# Patient Record
Sex: FEMALE | Race: WHITE | Employment: PART TIME | URBAN - METROPOLITAN AREA
[De-identification: names, ages, dates, MRNs, and addresses within clinical notes are randomized per-mention and may not be internally consistent; named-entity substitution may affect disease eponyms.]

---

## 2017-06-23 ENCOUNTER — TELEPHONE (OUTPATIENT)
Dept: CARDIOLOGY CLINIC | Age: 52
End: 2017-06-23

## 2017-06-24 RX ORDER — METOPROLOL TARTRATE 100 MG/1
100 TABLET ORAL 2 TIMES DAILY
Qty: 60 TABLET | Refills: 0 | Status: ON HOLD | OUTPATIENT
Start: 2017-06-24 | End: 2017-07-03 | Stop reason: HOSPADM

## 2017-07-10 ENCOUNTER — OFFICE VISIT (OUTPATIENT)
Dept: CARDIOLOGY CLINIC | Age: 52
End: 2017-07-10

## 2017-07-10 VITALS
BODY MASS INDEX: 48.82 KG/M2 | HEIGHT: 65 IN | SYSTOLIC BLOOD PRESSURE: 162 MMHG | HEART RATE: 64 BPM | WEIGHT: 293 LBS | DIASTOLIC BLOOD PRESSURE: 90 MMHG

## 2017-07-10 DIAGNOSIS — E66.09 NON MORBID OBESITY DUE TO EXCESS CALORIES: ICD-10-CM

## 2017-07-10 DIAGNOSIS — E88.81 METABOLIC SYNDROME: ICD-10-CM

## 2017-07-10 DIAGNOSIS — E78.2 MIXED HYPERLIPIDEMIA: ICD-10-CM

## 2017-07-10 DIAGNOSIS — I48.0 PAROXYSMAL ATRIAL FIBRILLATION (HCC): ICD-10-CM

## 2017-07-10 DIAGNOSIS — I87.2 VENOUS STASIS DERMATITIS OF BOTH LOWER EXTREMITIES: ICD-10-CM

## 2017-07-10 DIAGNOSIS — I10 ESSENTIAL HYPERTENSION: Primary | ICD-10-CM

## 2017-07-10 DIAGNOSIS — I48.91 ATRIAL FIBRILLATION WITH RVR (HCC): ICD-10-CM

## 2017-07-10 PROCEDURE — 99213 OFFICE O/P EST LOW 20 MIN: CPT | Performed by: INTERNAL MEDICINE

## 2017-07-10 PROCEDURE — 93000 ELECTROCARDIOGRAM COMPLETE: CPT | Performed by: INTERNAL MEDICINE

## 2017-07-10 RX ORDER — LISINOPRIL 10 MG/1
10 TABLET ORAL DAILY
Qty: 30 TABLET | Refills: 5 | Status: SHIPPED | OUTPATIENT
Start: 2017-07-10 | End: 2017-10-10 | Stop reason: SINTOL

## 2017-07-10 RX ORDER — PROPAFENONE HYDROCHLORIDE 150 MG/1
225 TABLET, FILM COATED ORAL EVERY 8 HOURS SCHEDULED
Qty: 90 TABLET | Refills: 5 | Status: SHIPPED | OUTPATIENT
Start: 2017-07-10 | End: 2017-07-25 | Stop reason: SDUPTHER

## 2017-07-10 RX ORDER — FENOFIBRATE 145 MG/1
145 TABLET, COATED ORAL DAILY
Qty: 30 TABLET | Refills: 3 | Status: SHIPPED | OUTPATIENT
Start: 2017-07-10 | End: 2017-10-22 | Stop reason: SDUPTHER

## 2017-07-14 ENCOUNTER — OFFICE VISIT (OUTPATIENT)
Dept: CARDIOLOGY CLINIC | Age: 52
End: 2017-07-14

## 2017-07-14 DIAGNOSIS — I48.91 ATRIAL FIBRILLATION WITH RVR (HCC): Primary | ICD-10-CM

## 2017-07-14 PROCEDURE — 99999 PR OFFICE/OUTPT VISIT,PROCEDURE ONLY: CPT | Performed by: INTERNAL MEDICINE

## 2017-07-17 LAB
BUN / CREAT RATIO: 19 (CALC) (ref 7–25)
BUN BLDV-MCNC: 19 MG/DL (ref 3–29)
CALCIUM SERPL-MCNC: 9.5 MG/DL (ref 8.5–10.5)
CHLORIDE BLD-SCNC: 101 MEQ/L (ref 96–110)
CO2: 29 MEQ/L (ref 19–32)
COPY(IES) SENT TO:: NORMAL
CREAT SERPL-MCNC: 1 MG/DL
GFR SERPL CREATININE-BSD FRML MDRD: 64 ML/MIN/1.73M2
GLUCOSE BLD-MCNC: 101 MG/DL
POTASSIUM SERPL-SCNC: 4.5 MEQ/L (ref 3.4–5.3)
SODIUM BLD-SCNC: 141 MEQ/L (ref 135–148)

## 2017-07-24 ENCOUNTER — TELEPHONE (OUTPATIENT)
Dept: CARDIOLOGY CLINIC | Age: 52
End: 2017-07-24

## 2017-07-25 RX ORDER — PROPAFENONE HYDROCHLORIDE 150 MG/1
225 TABLET, FILM COATED ORAL EVERY 8 HOURS SCHEDULED
Qty: 135 TABLET | Refills: 5 | Status: SHIPPED | OUTPATIENT
Start: 2017-07-25 | End: 2018-02-23 | Stop reason: SDUPTHER

## 2017-08-04 LAB
CHOLESTEROL, TOTAL: 173 MG/DL
COPY(IES) SENT TO:: NORMAL
GDT REPLACEMENT: NORMAL
HDLC SERPL-MCNC: 41 MG/DL
LDL CHOLESTEROL: 104 MG/DL (CALC)
TRIGL SERPL-MCNC: 138 MG/DL
VLDLC SERPL CALC-MCNC: 28 MG/DL (CALC) (ref 4–38)

## 2017-08-10 ENCOUNTER — NURSE ONLY (OUTPATIENT)
Dept: CARDIOLOGY CLINIC | Age: 52
End: 2017-08-10

## 2017-08-10 ENCOUNTER — OFFICE VISIT (OUTPATIENT)
Dept: CARDIOLOGY CLINIC | Age: 52
End: 2017-08-10

## 2017-08-10 VITALS
DIASTOLIC BLOOD PRESSURE: 90 MMHG | HEART RATE: 61 BPM | SYSTOLIC BLOOD PRESSURE: 136 MMHG | HEIGHT: 65 IN | WEIGHT: 293 LBS | BODY MASS INDEX: 48.82 KG/M2

## 2017-08-10 DIAGNOSIS — I10 ESSENTIAL HYPERTENSION: Primary | ICD-10-CM

## 2017-08-10 DIAGNOSIS — I10 ESSENTIAL HYPERTENSION: ICD-10-CM

## 2017-08-10 DIAGNOSIS — E88.81 METABOLIC SYNDROME: ICD-10-CM

## 2017-08-10 DIAGNOSIS — E66.09 NON MORBID OBESITY DUE TO EXCESS CALORIES: ICD-10-CM

## 2017-08-10 DIAGNOSIS — I87.2 VENOUS STASIS DERMATITIS OF BOTH LOWER EXTREMITIES: ICD-10-CM

## 2017-08-10 DIAGNOSIS — I48.0 PAROXYSMAL ATRIAL FIBRILLATION (HCC): ICD-10-CM

## 2017-08-10 DIAGNOSIS — E78.2 MIXED HYPERLIPIDEMIA: ICD-10-CM

## 2017-08-10 DIAGNOSIS — R00.2 PALPITATIONS: Primary | ICD-10-CM

## 2017-08-10 PROCEDURE — 99213 OFFICE O/P EST LOW 20 MIN: CPT | Performed by: INTERNAL MEDICINE

## 2017-08-10 PROCEDURE — 93000 ELECTROCARDIOGRAM COMPLETE: CPT | Performed by: INTERNAL MEDICINE

## 2017-08-10 RX ORDER — PROPAFENONE HYDROCHLORIDE 150 MG/1
225 TABLET, FILM COATED ORAL EVERY 8 HOURS SCHEDULED
Qty: 135 TABLET | Refills: 5 | Status: CANCELLED | OUTPATIENT
Start: 2017-08-10

## 2017-08-16 ENCOUNTER — INITIAL CONSULT (OUTPATIENT)
Dept: CARDIOLOGY CLINIC | Age: 52
End: 2017-08-16

## 2017-08-16 VITALS
BODY MASS INDEX: 48.82 KG/M2 | WEIGHT: 293 LBS | HEART RATE: 55 BPM | SYSTOLIC BLOOD PRESSURE: 164 MMHG | HEIGHT: 65 IN | DIASTOLIC BLOOD PRESSURE: 90 MMHG | OXYGEN SATURATION: 99 %

## 2017-08-16 DIAGNOSIS — G47.30 SLEEP APNEA, UNSPECIFIED TYPE: ICD-10-CM

## 2017-08-16 DIAGNOSIS — I48.0 PAROXYSMAL ATRIAL FIBRILLATION (HCC): ICD-10-CM

## 2017-08-16 DIAGNOSIS — I10 ESSENTIAL HYPERTENSION: ICD-10-CM

## 2017-08-16 DIAGNOSIS — E66.09 NON MORBID OBESITY DUE TO EXCESS CALORIES: ICD-10-CM

## 2017-08-16 DIAGNOSIS — I87.2 VENOUS STASIS DERMATITIS OF BOTH LOWER EXTREMITIES: ICD-10-CM

## 2017-08-16 DIAGNOSIS — I48.91 ATRIAL FIBRILLATION WITH RVR (HCC): ICD-10-CM

## 2017-08-16 DIAGNOSIS — E88.81 METABOLIC SYNDROME: ICD-10-CM

## 2017-08-16 DIAGNOSIS — I48.0 PAF (PAROXYSMAL ATRIAL FIBRILLATION) (HCC): Primary | ICD-10-CM

## 2017-08-16 DIAGNOSIS — E78.2 MIXED HYPERLIPIDEMIA: ICD-10-CM

## 2017-08-16 PROCEDURE — 93000 ELECTROCARDIOGRAM COMPLETE: CPT | Performed by: INTERNAL MEDICINE

## 2017-08-16 PROCEDURE — 99205 OFFICE O/P NEW HI 60 MIN: CPT | Performed by: INTERNAL MEDICINE

## 2017-08-22 PROCEDURE — 93228 REMOTE 30 DAY ECG REV/REPORT: CPT | Performed by: INTERNAL MEDICINE

## 2017-08-27 ASSESSMENT — ENCOUNTER SYMPTOMS
WHEEZING: 0
SHORTNESS OF BREATH: 1
ABDOMINAL PAIN: 0
COLOR CHANGE: 0
PHOTOPHOBIA: 0
CHEST TIGHTNESS: 0
NAUSEA: 0
BACK PAIN: 0
EYE PAIN: 0
CONSTIPATION: 0
VOMITING: 0
COUGH: 0
DIARRHEA: 0
BLOOD IN STOOL: 0

## 2017-09-18 ENCOUNTER — TELEPHONE (OUTPATIENT)
Dept: CARDIOLOGY CLINIC | Age: 52
End: 2017-09-18

## 2017-09-20 ENCOUNTER — HOSPITAL ENCOUNTER (OUTPATIENT)
Dept: SLEEP CENTER | Age: 52
Discharge: HOME | End: 2017-09-20
Attending: INTERNAL MEDICINE | Admitting: INTERNAL MEDICINE

## 2017-09-20 VITALS
HEIGHT: 64 IN | SYSTOLIC BLOOD PRESSURE: 155 MMHG | DIASTOLIC BLOOD PRESSURE: 71 MMHG | HEART RATE: 57 BPM | BODY MASS INDEX: 50.02 KG/M2 | WEIGHT: 293 LBS

## 2017-09-20 DIAGNOSIS — G47.33 OBSTRUCTIVE SLEEP APNEA (ADULT) (PEDIATRIC): Primary | ICD-10-CM

## 2017-09-20 ASSESSMENT — SLEEP AND FATIGUE QUESTIONNAIRES
HOW LIKELY ARE YOU TO NOD OFF OR FALL ASLEEP WHEN YOU ARE A PASSENGER IN A CAR FOR AN HOUR WITHOUT A BREAK: 1
HOW LIKELY ARE YOU TO NOD OFF OR FALL ASLEEP WHILE SITTING INACTIVE IN A PUBLIC PLACE: 0
HOW LIKELY ARE YOU TO NOD OFF OR FALL ASLEEP IN A CAR, WHILE STOPPED FOR A FEW MINUTES IN TRAFFIC: 0
HOW LIKELY ARE YOU TO NOD OFF OR FALL ASLEEP WHILE SITTING AND READING: 0
HOW LIKELY ARE YOU TO NOD OFF OR FALL ASLEEP WHILE SITTING AND TALKING TO SOMEONE: 0
NECK CIRCUMFERENCE (INCHES): 19.75
HOW LIKELY ARE YOU TO NOD OFF OR FALL ASLEEP WHILE WATCHING TV: 2
HOW LIKELY ARE YOU TO NOD OFF OR FALL ASLEEP WHILE LYING DOWN TO REST IN THE AFTERNOON WHEN CIRCUMSTANCES PERMIT: 1
HOW LIKELY ARE YOU TO NOD OFF OR FALL ASLEEP WHILE SITTING QUIETLY AFTER LUNCH WITHOUT ALCOHOL: 0
ESS TOTAL SCORE: 4

## 2017-10-05 ENCOUNTER — HOSPITAL ENCOUNTER (OUTPATIENT)
Dept: SLEEP CENTER | Age: 52
Discharge: HOME | End: 2017-10-05
Attending: PSYCHIATRY & NEUROLOGY | Admitting: PSYCHIATRY & NEUROLOGY

## 2017-10-05 VITALS — BODY MASS INDEX: 50.02 KG/M2 | WEIGHT: 293 LBS | HEIGHT: 64 IN

## 2017-10-05 DIAGNOSIS — G47.33 OBSTRUCTIVE SLEEP APNEA (ADULT) (PEDIATRIC): ICD-10-CM

## 2017-10-09 ENCOUNTER — TELEPHONE (OUTPATIENT)
Dept: CARDIOLOGY CLINIC | Age: 52
End: 2017-10-09

## 2017-10-09 NOTE — TELEPHONE ENCOUNTER
Pt notified that Ronaldo Livonia will talk to Dr. Holloway Proud about this when he is here tomorrow afternoon and see what he wants to change her to.

## 2017-10-09 NOTE — TELEPHONE ENCOUNTER
Pt has been on Lisinopril since the beginning of July. About 3-4 weeks ago pt states she started having a dry, hacking cough with no other symptoms. Pt states she thinks it is the medication causing this and wants to know what to do.

## 2017-10-10 NOTE — TELEPHONE ENCOUNTER
Per Dr. Daigle Outlaw patient to DC Lisinopril and start Cozaar 25 mg daily.   Med e-scribed to [facility]

## 2017-10-11 RX ORDER — LOSARTAN POTASSIUM 25 MG/1
25 TABLET ORAL DAILY
Qty: 30 TABLET | Refills: 6 | Status: SHIPPED | OUTPATIENT
Start: 2017-10-11 | End: 2017-10-25 | Stop reason: SINTOL

## 2017-10-11 NOTE — PROGRESS NOTES
Patient Name: Wes Flores   :  1965  MRN:  [id number]    Admitting Physician:  Roberto Morris MD    Sleep study results finalized please see media tab.     Electronically signed by Philippe Monroy on 10/11/2017 at 2:22 PM

## 2017-10-23 RX ORDER — FENOFIBRATE 145 MG/1
145 TABLET, COATED ORAL DAILY
Qty: 90 TABLET | Refills: 3 | Status: SHIPPED | OUTPATIENT
Start: 2017-10-23 | End: 2018-10-26 | Stop reason: SDUPTHER

## 2017-10-25 ENCOUNTER — TELEPHONE (OUTPATIENT)
Dept: CARDIOLOGY CLINIC | Age: 52
End: 2017-10-25

## 2017-10-27 ENCOUNTER — OFFICE VISIT (OUTPATIENT)
Dept: CARDIOLOGY CLINIC | Age: 52
End: 2017-10-27

## 2017-10-27 VITALS
SYSTOLIC BLOOD PRESSURE: 138 MMHG | BODY MASS INDEX: 48.12 KG/M2 | DIASTOLIC BLOOD PRESSURE: 88 MMHG | HEART RATE: 60 BPM | WEIGHT: 288.8 LBS | HEIGHT: 65 IN

## 2017-10-27 DIAGNOSIS — I48.0 PAF (PAROXYSMAL ATRIAL FIBRILLATION) (HCC): Primary | ICD-10-CM

## 2017-10-27 PROCEDURE — 99213 OFFICE O/P EST LOW 20 MIN: CPT | Performed by: INTERNAL MEDICINE

## 2017-10-27 PROCEDURE — 93000 ELECTROCARDIOGRAM COMPLETE: CPT | Performed by: INTERNAL MEDICINE

## 2017-10-27 NOTE — PROGRESS NOTES
Negative for activity change, chills and fever. HENT: Negative for congestion, ear pain and tinnitus. Eyes: Negative for photophobia, pain and visual disturbance. Respiratory:  Negative for cough, chest tightness and wheezing. Cardiovascular: Positive for palpitations. Negative for chest pain and leg swelling. Gastrointestinal: Negative for abdominal pain, blood in stool, constipation, diarrhea, nausea and vomiting. Endocrine: Negative for cold intolerance and heat intolerance. Genitourinary: Negative for dysuria, flank pain and hematuria. Musculoskeletal: Negative for arthralgias, back pain, myalgias and neck stiffness. Skin: Negative for color change and rash. Allergic/Immunologic: Negative for food allergies. Neurological: Negative for dizziness, light-headedness, numbness and headaches. Hematological: Does not bruise/bleed easily. Psychiatric/Behavioral: Negative for agitation, behavioral problems and confusion. Physical Examination:    /88   Pulse 60   Ht 5' 5\" (1.651 m)   Wt 288 lb 12.8 oz (131 kg)   BMI 48.06 kg/m²    Wt Readings from Last 3 Encounters:   11/09/17 293 lb (132.9 kg)   10/27/17 288 lb 12.8 oz (131 kg)   10/05/17 293 lb (132.9 kg)     Body mass index is 48.06 kg/m². Physical Exam   Constitutional: She is oriented to person, place, and time and well-developed, well-nourished, and in no distress. HENT:   Head: Normocephalic and atraumatic. Eyes: Conjunctivae and EOM are normal. Pupils are equal, round, and reactive to light. Right eye exhibits no discharge. Neck: Normal range of motion. No JVD present. No thyromegaly present. Cardiovascular: Normal rate, regular rhythm and normal heart sounds. Exam reveals no friction rub. No murmur heard. Pulmonary/Chest: Effort normal and breath sounds normal. No stridor. No respiratory distress. She has no wheezes. Abdominal: Soft. Bowel sounds are normal. She exhibits no distension.  There is

## 2017-10-27 NOTE — PATIENT INSTRUCTIONS
Please remember to bring all medication bottles or a medication list with you to your appointment. If you have any questions, please call our office at [phone].

## 2017-10-30 ENCOUNTER — TELEPHONE (OUTPATIENT)
Dept: CARDIOLOGY CLINIC | Age: 52
End: 2017-10-30

## 2017-11-09 ENCOUNTER — HOSPITAL ENCOUNTER (OUTPATIENT)
Dept: SLEEP CENTER | Age: 52
Discharge: HOME | End: 2017-11-09
Attending: PSYCHIATRY & NEUROLOGY | Admitting: PSYCHIATRY & NEUROLOGY

## 2017-11-09 VITALS — BODY MASS INDEX: 50.02 KG/M2 | WEIGHT: 293 LBS | HEIGHT: 64 IN

## 2017-11-09 ASSESSMENT — SLEEP AND FATIGUE QUESTIONNAIRES
HOW LIKELY ARE YOU TO NOD OFF OR FALL ASLEEP IN A CAR, WHILE STOPPED FOR A FEW MINUTES IN TRAFFIC: 0
HOW LIKELY ARE YOU TO NOD OFF OR FALL ASLEEP WHILE SITTING INACTIVE IN A PUBLIC PLACE: 0
HOW LIKELY ARE YOU TO NOD OFF OR FALL ASLEEP WHILE SITTING QUIETLY AFTER LUNCH WITHOUT ALCOHOL: 0
HOW LIKELY ARE YOU TO NOD OFF OR FALL ASLEEP WHILE SITTING AND READING: 0
ESS TOTAL SCORE: 4
HOW LIKELY ARE YOU TO NOD OFF OR FALL ASLEEP WHILE WATCHING TV: 2
HOW LIKELY ARE YOU TO NOD OFF OR FALL ASLEEP WHILE LYING DOWN TO REST IN THE AFTERNOON WHEN CIRCUMSTANCES PERMIT: 1
HOW LIKELY ARE YOU TO NOD OFF OR FALL ASLEEP WHEN YOU ARE A PASSENGER IN A CAR FOR AN HOUR WITHOUT A BREAK: 1
HOW LIKELY ARE YOU TO NOD OFF OR FALL ASLEEP WHILE SITTING AND TALKING TO SOMEONE: 0
NECK CIRCUMFERENCE (INCHES): 19.75

## 2017-12-11 ENCOUNTER — TELEPHONE (OUTPATIENT)
Dept: FAMILY MEDICINE CLINIC | Age: 52
End: 2017-12-11

## 2017-12-11 NOTE — TELEPHONE ENCOUNTER
Patient called-she had bronchitis-she is requesting a antibiotic-to be sent to pharmacy-she states she will f/u later-but she's not feeling very good to come in to appt-she states she gets this every year and obi are familiar with it-please advise

## 2017-12-12 RX ORDER — METOPROLOL TARTRATE 50 MG/1
50 TABLET, FILM COATED ORAL 2 TIMES DAILY
Qty: 60 TABLET | Refills: 6 | Status: SHIPPED | OUTPATIENT
Start: 2017-12-12 | End: 2018-07-16 | Stop reason: SDUPTHER

## 2017-12-12 NOTE — TELEPHONE ENCOUNTER
Pt is supposed to take 50mg twice a day. When it was called in they called in 25mg twice a day. Pt is running out sooner than what her insurance will pay for. Please call in the correct prescription. She has enough to last a week.  If any questions please call pt.       30 day

## 2017-12-27 ENCOUNTER — OFFICE VISIT (OUTPATIENT)
Dept: CARDIOLOGY CLINIC | Age: 52
End: 2017-12-27

## 2017-12-27 ENCOUNTER — HOSPITAL ENCOUNTER (OUTPATIENT)
Dept: SLEEP CENTER | Age: 52
Discharge: HOME | End: 2017-12-27
Attending: PSYCHIATRY & NEUROLOGY | Admitting: PSYCHIATRY & NEUROLOGY

## 2017-12-27 VITALS
HEIGHT: 65 IN | DIASTOLIC BLOOD PRESSURE: 82 MMHG | HEART RATE: 58 BPM | BODY MASS INDEX: 47.65 KG/M2 | WEIGHT: 286 LBS | SYSTOLIC BLOOD PRESSURE: 132 MMHG

## 2017-12-27 VITALS — HEIGHT: 64 IN | WEIGHT: 293 LBS | BODY MASS INDEX: 50.02 KG/M2

## 2017-12-27 DIAGNOSIS — I48.91 ATRIAL FIBRILLATION WITH RVR (HCC): ICD-10-CM

## 2017-12-27 DIAGNOSIS — I48.0 PAROXYSMAL ATRIAL FIBRILLATION (HCC): Primary | ICD-10-CM

## 2017-12-27 DIAGNOSIS — E88.81 METABOLIC SYNDROME: ICD-10-CM

## 2017-12-27 DIAGNOSIS — I10 ESSENTIAL HYPERTENSION: ICD-10-CM

## 2017-12-27 DIAGNOSIS — E78.2 MIXED HYPERLIPIDEMIA: ICD-10-CM

## 2017-12-27 DIAGNOSIS — Z12.11 COLON CANCER SCREENING: ICD-10-CM

## 2017-12-27 DIAGNOSIS — E66.01 OBESITY, MORBID, BMI 40.0-49.9 (HCC): ICD-10-CM

## 2017-12-27 DIAGNOSIS — E66.09 NON MORBID OBESITY DUE TO EXCESS CALORIES: ICD-10-CM

## 2017-12-27 PROCEDURE — 99213 OFFICE O/P EST LOW 20 MIN: CPT | Performed by: INTERNAL MEDICINE

## 2017-12-27 NOTE — PATIENT INSTRUCTIONS
CAD:No  HTN:well controlled on current medical regimen, see list above. - changes in  treatment:   no   CARDIOMYOPATHY: None known   CONGESTIVE HEART FAILURE: NO KNOWN HISTORY.     VHD: No significant VHD noted  DYSLIPIDEMIA: Patient's profile is at / near Goal.yes,                                 HDL is low                                Tolerating current medical regimen wellyes,                                                               See most recent Lab values in Labs section above. OTHER RELEVANT DIAGNOSIS:as noted in patient's active problem list:MARLINE  TESTS ORDERED: None this visit                                              All previously ordered tests reviewed. ARRHYTHMIAS: Patient has H/O P. Atrial fibrillation                                She is rate controlled & on anticoagulation. Patient is in NSR with the help of anti arrhythmics. MEDICATIONS: CPM except she can stop baby ASA   Office f/u in six months. Primary/secondary prevention is the goal by aggressive risk modification, healthy and therapeutic life style changes for cardiovascular risk reduction. Various goals are discussed and multiple questions answered.

## 2018-02-21 ENCOUNTER — HOSPITAL ENCOUNTER (OUTPATIENT)
Dept: SLEEP CENTER | Age: 53
Discharge: HOME | End: 2018-02-21
Attending: PSYCHIATRY & NEUROLOGY | Admitting: PSYCHIATRY & NEUROLOGY

## 2018-02-21 NOTE — PROGRESS NOTES
Junie Gentile MD, Kraig Krishna MD, Amanda Vences MD, [facility]    [address], [address] [facility]: [phone]  F: [phone]     Subjective:     Patient ID: Hector Clements is a 45 y.o. female, referred to the sleep center for   Chief Complaint   Patient presents with    Sleep Apnea    1 Month Follow-Up   . Referring physician: Dr Sidney Presley     History:  MARLINE, PSG showed AHI18.8. On BIPAP  12/6                   Diff in tolerating BPAP; Ha and palpitation on waking up. Social History     Social History    Marital status:      Spouse name: N/A    Number of children: 2    Years of education: N/A     Occupational History    full time      Social History Main Topics    Smoking status: Former Smoker     Quit date: 6/26/2002    Smokeless tobacco: Never Used      Comment: 11/11/15    Alcohol use No    Drug use: No    Sexual activity: Yes     Partners: Male     Other Topics Concern    Not on file     Social History Narrative    No narrative on file       Prior to Admission medications    Medication Sig Start Date End Date Taking? Authorizing Provider   metoprolol tartrate (LOPRESSOR) 50 MG tablet Take 1 tablet by mouth 2 times daily 12/12/17  Yes Miky Leon MD   fenofibrate (TRICOR) 145 MG tablet Take 1 tablet by mouth daily 10/23/17  Yes Miky Leon MD   rivaroxaban (XARELTO) 20 MG TABS tablet Take 1 tablet by mouth daily 7/25/17  Yes James Rogers MD   propafenone (RYTHMOL) 150 MG tablet Take 1.5 tablets by mouth every 8 hours 7/25/17  Yes Faith Alves MD   Compression Stockings MISC by Does not apply route Moderate Compression level 4/25/16  Yes Reji Harrison MD   FISH OIL by Does not apply route 2 times daily. Yes Historical Provider, MD   aspirin 81 MG tablet Take 81 mg by mouth daily.       Historical Provider, MD       Allergies as of

## 2018-02-24 RX ORDER — PROPAFENONE HYDROCHLORIDE 150 MG/1
225 TABLET, FILM COATED ORAL EVERY 8 HOURS SCHEDULED
Qty: 405 TABLET | Refills: 3 | Status: SHIPPED | OUTPATIENT
Start: 2018-02-24 | End: 2018-10-26 | Stop reason: SDUPTHER

## 2018-02-26 RX ORDER — PROPAFENONE HYDROCHLORIDE 150 MG/1
150 TABLET, FILM COATED ORAL EVERY 8 HOURS
Qty: 90 TABLET | Refills: 3 | Status: SHIPPED | OUTPATIENT
Start: 2018-02-26 | End: 2018-02-28 | Stop reason: DRUGHIGH

## 2018-02-28 ENCOUNTER — TELEPHONE (OUTPATIENT)
Dept: CARDIOLOGY CLINIC | Age: 53
End: 2018-02-28

## 2018-02-28 DIAGNOSIS — R06.2 ASTHMATIC BREATHING: ICD-10-CM

## 2018-02-28 RX ORDER — PROPAFENONE HYDROCHLORIDE 150 MG/1
150 TABLET, FILM COATED ORAL EVERY 8 HOURS
Qty: 135 TABLET | Refills: 5 | Status: SHIPPED
Start: 2018-02-28 | End: 2018-04-06 | Stop reason: ALTCHOICE

## 2018-03-09 ENCOUNTER — HOSPITAL ENCOUNTER (OUTPATIENT)
Dept: SLEEP CENTER | Age: 53
Discharge: HOME | End: 2018-03-09
Attending: PSYCHIATRY & NEUROLOGY | Admitting: PSYCHIATRY & NEUROLOGY

## 2018-03-09 DIAGNOSIS — G47.33 OSA TREATED WITH BIPAP: ICD-10-CM

## 2018-03-23 ENCOUNTER — TELEPHONE (OUTPATIENT)
Dept: CARDIOLOGY CLINIC | Age: 53
End: 2018-03-23

## 2018-03-23 DIAGNOSIS — E66.09 NON MORBID OBESITY DUE TO EXCESS CALORIES: ICD-10-CM

## 2018-03-23 DIAGNOSIS — I48.91 ATRIAL FIBRILLATION WITH RVR (HCC): Primary | ICD-10-CM

## 2018-03-23 DIAGNOSIS — G47.33 OSA TREATED WITH BIPAP: ICD-10-CM

## 2018-04-03 ENCOUNTER — TELEPHONE (OUTPATIENT)
Dept: CARDIOLOGY CLINIC | Age: 53
End: 2018-04-03

## 2018-04-06 ENCOUNTER — OFFICE VISIT (OUTPATIENT)
Dept: FAMILY MEDICINE CLINIC | Age: 53
End: 2018-04-06

## 2018-04-06 VITALS
WEIGHT: 292.8 LBS | BODY MASS INDEX: 50.26 KG/M2 | DIASTOLIC BLOOD PRESSURE: 78 MMHG | RESPIRATION RATE: 20 BRPM | SYSTOLIC BLOOD PRESSURE: 138 MMHG | OXYGEN SATURATION: 97 % | HEART RATE: 54 BPM

## 2018-04-06 DIAGNOSIS — M54.12 CERVICAL RADICULOPATHY: ICD-10-CM

## 2018-04-06 DIAGNOSIS — M54.2 NECK PAIN ON LEFT SIDE: Primary | ICD-10-CM

## 2018-04-06 PROCEDURE — 99213 OFFICE O/P EST LOW 20 MIN: CPT | Performed by: FAMILY MEDICINE

## 2018-04-06 PROCEDURE — 96372 THER/PROPH/DIAG INJ SC/IM: CPT | Performed by: FAMILY MEDICINE

## 2018-04-06 RX ORDER — PREDNISONE 20 MG/1
20 TABLET ORAL DAILY
Qty: 10 TABLET | Refills: 0 | Status: SHIPPED | OUTPATIENT
Start: 2018-04-06 | End: 2018-04-16

## 2018-04-06 RX ORDER — BETAMETHASONE SODIUM PHOSPHATE AND BETAMETHASONE ACETATE 3; 3 MG/ML; MG/ML
12 INJECTION, SUSPENSION INTRA-ARTICULAR; INTRALESIONAL; INTRAMUSCULAR; SOFT TISSUE ONCE
Status: COMPLETED | OUTPATIENT
Start: 2018-04-06 | End: 2018-04-06

## 2018-04-06 RX ADMIN — BETAMETHASONE SODIUM PHOSPHATE AND BETAMETHASONE ACETATE 12 MG: 3; 3 INJECTION, SUSPENSION INTRA-ARTICULAR; INTRALESIONAL; INTRAMUSCULAR; SOFT TISSUE at 11:27

## 2018-04-06 ASSESSMENT — ENCOUNTER SYMPTOMS
SORE THROAT: 0
SINUS PRESSURE: 0
BACK PAIN: 0
EYES NEGATIVE: 1
CHEST TIGHTNESS: 0
VOMITING: 0
SHORTNESS OF BREATH: 0
NAUSEA: 0
CONSTIPATION: 0
BLOOD IN STOOL: 0
ALLERGIC/IMMUNOLOGIC NEGATIVE: 1
ABDOMINAL PAIN: 0
COUGH: 0
RHINORRHEA: 0
WHEEZING: 0
DIARRHEA: 0

## 2018-04-06 ASSESSMENT — PATIENT HEALTH QUESTIONNAIRE - PHQ9
1. LITTLE INTEREST OR PLEASURE IN DOING THINGS: 0
2. FEELING DOWN, DEPRESSED OR HOPELESS: 0
SUM OF ALL RESPONSES TO PHQ9 QUESTIONS 1 & 2: 0
SUM OF ALL RESPONSES TO PHQ QUESTIONS 1-9: 0

## 2018-04-09 ENCOUNTER — TELEPHONE (OUTPATIENT)
Dept: FAMILY MEDICINE CLINIC | Age: 53
End: 2018-04-09

## 2018-04-10 ENCOUNTER — HOSPITAL ENCOUNTER (OUTPATIENT)
Dept: GENERAL RADIOLOGY | Age: 53
Discharge: HOME | End: 2018-04-10
Attending: FAMILY MEDICINE | Admitting: FAMILY MEDICINE

## 2018-04-10 ENCOUNTER — OFFICE VISIT (OUTPATIENT)
Dept: FAMILY MEDICINE CLINIC | Age: 53
End: 2018-04-10

## 2018-04-10 VITALS
OXYGEN SATURATION: 98 % | HEART RATE: 60 BPM | DIASTOLIC BLOOD PRESSURE: 88 MMHG | RESPIRATION RATE: 22 BRPM | SYSTOLIC BLOOD PRESSURE: 158 MMHG | WEIGHT: 293 LBS | BODY MASS INDEX: 50.71 KG/M2

## 2018-04-10 DIAGNOSIS — I48.0 PAROXYSMAL ATRIAL FIBRILLATION (HCC): ICD-10-CM

## 2018-04-10 DIAGNOSIS — M54.2 CERVICALGIA: ICD-10-CM

## 2018-04-10 DIAGNOSIS — M54.2 NECK PAIN ON LEFT SIDE: ICD-10-CM

## 2018-04-10 DIAGNOSIS — R06.02 SHORTNESS OF BREATH: Primary | ICD-10-CM

## 2018-04-10 DIAGNOSIS — R06.02 SHORTNESS OF BREATH: ICD-10-CM

## 2018-04-10 DIAGNOSIS — R06.02 BREATH SHORTNESS: ICD-10-CM

## 2018-04-10 DIAGNOSIS — M54.12 CERVICAL RADICULOPATHY: ICD-10-CM

## 2018-04-10 DIAGNOSIS — J18.9 PNEUMONIA OF LEFT LOWER LOBE DUE TO INFECTIOUS ORGANISM: ICD-10-CM

## 2018-04-10 LAB
BASOPHILS ABSOLUTE: 0.1 K/UL (ref 0–0.2)
BASOPHILS RELATIVE PERCENT: 0.6 %
D DIMER: <200 NG/ML DDU (ref 0–229)
EOSINOPHILS ABSOLUTE: 0.1 K/UL (ref 0–0.6)
EOSINOPHILS RELATIVE PERCENT: 1 %
HCT VFR BLD CALC: 40.3 % (ref 36–48)
HEMOGLOBIN: 13.6 G/DL (ref 12–16)
LYMPHOCYTES ABSOLUTE: 2.2 K/UL (ref 1–5.1)
LYMPHOCYTES RELATIVE PERCENT: 26.4 %
MCH RBC QN AUTO: 30 PG (ref 26–34)
MCHC RBC AUTO-ENTMCNC: 33.8 G/DL (ref 31–36)
MCV RBC AUTO: 88.9 FL (ref 80–100)
MONOCYTES ABSOLUTE: 0.7 K/UL (ref 0–1.3)
MONOCYTES RELATIVE PERCENT: 8.9 %
NEUTROPHILS ABSOLUTE: 5.1 K/UL (ref 1.7–7.7)
NEUTROPHILS RELATIVE PERCENT: 63.1 %
PDW BLD-RTO: 14 % (ref 12.4–15.4)
PLATELET # BLD: 245 K/UL (ref 135–450)
PMV BLD AUTO: 8.7 FL (ref 5–10.5)
RBC # BLD: 4.53 M/UL (ref 4–5.2)
WBC # BLD: 8.1 K/UL (ref 4–11)

## 2018-04-10 PROCEDURE — 99214 OFFICE O/P EST MOD 30 MIN: CPT | Performed by: FAMILY MEDICINE

## 2018-04-10 PROCEDURE — 93000 ELECTROCARDIOGRAM COMPLETE: CPT | Performed by: FAMILY MEDICINE

## 2018-04-10 RX ORDER — AZITHROMYCIN 250 MG/1
TABLET, FILM COATED ORAL
Qty: 1 PACKET | Refills: 0 | Status: SHIPPED | OUTPATIENT
Start: 2018-04-10 | End: 2019-02-22 | Stop reason: SDUPTHER

## 2018-04-10 ASSESSMENT — ENCOUNTER SYMPTOMS
DIARRHEA: 0
CONSTIPATION: 0
ALLERGIC/IMMUNOLOGIC NEGATIVE: 1
NAUSEA: 0
SHORTNESS OF BREATH: 1
CHEST TIGHTNESS: 0
EYES NEGATIVE: 1
BACK PAIN: 0
RHINORRHEA: 0
ABDOMINAL PAIN: 0
BLOOD IN STOOL: 0
VOMITING: 0
SINUS PRESSURE: 0
COUGH: 0
WHEEZING: 0
SORE THROAT: 0

## 2018-04-11 LAB
A/G RATIO: 2.3 (ref 1.1–2.2)
ALBUMIN SERPL-MCNC: 4.3 G/DL (ref 3.4–5)
ALP BLD-CCNC: 34 U/L (ref 40–129)
ALT SERPL-CCNC: 62 U/L (ref 10–40)
ANION GAP SERPL CALCULATED.3IONS-SCNC: 12 MMOL/L (ref 3–16)
AST SERPL-CCNC: 38 U/L (ref 15–37)
BILIRUB SERPL-MCNC: 0.4 MG/DL (ref 0–1)
BUN BLDV-MCNC: 24 MG/DL (ref 7–20)
C-REACTIVE PROTEIN: 3.7 MG/L (ref 0–5.1)
CALCIUM SERPL-MCNC: 8.9 MG/DL (ref 8.3–10.6)
CHLORIDE BLD-SCNC: 95 MMOL/L (ref 99–110)
CO2: 29 MMOL/L (ref 21–32)
CREAT SERPL-MCNC: 0.7 MG/DL (ref 0.6–1.1)
GFR AFRICAN AMERICAN: >60
GFR NON-AFRICAN AMERICAN: >60
GLOBULIN: 1.9 G/DL
GLUCOSE BLD-MCNC: 94 MG/DL (ref 70–99)
POTASSIUM SERPL-SCNC: 4.1 MMOL/L (ref 3.5–5.1)
SODIUM BLD-SCNC: 136 MMOL/L (ref 136–145)
TOTAL PROTEIN: 6.2 G/DL (ref 6.4–8.2)

## 2018-04-13 DIAGNOSIS — M54.2 NECK PAIN ON LEFT SIDE: Primary | ICD-10-CM

## 2018-04-30 ENCOUNTER — TELEPHONE (OUTPATIENT)
Dept: CARDIOLOGY CLINIC | Age: 53
End: 2018-04-30

## 2018-05-04 ENCOUNTER — TELEPHONE (OUTPATIENT)
Dept: FAMILY MEDICINE CLINIC | Age: 53
End: 2018-05-04

## 2018-05-09 ENCOUNTER — OFFICE VISIT (OUTPATIENT)
Dept: FAMILY MEDICINE CLINIC | Age: 53
End: 2018-05-09

## 2018-05-09 VITALS
BODY MASS INDEX: 49.98 KG/M2 | WEIGHT: 291.2 LBS | HEART RATE: 90 BPM | SYSTOLIC BLOOD PRESSURE: 134 MMHG | OXYGEN SATURATION: 94 % | RESPIRATION RATE: 16 BRPM | DIASTOLIC BLOOD PRESSURE: 86 MMHG

## 2018-05-09 DIAGNOSIS — M77.12 LEFT LATERAL EPICONDYLITIS: ICD-10-CM

## 2018-05-09 DIAGNOSIS — M54.2 NECK PAIN ON LEFT SIDE: Primary | ICD-10-CM

## 2018-05-09 DIAGNOSIS — M54.12 CERVICAL RADICULOPATHY: ICD-10-CM

## 2018-05-09 DIAGNOSIS — E66.01 OBESITY, MORBID, BMI 40.0-49.9 (HCC): ICD-10-CM

## 2018-05-09 PROBLEM — J18.9 PNEUMONIA OF LEFT LOWER LOBE DUE TO INFECTIOUS ORGANISM: Status: RESOLVED | Noted: 2018-04-10 | Resolved: 2018-05-09

## 2018-05-09 PROCEDURE — 99214 OFFICE O/P EST MOD 30 MIN: CPT | Performed by: FAMILY MEDICINE

## 2018-05-09 RX ORDER — PREGABALIN 50 MG/1
50 CAPSULE ORAL 2 TIMES DAILY
Qty: 60 CAPSULE | Refills: 2 | Status: SHIPPED | OUTPATIENT
Start: 2018-05-09 | End: 2018-07-23 | Stop reason: ALTCHOICE

## 2018-05-18 ENCOUNTER — HOSPITAL ENCOUNTER (OUTPATIENT)
Dept: MRI IMAGING | Age: 53
Discharge: HOME | End: 2018-05-18
Attending: FAMILY MEDICINE | Admitting: FAMILY MEDICINE

## 2018-05-18 DIAGNOSIS — M54.2 NECK PAIN ON LEFT SIDE: ICD-10-CM

## 2018-05-18 DIAGNOSIS — M54.12 CERVICAL RADICULOPATHY: ICD-10-CM

## 2018-05-18 DIAGNOSIS — M54.2 CERVICALGIA: ICD-10-CM

## 2018-05-18 RX ORDER — SODIUM CHLORIDE 0.9 % (FLUSH) 0.9 %
10 SYRINGE (ML) INJECTION PRN
Status: COMPLETED | OUTPATIENT
Start: 2018-05-18 | End: 2018-05-18

## 2018-05-18 RX ADMIN — Medication 10 ML: at 14:40

## 2018-05-18 ASSESSMENT — ENCOUNTER SYMPTOMS
HEARTBURN: 0
SHORTNESS OF BREATH: 0
GASTROINTESTINAL NEGATIVE: 1
EYES NEGATIVE: 1
RESPIRATORY NEGATIVE: 1
DIARRHEA: 0
COUGH: 0
CONSTIPATION: 0
BACK PAIN: 1

## 2018-05-21 DIAGNOSIS — M48.00 SPINAL STENOSIS, UNSPECIFIED SPINAL REGION: Primary | ICD-10-CM

## 2018-05-22 DIAGNOSIS — M48.00 SPINAL STENOSIS, UNSPECIFIED SPINAL REGION: Primary | ICD-10-CM

## 2018-06-13 ENCOUNTER — TELEPHONE (OUTPATIENT)
Dept: FAMILY MEDICINE CLINIC | Age: 53
End: 2018-06-13

## 2018-06-20 ENCOUNTER — HOSPITAL ENCOUNTER (OUTPATIENT)
Dept: WOMENS IMAGING | Age: 53
Discharge: HOME | End: 2018-06-20
Attending: FAMILY MEDICINE | Admitting: FAMILY MEDICINE

## 2018-06-20 DIAGNOSIS — Z12.31 VISIT FOR SCREENING MAMMOGRAM: ICD-10-CM

## 2018-07-09 DIAGNOSIS — K63.5 POLYP OF COLON, UNSPECIFIED PART OF COLON, UNSPECIFIED TYPE: Primary | ICD-10-CM

## 2018-07-16 RX ORDER — METOPROLOL TARTRATE 50 MG/1
50 TABLET, FILM COATED ORAL 2 TIMES DAILY
Qty: 180 TABLET | Refills: 1 | Status: SHIPPED | OUTPATIENT
Start: 2018-07-16 | End: 2019-01-11 | Stop reason: SDUPTHER

## 2018-07-23 ENCOUNTER — TELEPHONE (OUTPATIENT)
Dept: GASTROENTEROLOGY | Age: 53
End: 2018-07-23

## 2018-07-23 ENCOUNTER — OFFICE VISIT (OUTPATIENT)
Dept: GASTROENTEROLOGY | Age: 53
End: 2018-07-23

## 2018-07-23 VITALS
SYSTOLIC BLOOD PRESSURE: 138 MMHG | DIASTOLIC BLOOD PRESSURE: 90 MMHG | WEIGHT: 293 LBS | OXYGEN SATURATION: 94 % | BODY MASS INDEX: 48.82 KG/M2 | HEIGHT: 65 IN | HEART RATE: 60 BPM

## 2018-07-23 DIAGNOSIS — Z86.010 HISTORY OF COLON POLYPS: Primary | ICD-10-CM

## 2018-07-23 DIAGNOSIS — D68.9 COAGULOPATHY (HCC): ICD-10-CM

## 2018-07-23 PROCEDURE — 99215 OFFICE O/P EST HI 40 MIN: CPT | Performed by: NURSE PRACTITIONER

## 2018-07-23 RX ORDER — POLYETHYLENE GLYCOL 3350 17 G/17G
238 POWDER, FOR SOLUTION ORAL ONCE
Qty: 1 BOTTLE | Refills: 0 | Status: SHIPPED | OUTPATIENT
Start: 2018-07-23 | End: 2018-07-23

## 2018-07-23 ASSESSMENT — ENCOUNTER SYMPTOMS
BACK PAIN: 0
ABDOMINAL PAIN: 0
BLOOD IN STOOL: 0
DIARRHEA: 0
BLURRED VISION: 0
EYE PAIN: 0
WHEEZING: 0
VOMITING: 0
COUGH: 0
HEARTBURN: 1
NAUSEA: 0
SHORTNESS OF BREATH: 1
CONSTIPATION: 0
SPUTUM PRODUCTION: 0

## 2018-07-23 NOTE — PATIENT INSTRUCTIONS
medicines. These can increase your chances of quitting for good. · If you drink alcohol, limit how much you drink. Limit alcohol to 2 drinks a day for men and 1 drink a day for women. When should you call for help? Call your doctor now or seek immediate medical care if:    · You have severe belly pain.     · Your stools are maroon or very bloody.    Watch closely for changes in your health, and be sure to contact your doctor if:    · You have a fever.     · You have nausea or vomiting.     · You have a change in bowel habits (new constipation or diarrhea).     · Your symptoms get worse or are not improving as expected. Where can you learn more? Go to https://payByMobile.[employer]OVE Solutions. org and sign in to your Solid Sound account. Enter 95 [id number] in the [employer] box to learn more about \"Colon Polyps: Care Instructions. \"     If you do not have an account, please click on the \"Sign Up Now\" link. Current as of: May 12, 2017  Content Version: 11.6  © 7652-3817 Code[employer]. Care instructions adapted under license by [facility] ([facility]). If you have questions about a medical condition or this instruction, always ask your healthcare professional. [place] [address] any warranty or liability for your use of this information. Patient Education        Learning About Diverticulosis and Diverticulitis  What are diverticulosis and diverticulitis? In diverticulosis and diverticulitis, pouches called diverticula form in the wall of the large intestine, or colon. · In diverticulosis, the pouches do not cause any pain or other symptoms. · In diverticulitis, the pouches get inflamed or infected and cause symptoms. Doctors aren't sure what causes these pouches in the colon. But they think that a low-fiber diet may play a role. Without fiber to add bulk to the stool, the colon has to work harder than normal to push the stool forward.  The pressure from this may cause pouches to form in your diet. You may need only liquids at first. Your doctor may suggest pain medicines for pain or belly cramps. In some cases, surgery may be needed. Follow-up care is a key part of your treatment and safety. Be sure to make and go to all appointments, and call your doctor if you are having problems. It's also a good idea to know your test results and keep a list of the medicines you take. Where can you learn more? Go to https://Collegebound AirlinespeRevoLaze.idemama. org and sign in to your [employer] account. Enter T175 in the [facility] box to learn more about \"Learning About Diverticulosis and Diverticulitis. \"     If you do not have an account, please click on the \"Sign Up Now\" link. Current as of: May 12, 2017  Content Version: 11.6  © 2438-4301 [employer], [employer]. Care instructions adapted under license by [facility] ([facility]). If you have questions about a medical condition or this instruction, always ask your healthcare professional. [place] [address] any warranty or liability for your use of this information. Patient Education        High-Fiber Diet: Care Instructions  Your Care Instructions    A high-fiber diet may help you relieve constipation and feel less bloated. Your doctor and dietitian will help you make a high-fiber eating plan based on your personal needs. The plan will include the things you like to eat. It will also make sure that you get 30 grams of fiber a day. Before you make changes to the way you eat, be sure to talk with your doctor or dietitian. Follow-up care is a key part of your treatment and safety. Be sure to make and go to all appointments, and call your doctor if you are having problems. It's also a good idea to know your test results and keep a list of the medicines you take. How can you care for yourself at home? · You can increase how much fiber you get if you eat more of certain foods.  These foods include:  ¨ Whole-grain breads and other problems. This is called a biopsy. The doctor can also take out polyps. Before the test, you will need to stop eating solid foods. You also will drink a liquid or take a tablet that cleans out your colon. This helps your doctor be able to see inside your colon during the test.  Follow-up care is a key part of your treatment and safety. Be sure to make and go to all appointments, and call your doctor if you are having problems. It's also a good idea to know your test results and keep a list of the medicines you take. What happens before the procedure?   Preparing for the procedure    · Understand exactly what procedure is planned, along with the risks, benefits, and other options. · Tell your doctors ALL the medicines, vitamins, supplements, and herbal remedies you take. Some of these can increase the risk of bleeding or interact with anesthesia.     · If you take blood thinners, such as warfarin (Coumadin), clopidogrel (Plavix), or aspirin, be sure to talk to your doctor. He or she will tell you if you should stop taking these medicines before your procedure. Make sure that you understand exactly what your doctor wants you to do.     · Your doctor will tell you which medicines to take or stop before your procedure. You may need to stop taking certain medicines a week or more before the procedure. So talk to your doctor as soon as you can.     · If you have an advance directive, let your doctor know. It may include a living will and a durable power of  for health care. Bring a copy to the hospital. If you don't have one, you may want to prepare one. It lets your doctor and loved ones know your health care wishes. Doctors advise that everyone prepare these papers before any type of surgery or procedure.    Before the procedure    · Follow your doctor's directions about when to stop eating solid foods and drink only clear liquids.  You can drink water, clear juices, clear broths, flavored ice pops,

## 2018-07-23 NOTE — PROGRESS NOTES
Sarah Pa 45 y.o. female was seen by RUBEN Burgess on 07/23/18     Wt Readings from Last 3 Encounters:   07/23/18 300 lb (136.1 kg)   05/09/18 291 lb 3.2 oz (132.1 kg)   04/10/18 295 lb 6.4 oz (134 kg)       HPI    45year old pleasant  female here for history of colon polyps and to schedule colonoscopy. She has a past medical history of atrial fibrillation with RVR; bell palsy; h/o 24 hour EKG monitoring; h/o cardiovascular stress test; h/o echocardiogram; heart palpitations; history of cardiac monitoring; hyperlipidemia; hypertension; and obesity. Her last colonoscopy was done on 6- by Dr. 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 Her EGD done roughly fifteen years ago revealed per patient record normal results. She is on Xarelto for atrial fibrillation and monitored by Dr. Nilda Campbell. Her typical bowel pattern is daily with soft brown formed stools. No diarrhea or constipation. No blood in her stools or black tarry stools. Her appetite is good without early satiety. Her weight is stable. No nausea or vomiting. No excess belching or flatulence. Mild heartburn if she eats late at night. She takes Zantac twice per month. No nocturnal awakenings with acid reflux. No dysphagia or pain with swallowing. No family history of stomach or colon cancer. ROS    Review of Systems   Constitutional: Negative for chills, fever, malaise/fatigue and weight loss. HENT: Negative for ear pain, hearing loss and tinnitus. Eyes: Negative for blurred vision and pain. Respiratory: Positive for shortness of breath (with exertion). Negative for cough, sputum production and wheezing. Cardiovascular: Negative for chest pain, palpitations and leg swelling. Gastrointestinal: Positive for heartburn.  Negative for abdominal Monocytes # 04/10/2018 0.7  0.0 - 1.3 K/uL Final    Eosinophils # 04/10/2018 0.1  0.0 - 0.6 K/uL Final    Basophils # 04/10/2018 0.1  0.0 - 0.2 K/uL Final    CRP 04/10/2018 3.7  0.0 - 5.1 mg/L Final    Comment: WR-CRP Reference Range:  0D-29D      <0.6  30D-199Y    <5.1    CRP is used in the detection and evaluation of infection, tissue injury,  inflammatory disorders and associated diseases. Increases in CRP values  are non-specific and should not be interpreted without a complete  clinical evaluation.  Sodium 04/10/2018 136  136 - 145 mmol/L Final    Potassium 04/10/2018 4.1  3.5 - 5.1 mmol/L Final    Chloride 04/10/2018 95* 99 - 110 mmol/L Final    CO2 04/10/2018 29  21 - 32 mmol/L Final    Anion Gap 04/10/2018 12  3 - 16 Final    Glucose 04/10/2018 94  70 - 99 mg/dL Final    BUN 04/10/2018 24* 7 - 20 mg/dL Final    CREATININE 04/10/2018 0.7  0.6 - 1.1 mg/dL Final    GFR Non- 04/10/2018 >60  >60 Final    Comment: >60 mL/min/1.73m2 EGFR, calc. for ages 25 and older using the  MDRD formula (not corrected for weight), is valid for stable  renal function.  GFR  04/10/2018 >60  >60 Final    Comment: Chronic Kidney Disease: less than 60 ml/min/1.73 sq.m. Kidney Failure: less than 15 ml/min/1.73 sq.m. Results valid for patients 18 years and older.  Calcium 04/10/2018 8.9  8.3 - 10.6 mg/dL Final    Total Protein 04/10/2018 6.2* 6.4 - 8.2 g/dL Final    Alb 04/10/2018 4.3  3.4 - 5.0 g/dL Final    Albumin/Globulin Ratio 04/10/2018 2.3* 1.1 - 2.2 Final    Total Bilirubin 04/10/2018 0.4  0.0 - 1.0 mg/dL Final    Alkaline Phosphatase 04/10/2018 34* 40 - 129 U/L Final    ALT 04/10/2018 62* 10 - 40 U/L Final    AST 04/10/2018 38* 15 - 37 U/L Final    Globulin 04/10/2018 1.9  g/dL Final       Assessment        ICD-10-CM ICD-9-CM    1. History of colon polyps Z86.010 V12.72 COLONOSCOPY (Diagnostic)   2.  Coagulopathy (HCC) D68.9 286.9 COLONOSCOPY (Diagnostic)       Plan    1. Will plan for a colonoscopy with MAC anesthesia at [facility] due to BMI. The patient was informed of the risks and benefits of the procedure. 2.  Will obtain cardiac clearance from [facility] and mayra to stop Xarelto before her procedure. Discussed risks of bleeding if on during procedure and benefits of being off to have her procedure completed and polyps removed if indicated. 3.  The patient was provided with information on diverticulosis and high fiber diet. 4.  Further recommendations for follow-up will be determined after the colonoscopy has been completed.

## 2018-07-27 ENCOUNTER — TELEPHONE (OUTPATIENT)
Dept: CARDIOLOGY CLINIC | Age: 53
End: 2018-07-27

## 2018-07-30 ENCOUNTER — NURSE ONLY (OUTPATIENT)
Dept: FAMILY MEDICINE CLINIC | Age: 53
End: 2018-07-30

## 2018-07-30 ENCOUNTER — TELEPHONE (OUTPATIENT)
Dept: GASTROENTEROLOGY | Age: 53
End: 2018-07-30

## 2018-07-30 NOTE — TELEPHONE ENCOUNTER
Patient scheduled for c-scope at [facility] on 10/18 with Heath Galvin. I called patient to offer sooner procedure on 9/19/18 at 1000am, arrive at 900am or at 100pm, arrive at 1100am. Patient is going to check with her  and daughter and see if she can get a ride that date. Patient will call right back.

## 2018-08-01 ENCOUNTER — OFFICE VISIT (OUTPATIENT)
Dept: FAMILY MEDICINE CLINIC | Age: 53
End: 2018-08-01

## 2018-08-01 VITALS
DIASTOLIC BLOOD PRESSURE: 76 MMHG | RESPIRATION RATE: 16 BRPM | HEART RATE: 54 BPM | BODY MASS INDEX: 49.96 KG/M2 | OXYGEN SATURATION: 97 % | WEIGHT: 293 LBS | SYSTOLIC BLOOD PRESSURE: 124 MMHG

## 2018-08-01 DIAGNOSIS — L03.115 CELLULITIS AND ABSCESS OF RIGHT LOWER EXTREMITY: Primary | ICD-10-CM

## 2018-08-01 DIAGNOSIS — L02.415 CELLULITIS AND ABSCESS OF RIGHT LOWER EXTREMITY: Primary | ICD-10-CM

## 2018-08-01 DIAGNOSIS — S80.211A ABRASION, RIGHT KNEE, INITIAL ENCOUNTER: ICD-10-CM

## 2018-08-01 PROCEDURE — 99213 OFFICE O/P EST LOW 20 MIN: CPT | Performed by: NURSE PRACTITIONER

## 2018-08-01 RX ORDER — SULFAMETHOXAZOLE AND TRIMETHOPRIM 800; 160 MG/1; MG/1
1 TABLET ORAL 2 TIMES DAILY
Qty: 14 TABLET | Refills: 0 | Status: SHIPPED | OUTPATIENT
Start: 2018-08-01 | End: 2018-08-08

## 2018-08-01 RX ORDER — GINSENG 100 MG
CAPSULE ORAL
Qty: 14 G | Refills: 0 | Status: SHIPPED | OUTPATIENT
Start: 2018-08-01 | End: 2018-08-11

## 2018-08-01 ASSESSMENT — ENCOUNTER SYMPTOMS
DIARRHEA: 0
VOMITING: 0
NAUSEA: 0
CHEST TIGHTNESS: 0
COUGH: 0
SHORTNESS OF BREATH: 0
COLOR CHANGE: 1
WHEEZING: 0

## 2018-08-01 NOTE — PATIENT INSTRUCTIONS
We are committed to providing you the best care possible. If you receive a survey after visiting one of our offices, please take time to share your experience concerning your physician office visit. These surveys are confidential and no health information about you is shared. We are eager to improve for you and we are counting on your feedback to help make that happen. Patient Education        Cellulitis: Care Instructions  Your Care Instructions    Cellulitis is a skin infection caused by bacteria, most often strep or staph. It often occurs after a break in the skin from a scrape, cut, bite, or puncture, or after a rash. Cellulitis may be treated without doing tests to find out what caused it. But your doctor may do tests, if needed, to look for a specific bacteria, like methicillin-resistant Staphylococcus aureus (MRSA). The doctor has checked you carefully, but problems can develop later. If you notice any problems or new symptoms, get medical treatment right away. Follow-up care is a key part of your treatment and safety. Be sure to make and go to all appointments, and call your doctor if you are having problems. It's also a good idea to know your test results and keep a list of the medicines you take. How can you care for yourself at home? · Take your antibiotics as directed. Do not stop taking them just because you feel better. You need to take the full course of antibiotics. · Prop up the infected area on pillows to reduce pain and swelling. Try to keep the area above the level of your heart as often as you can. · If your doctor told you how to care for your wound, follow your doctor's instructions. If you did not get instructions, follow this general advice:  ¨ Wash the wound with clean water 2 times a day. Don't use hydrogen peroxide or alcohol, which can slow healing. ¨ You may cover the wound with a thin layer of petroleum jelly, such as Vaseline, and a nonstick bandage.   ¨ Apply more

## 2018-08-01 NOTE — PROGRESS NOTES
cardiovascular stress test 1/14/10    Joe protocol: normal stress study. gated study shows uniform wall motion with calculated LV EF of over 67 %    H/O echocardiogram 5/20/10    ECHO:  There is no comparison study available. There is mild concentric LV hypertrophy. LVSF is normal, EF = > 55 %    H/O echocardiogram 07/18/2014    EF55%, borderline left atrial enlargement, normal LV systolic function, mild mitral regurg, no pericardial effusion.  H/O echocardiogram 07/03/2017    EF55%  AV mild  sclerotic    Heart palpitations     History of cardiac monitoring 7/21/14    Event - no clinically significant arrythmias    History of cardiac monitoring 08/10/2017    No clinically signifacant arrythmias    History of complete ECG 5/11/10    Hyperlipidemia 4/10    Hypertension     Obesity        Current Outpatient Prescriptions on File Prior to Visit   Medication Sig Dispense Refill    bisacodyl (BISACODYL) 5 MG EC tablet Take 4 tablets once for colonoscopy prep 4 tablet 0    metoprolol tartrate (LOPRESSOR) 50 MG tablet Take 1 tablet by mouth 2 times daily 180 tablet 1    propafenone (RYTHMOL) 150 MG tablet Take 1.5 tablets by mouth every 8 hours 405 tablet 3    rivaroxaban (XARELTO) 20 MG TABS tablet Take 1 tablet by mouth daily 90 tablet 3    fenofibrate (TRICOR) 145 MG tablet Take 1 tablet by mouth daily 90 tablet 3    Compression Stockings MISC by Does not apply route Moderate Compression level 2 each 0    FISH OIL by Does not apply route 2 times daily. No current facility-administered medications on file prior to visit. Past Medical, Family, and Social History have been reviewed today. Review of Systems   Constitutional: Negative for chills, diaphoresis and fever. Respiratory: Negative for cough, chest tightness, shortness of breath and wheezing. Cardiovascular: Positive for leg swelling (slight pitting edema to right calf). Negative for chest pain and palpitations. Advised to keep area clean and dry  - Elevate as tolerable  - Monitor closely. If no improvement, advised to follow up here. - sulfamethoxazole-trimethoprim (BACTRIM DS) 800-160 MG per tablet; Take 1 tablet by mouth 2 times daily for 7 days  Dispense: 14 tablet; Refill: 0    2. Abrasion, right knee, initial encounter  - Wash site with warm water and mild soap. Stop using hydrogen peroxide routinely. - bacitracin 500 UNIT/GM ointment; Apply topically 2 times daily. Dispense: 14 g; Refill: 0      Return if symptoms worsen or fail to improve. Care discussed with patient. Patient educated on signs and symptoms of exacerbation and when to seek further medical attention. Advised to call for any problems, questions, or concerns. Patient verbalizes understanding and agrees with plan. Medications reviewed and reconciled. Continue current medications. Appropriate prescriptions are ordered. Risks and benefits of meds are discussed. After visit summary provided.

## 2018-08-08 ENCOUNTER — OFFICE VISIT (OUTPATIENT)
Dept: FAMILY MEDICINE CLINIC | Age: 53
End: 2018-08-08

## 2018-08-08 VITALS
HEART RATE: 53 BPM | WEIGHT: 293 LBS | BODY MASS INDEX: 48.82 KG/M2 | DIASTOLIC BLOOD PRESSURE: 72 MMHG | OXYGEN SATURATION: 96 % | HEIGHT: 65 IN | SYSTOLIC BLOOD PRESSURE: 120 MMHG | RESPIRATION RATE: 16 BRPM
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
## 2018-08-08 DIAGNOSIS — I48.0 PAROXYSMAL ATRIAL FIBRILLATION (HCC): ICD-10-CM

## 2018-08-08 DIAGNOSIS — S80.11XD CONTUSION OF RIGHT LOWER LEG, SUBSEQUENT ENCOUNTER: ICD-10-CM

## 2018-08-08 DIAGNOSIS — T14.8XXA ABRASION: ICD-10-CM

## 2018-08-08 DIAGNOSIS — Z79.01 ANTICOAGULATED: ICD-10-CM

## 2018-08-08 PROCEDURE — 99214 OFFICE O/P EST MOD 30 MIN: CPT | Performed by: FAMILY MEDICINE

## 2018-08-08 ASSESSMENT — ENCOUNTER SYMPTOMS
SHORTNESS OF BREATH: 0
BACK PAIN: 0
CONSTIPATION: 0
RESPIRATORY NEGATIVE: 1
EYES NEGATIVE: 1
HEARTBURN: 0
COUGH: 0
DIARRHEA: 0
GASTROINTESTINAL NEGATIVE: 1

## 2018-08-08 NOTE — PROGRESS NOTES
Chief Complaint   Patient presents with    Cellulitis     Follow up on right leg cellulitis// seen on 08/01/2018 still having issues. Just finished Bactrim yesterday. SUBJECTIVE:  Fell 3 weeks ago with residual abrasion on R knee and hit head requiring a staple in her head and a ct to r/o bleeding on xarelto. Last week got red spots on knee and given bactrim. Overall better. \"It feels better. \" less swelling and minimal pain. Tripped on steps in [place]. Current Outpatient Prescriptions on File Prior to Visit   Medication Sig Dispense Refill    metoprolol tartrate (LOPRESSOR) 50 MG tablet Take 1 tablet by mouth 2 times daily 180 tablet 1    propafenone (RYTHMOL) 150 MG tablet Take 1.5 tablets by mouth every 8 hours 405 tablet 3    rivaroxaban (XARELTO) 20 MG TABS tablet Take 1 tablet by mouth daily 90 tablet 3    fenofibrate (TRICOR) 145 MG tablet Take 1 tablet by mouth daily 90 tablet 3    Compression Stockings MISC by Does not apply route Moderate Compression level 2 each 0    FISH OIL by Does not apply route 2 times daily.  sulfamethoxazole-trimethoprim (BACTRIM DS) 800-160 MG per tablet Take 1 tablet by mouth 2 times daily for 7 days 14 tablet 0    bacitracin 500 UNIT/GM ointment Apply topically 2 times daily. 14 g 0    bisacodyl (BISACODYL) 5 MG EC tablet Take 4 tablets once for colonoscopy prep 4 tablet 0     No current facility-administered medications on file prior to visit. Allergies   Allergen Reactions    Cozaar [Losartan Potassium] Other (See Comments)     Cough    Lisinopril Other (See Comments)     Cough    Pcn [Penicillins]      Review of PMH, PSH, Family Hx, Allergies and updates made as needed. Review of Systems   Constitutional: Negative. HENT: Negative. Eyes: Negative. Respiratory: Negative. Negative for cough and shortness of breath. Cardiovascular: Negative. Negative for chest pain, palpitations and leg swelling.    Gastrointestinal: Negative. Negative for constipation, diarrhea and heartburn. Genitourinary: Negative. Negative for dysuria. Musculoskeletal: Negative for back pain and neck pain. Back and neck better   Skin: Negative. Dark spots on shin, abrasion on R knee   Neurological: Negative for dizziness, tingling, tremors, sensory change, speech change, focal weakness, seizures, loss of consciousness and headaches. Endo/Heme/Allergies: Negative. Psychiatric/Behavioral: Negative. OBJECTIVE:  /72 (Site: Left Arm, Position: Sitting, Cuff Size: Large Adult)   Pulse 53   Resp 16   Ht 5' 5\" (1.651 m)   Wt 296 lb (134.3 kg)   SpO2 96%   BMI 49.26 kg/m²     Physical Exam   Constitutional: She is oriented to person, place, and time. She appears well-developed and well-nourished. HENT:   Head: Normocephalic and atraumatic. Right Ear: External ear normal.   Left Ear: External ear normal.   Nose: Nose normal.   Mouth/Throat: Oropharynx is clear and moist.   Eyes: Conjunctivae and EOM are normal. Pupils are equal, round, and reactive to light. Neck: Normal range of motion. Neck supple. No JVD present. No tracheal deviation present. No thyromegaly present. Cardiovascular: Normal rate, regular rhythm and normal heart sounds. Pulmonary/Chest: Effort normal and breath sounds normal. She has no wheezes. She has no rales. Abdominal: Soft. Bowel sounds are normal. She exhibits no mass. Musculoskeletal: Normal range of motion. She exhibits no edema. Lymphadenopathy:     She has no cervical adenopathy. Neurological: She is alert and oriented to person, place, and time. She has normal reflexes. Skin: Skin is warm and dry. No rash noted. 1 cm healin g abrasion r knee n significant inflamation  R shkin 3 6 cm dark patches, likely resolving contusions. Psychiatric: She has a normal mood and affect.  Her behavior is normal. Judgment and thought content normal.     1. Fall (on) (from) other stairs and steps, sequela    2. Abrasion    3. Contusion of right lower leg, subsequent encounter    4. Paroxysmal atrial fibrillation (HCC)    5. Anticoagulated      1. Fall (on) (from) other stairs and steps, sequela  Head laceration healed, no cognitive changes. Abrasion knee heali ng   contusions on legs from bouncing down the steps. Walk more to improve fitness and reduce risk of falls. 2. Abrasion  Healing r knee no infection    3. Contusion of right lower leg, subsequent encounter  hea,ling    4. Paroxysmal atrial fibrillation (HCC)      5. Anticoagulated  Discussed risks.

## 2018-08-15 ENCOUNTER — TELEPHONE (OUTPATIENT)
Dept: GASTROENTEROLOGY | Age: 53
End: 2018-08-15

## 2018-08-15 NOTE — TELEPHONE ENCOUNTER
Called [facility] to see if pre-cert is required for c-scope on 10/18/18. I spoke with Camilo Precise gave him CPT codes 39446 [id number] and icd code Z86.010. He advised me that no pre-cert is required. Ref# [id number].

## 2018-10-12 ENCOUNTER — TELEPHONE (OUTPATIENT)
Dept: GASTROENTEROLOGY | Age: 53
End: 2018-10-12

## 2018-10-25 ENCOUNTER — TELEPHONE (OUTPATIENT)
Dept: CARDIOLOGY CLINIC | Age: 53
End: 2018-10-25

## 2018-10-26 ENCOUNTER — OFFICE VISIT (OUTPATIENT)
Dept: CARDIOLOGY CLINIC | Age: 53
End: 2018-10-26
Payer: COMMERCIAL

## 2018-10-26 VITALS
WEIGHT: 293 LBS | SYSTOLIC BLOOD PRESSURE: 138 MMHG | DIASTOLIC BLOOD PRESSURE: 86 MMHG | HEIGHT: 65 IN | BODY MASS INDEX: 48.82 KG/M2 | HEART RATE: 56 BPM

## 2018-10-26 DIAGNOSIS — I48.91 ATRIAL FIBRILLATION WITH RVR (HCC): Primary | ICD-10-CM

## 2018-10-26 DIAGNOSIS — E66.01 OBESITY, MORBID, BMI 40.0-49.9 (HCC): ICD-10-CM

## 2018-10-26 DIAGNOSIS — I10 ESSENTIAL HYPERTENSION: ICD-10-CM

## 2018-10-26 PROCEDURE — 99214 OFFICE O/P EST MOD 30 MIN: CPT | Performed by: INTERNAL MEDICINE

## 2018-10-26 PROCEDURE — 93000 ELECTROCARDIOGRAM COMPLETE: CPT | Performed by: INTERNAL MEDICINE

## 2018-10-26 RX ORDER — FENOFIBRATE 145 MG/1
145 TABLET, COATED ORAL DAILY
Qty: 90 TABLET | Refills: 3 | Status: SHIPPED | OUTPATIENT
Start: 2018-10-26 | End: 2018-10-26 | Stop reason: SDUPTHER

## 2018-10-26 RX ORDER — FENOFIBRATE 145 MG/1
145 TABLET, COATED ORAL DAILY
Qty: 90 TABLET | Refills: 3 | Status: SHIPPED | OUTPATIENT
Start: 2018-10-26 | End: 2019-11-05 | Stop reason: SDUPTHER

## 2018-10-26 NOTE — PROGRESS NOTES
[id number] Score for Atrial Fibrillation Stroke Risk   Risk   Factors  Component Value   C CHF No 0   H HTN Yes 1   A2 Age >= 76 No,  (52 y.o.) 0   D DM No 0   S2 Prior Stroke/TIA No 0   V Vascular Disease No 0   A Age 74-69 No,  (52 y.o.) 0   Sc Sex female 1    [id number]  Score  2   Score last updated 49/77/18 50:66 AM    Click here for a link to the UpToDate guideline \"Atrial Fibrillation: Anticoagulation therapy to prevent embolization    Disclaimer: Risk Score calculation is dependent on accuracy of patient problem list and past encounter diagnosis.
colonoscopy prep 4 tablet 0     No current facility-administered medications for this visit. Allergies: Cozaar [losartan potassium]; Lisinopril; and Pcn [penicillins]  Past Medical History:   Diagnosis Date    Atrial fibrillation with RVR ([address] 2017    Bell palsy     right side of face affected    H/O 24 hour EKG monitoring 3/30/10    holter: other then an episode of sinus tach at 150 bpm no other clinically significant arrhythmia seen. Symptoms reported in pts diary do not correlate with this episode    H/O cardiovascular stress test 1/14/10    Joe protocol: normal stress study. gated study shows uniform wall motion with calculated LV EF of over 67 %    H/O echocardiogram 5/20/10    ECHO:  There is no comparison study available. There is mild concentric LV hypertrophy. LVSF is normal, EF = > 55 %    H/O echocardiogram 2014    EF55%, borderline left atrial enlargement, normal LV systolic function, mild mitral regurg, no pericardial effusion.     H/O echocardiogram 2017    EF55%  AV mild  sclerotic    Heart palpitations     History of cardiac monitoring 14    Event - no clinically significant arrythmias    History of cardiac monitoring 08/10/2017    No clinically signifacant arrythmias    History of complete ECG 5/11/10    Hyperlipidemia 4/10    Hypertension     Obesity      Past Surgical History:   Procedure Laterality Date     SECTION  ,     times 2    ENDOSCOPY, COLON, DIAGNOSTIC      ROTATOR CUFF REPAIR Right 10/2012      As reviewed   Family History   Problem Relation Age of Onset    High Blood Pressure Mother     Cancer Mother     Heart Disease Father     Heart Surgery Father         cabg x 2    Other Father         AAA    Coronary Art Dis Father     Heart Attack Brother      Social History   Substance Use Topics    Smoking status: Former Smoker     Packs/day: 0.50     Years: 30.00     Quit date: 2002    Smokeless tobacco: Never Used

## 2018-10-28 RX ORDER — PROPAFENONE HYDROCHLORIDE 150 MG/1
225 TABLET, FILM COATED ORAL EVERY 8 HOURS SCHEDULED
Qty: 405 TABLET | Refills: 3 | Status: SHIPPED | OUTPATIENT
Start: 2018-10-28 | End: 2019-12-24 | Stop reason: SDUPTHER

## 2018-10-29 ENCOUNTER — TELEPHONE (OUTPATIENT)
Dept: CARDIOLOGY CLINIC | Age: 53
End: 2018-10-29

## 2018-10-29 NOTE — TELEPHONE ENCOUNTER
Patient called stating that her prescription refill was called in but she just wants to make sure the quantity will be enough for her to take it 3x/daily; please call her back at ph# [phone]

## 2018-11-06 ENCOUNTER — TELEPHONE (OUTPATIENT)
Dept: GASTROENTEROLOGY | Age: 53
End: 2018-11-06

## 2018-11-07 ENCOUNTER — PROCEDURE VISIT (OUTPATIENT)
Dept: CARDIOLOGY CLINIC | Age: 53
End: 2018-11-07
Payer: COMMERCIAL

## 2018-11-07 DIAGNOSIS — I48.91 ATRIAL FIBRILLATION WITH RVR (HCC): ICD-10-CM

## 2018-11-07 DIAGNOSIS — E66.01 OBESITY, MORBID, BMI 40.0-49.9 (HCC): ICD-10-CM

## 2018-11-07 DIAGNOSIS — I10 ESSENTIAL HYPERTENSION: ICD-10-CM

## 2018-11-07 DIAGNOSIS — G47.33 OSA TREATED WITH BIPAP: Primary | ICD-10-CM

## 2018-11-07 LAB
LV EF: 58 %
LVEF MODALITY: NORMAL

## 2018-11-07 PROCEDURE — 93306 TTE W/DOPPLER COMPLETE: CPT | Performed by: INTERNAL MEDICINE

## 2018-11-08 ENCOUNTER — TELEPHONE (OUTPATIENT)
Dept: CARDIOLOGY CLINIC | Age: 53
End: 2018-11-08

## 2018-11-08 NOTE — TELEPHONE ENCOUNTER
Left ventricular systolic function is normal with an ejection fraction of   55-60%.   Sclerotic, but non-stenotic aortic valve.   No other significant valvulopathy seen.   No evidence of pericardial effusion.     Results to patient

## 2018-11-09 ENCOUNTER — TELEPHONE (OUTPATIENT)
Dept: GASTROENTEROLOGY | Age: 53
End: 2018-11-09

## 2018-11-09 NOTE — TELEPHONE ENCOUNTER
Patient called her ins co and since she has met her deductible she needs a procedure before the end of the year. I rescheduled her procedure to 11/15/18 at at 1000am, arrive at 800am. Patient expressed understanding and had no further questions. [facility] notified via fax. New letter mailed.

## 2018-11-13 ENCOUNTER — TELEPHONE (OUTPATIENT)
Dept: GASTROENTEROLOGY | Age: 53
End: 2018-11-13

## 2018-11-13 NOTE — TELEPHONE ENCOUNTER
Vania Welsh from [facility] called to confirm that we received cardiac clearance for this patient to have procedure with Karolina Posey. I confirmed that we did receive it. She expressed understanding.

## 2018-11-14 ENCOUNTER — ANESTHESIA EVENT (OUTPATIENT)
Dept: ENDOSCOPY | Age: 53
End: 2018-11-14
Payer: COMMERCIAL

## 2018-11-15 ENCOUNTER — HOSPITAL ENCOUNTER (OUTPATIENT)
Age: 53
Setting detail: OUTPATIENT SURGERY
Discharge: HOME | End: 2018-11-15
Attending: INTERNAL MEDICINE | Admitting: INTERNAL MEDICINE
Payer: COMMERCIAL

## 2018-11-15 ENCOUNTER — ANESTHESIA (OUTPATIENT)
Dept: ENDOSCOPY | Age: 53
End: 2018-11-15
Payer: COMMERCIAL

## 2018-11-15 VITALS
TEMPERATURE: 97.1 F | HEART RATE: 68 BPM | BODY MASS INDEX: 47.82 KG/M2 | SYSTOLIC BLOOD PRESSURE: 128 MMHG | DIASTOLIC BLOOD PRESSURE: 78 MMHG | HEIGHT: 65 IN | OXYGEN SATURATION: 96 % | RESPIRATION RATE: 16 BRPM | WEIGHT: 287 LBS

## 2018-11-15 VITALS — DIASTOLIC BLOOD PRESSURE: 61 MMHG | OXYGEN SATURATION: 97 % | SYSTOLIC BLOOD PRESSURE: 126 MMHG

## 2018-11-15 PROBLEM — K63.5 COLON POLYPS: Status: ACTIVE | Noted: 2018-11-15

## 2018-11-15 LAB
APTT: 32.3 SECONDS (ref 21.2–33)
INR BLD: 1.04 INDEX
PREGNANCY TEST URINE, POC: NEGATIVE
PROTHROMBIN TIME: 11.9 SECONDS (ref 9.12–12.5)

## 2018-11-15 PROCEDURE — 3609010200 HC COLONOSCOPY ABLATION TUMOR POLYP/OTHER LES: Performed by: INTERNAL MEDICINE

## 2018-11-15 PROCEDURE — 2580000003 HC RX 258: Performed by: ANESTHESIOLOGY

## 2018-11-15 PROCEDURE — 45385 COLONOSCOPY W/LESION REMOVAL: CPT | Performed by: INTERNAL MEDICINE

## 2018-11-15 PROCEDURE — 85610 PROTHROMBIN TIME: CPT

## 2018-11-15 PROCEDURE — 6360000002 HC RX W HCPCS: Performed by: NURSE ANESTHETIST, CERTIFIED REGISTERED

## 2018-11-15 PROCEDURE — 3700000001 HC ADD 15 MINUTES (ANESTHESIA): Performed by: INTERNAL MEDICINE

## 2018-11-15 PROCEDURE — 2720000010 HC SURG SUPPLY STERILE: Performed by: INTERNAL MEDICINE

## 2018-11-15 PROCEDURE — 81025 URINE PREGNANCY TEST: CPT

## 2018-11-15 PROCEDURE — 3700000000 HC ANESTHESIA ATTENDED CARE: Performed by: INTERNAL MEDICINE

## 2018-11-15 PROCEDURE — 7100000010 HC PHASE II RECOVERY - FIRST 15 MIN: Performed by: INTERNAL MEDICINE

## 2018-11-15 PROCEDURE — 2709999900 HC NON-CHARGEABLE SUPPLY: Performed by: INTERNAL MEDICINE

## 2018-11-15 PROCEDURE — 88305 TISSUE EXAM BY PATHOLOGIST: CPT

## 2018-11-15 PROCEDURE — 7100000011 HC PHASE II RECOVERY - ADDTL 15 MIN: Performed by: INTERNAL MEDICINE

## 2018-11-15 PROCEDURE — C1773 RET DEV, INSERTABLE: HCPCS | Performed by: INTERNAL MEDICINE

## 2018-11-15 PROCEDURE — 2500000003 HC RX 250 WO HCPCS: Performed by: NURSE ANESTHETIST, CERTIFIED REGISTERED

## 2018-11-15 PROCEDURE — 85730 THROMBOPLASTIN TIME PARTIAL: CPT

## 2018-11-15 PROCEDURE — 2580000003 HC RX 258

## 2018-11-15 DEVICE — CLIP INT L235CM WRK CHN DIA2.8MM OPN 11MM BRAID CATH ROT BX/10: Type: IMPLANTABLE DEVICE | Status: FUNCTIONAL

## 2018-11-15 RX ORDER — LIDOCAINE HYDROCHLORIDE 20 MG/ML
INJECTION, SOLUTION INFILTRATION; PERINEURAL PRN
Status: DISCONTINUED | OUTPATIENT
Start: 2018-11-15 | End: 2018-11-15 | Stop reason: SDUPTHER

## 2018-11-15 RX ORDER — PROPOFOL 10 MG/ML
INJECTION, EMULSION INTRAVENOUS PRN
Status: DISCONTINUED | OUTPATIENT
Start: 2018-11-15 | End: 2018-11-15 | Stop reason: SDUPTHER

## 2018-11-15 RX ORDER — SODIUM CHLORIDE, SODIUM LACTATE, POTASSIUM CHLORIDE, CALCIUM CHLORIDE 600; 310; 30; 20 MG/100ML; MG/100ML; MG/100ML; MG/100ML
INJECTION, SOLUTION INTRAVENOUS CONTINUOUS
Status: DISCONTINUED | OUTPATIENT
Start: 2018-11-15 | End: 2018-11-15 | Stop reason: HOSPADM

## 2018-11-15 RX ORDER — SODIUM CHLORIDE, SODIUM LACTATE, POTASSIUM CHLORIDE, CALCIUM CHLORIDE 600; 310; 30; 20 MG/100ML; MG/100ML; MG/100ML; MG/100ML
INJECTION, SOLUTION INTRAVENOUS
Status: COMPLETED
Start: 2018-11-15 | End: 2018-11-15

## 2018-11-15 RX ADMIN — PROPOFOL 50 MG: 10 INJECTION, EMULSION INTRAVENOUS at 09:20

## 2018-11-15 RX ADMIN — PROPOFOL 50 MG: 10 INJECTION, EMULSION INTRAVENOUS at 09:30

## 2018-11-15 RX ADMIN — PROPOFOL 50 MG: 10 INJECTION, EMULSION INTRAVENOUS at 09:19

## 2018-11-15 RX ADMIN — SODIUM CHLORIDE, POTASSIUM CHLORIDE, SODIUM LACTATE AND CALCIUM CHLORIDE: 600; 310; 30; 20 INJECTION, SOLUTION INTRAVENOUS at 08:32

## 2018-11-15 RX ADMIN — LIDOCAINE HYDROCHLORIDE 50 MG: 20 INJECTION, SOLUTION INFILTRATION; PERINEURAL at 09:19

## 2018-11-15 RX ADMIN — PROPOFOL 20 MG: 10 INJECTION, EMULSION INTRAVENOUS at 09:36

## 2018-11-15 RX ADMIN — SODIUM CHLORIDE, POTASSIUM CHLORIDE, SODIUM LACTATE AND CALCIUM CHLORIDE: 600; 310; 30; 20 INJECTION, SOLUTION INTRAVENOUS at 09:17

## 2018-11-15 RX ADMIN — PROPOFOL 50 MG: 10 INJECTION, EMULSION INTRAVENOUS at 09:23

## 2018-11-15 ASSESSMENT — PAIN SCALES - GENERAL
PAINLEVEL_OUTOF10: 0

## 2018-11-15 ASSESSMENT — PAIN - FUNCTIONAL ASSESSMENT: PAIN_FUNCTIONAL_ASSESSMENT: 0-10

## 2018-11-15 ASSESSMENT — PAIN DESCRIPTION - DESCRIPTORS: DESCRIPTORS: CRAMPING

## 2018-11-15 NOTE — H&P
Dose Route Frequency Provider Last Rate Last Dose    lactated ringers infusion   Intravenous Continuous Noemi Andino MD        lactated ringers infusion                Allergies: Allergies   Allergen Reactions    Cozaar [Losartan Potassium] Other (See Comments)     Cough    Lisinopril Other (See Comments)     Cough    Pcn [Penicillins] Other (See Comments)     Unknown as a child think a rash       Social History:    Social History     Social History    Marital status:      Spouse name: N/A    Number of children: 2    Years of education: N/A     Occupational History    full time      Social History Main Topics    Smoking status: Former Smoker     Packs/day: 0.50     Years: 30.00     Types: Cigarettes     Quit date: 6/26/2002    Smokeless tobacco: Never Used      Comment: 11/11/15    Alcohol use No    Drug use: No    Sexual activity: Yes     Partners: Male     Other Topics Concern    Not on file     Social History Narrative    No narrative on file       Family History:    Family History   Problem Relation Age of Onset    High Blood Pressure Mother     Cancer Mother         lung ca    Heart Disease Father     Heart Surgery Father         cabg x 2    Other Father         AAA    Diabetes Father     Heart Attack Brother          Review of Systems:  Constitutional: No weight loss, no fevers. Eyes: No problems with vision. ENT: No nose or sinus problems, no oral problems, no throat problems or hoarseness. Cardiovascular: No chest pain, no leg pain with walking, no palpitations, no ankle swelling. Respiratory: No shortness of breath, no persistent cough, no wheezing. Endocrine: No increased thirst, no increased urination. Gastrointestinal: No heartburn, no dysphagia, no abdominal pain, no loss of appetite, no nausea or vomiting, no diarrhea, no constipation, no melena, no hematochezia, no sceleral icterus or jaundice. Skin: No rashes.   Musculoskeletal: No trouble walking or standing, no joint pain, no muscle pain. Allergy/Immune System: No allergies, no frequent infections. Neurological: No memory difficulties, no temporary blindness, no difficulty speaking, no headaches, no numbness. Psychiatric: No depression, no suicidal ideation, no auditory hallucinations. Hematological/Lymphatic: No lymphadenopathy, no frequent nose bleeds, no easy bruising. Genitourinary: No penile/vaginal discharge, no pain with urination, no trouble starting urinary stream, no hematuria. Physical Examination  Vital Signs: BP (!) 141/67   Pulse 54   Temp 96.9 °F (36.1 °C) (Temporal)   Resp 16   Ht 5' 5\" (1.651 m)   Wt 287 lb (130.2 kg)   LMP 09/19/2018   SpO2 95%   Breastfeeding? No   BMI 47.76 kg/m²  Body mass index is 47.76 kg/m². General: The patient is a 52 y.o. female in No acute distress. EYE: EOMI, Gross visual field was normal. Pupils reactive, The conjunctive was normal, with no erythema. ENT: no lymphadenopathy, oropharynx is without erythema, edema, or exudates, and moist mucus membranes. The nasal mucosa, septum and turbinates were normal without inflammation or edema. Neck: There was no mass on palpitation, tracheal position was in the middle of the neck and there was no enlarged thyroid. There was no JVD. Lungs: The respiratory was not in labor and the patient did not use accessory muscle. Clear to auscultation bilaterally, no wheeze/crackles. Cardiovascular: Regular rate and rhythm, normal S1 & S2, no murmurs, rubs or gallops appreciated. Peripheral pulses were normal and no tenderness. Abdomen: Soft, non-tender, no rebound or guarding or peritoneal features, no masses, no hepatosplenomegaly. Extremities: upper and lower extremities were warm and dry, no clubbing, cyanosis, edema. Neuro: CN II-XII were intact grossly. Sensation was normal on all extremities and the muscle strength was normal and symmetry. Rectum:  There was no fistular, fissure, external hemorrhoid, tenderness, abscess, erythema or discharge    Labs:  CBC  WBC   Date Value Ref Range Status   04/10/2018 8.1 4.0 - 11.0 K/uL Final     Hemoglobin   Date Value Ref Range Status   04/10/2018 13.6 12.0 - 16.0 g/dL Final     Hematocrit   Date Value Ref Range Status   04/10/2018 40.3 36.0 - 48.0 % Final     MCV   Date Value Ref Range Status   04/10/2018 88.9 80.0 - 100.0 fL Final        Glucose   Date Value Ref Range Status   04/10/2018 94 70 - 99 mg/dL Final     CO2   Date Value Ref Range Status   04/10/2018 29 21 - 32 mmol/L Final     BUN   Date Value Ref Range Status   04/10/2018 24 (H) 7 - 20 mg/dL Final     Lab Results   Component Value Date    ALT 62 04/10/2018    AST 38 04/10/2018     No results found for: AMYLASE  No results found for: LIPASE  No results found for: ESR  No components found for: CREACTIVEPR  No results found for: INDRA No components found for: ANTISMA, ANTIMITO  No results found for: CEA  No components found for: OCCULTBLOOD, OCCBLDSINPOC, OCCULTBLOODS, OCCBLD1, OCCBLD2, OCCBLD3, OCCULTBLOOD  No results found for: IRON, FERRITIN  No results found for: HAV    Assessment     Assessment and Plan:    colonoscopy    Renee Arnett MD PhD [facility]  [address] [facility] [address]  0ffice: [phone]  Fax: [phone]

## 2018-11-16 ENCOUNTER — TELEPHONE (OUTPATIENT)
Dept: GASTROENTEROLOGY | Age: 53
End: 2018-11-16

## 2018-12-11 ENCOUNTER — TELEPHONE (OUTPATIENT)
Dept: GASTROENTEROLOGY | Age: 53
End: 2018-12-11

## 2019-01-11 ENCOUNTER — TELEPHONE (OUTPATIENT)
Dept: CARDIOLOGY CLINIC | Age: 54
End: 2019-01-11

## 2019-01-11 RX ORDER — METOPROLOL TARTRATE 50 MG/1
50 TABLET, FILM COATED ORAL 2 TIMES DAILY
Qty: 180 TABLET | Refills: 2 | Status: SHIPPED | OUTPATIENT
Start: 2019-01-11 | End: 2019-10-01 | Stop reason: SDUPTHER

## 2019-02-22 ENCOUNTER — TELEPHONE (OUTPATIENT)
Dept: FAMILY MEDICINE CLINIC | Age: 54
End: 2019-02-22

## 2019-02-22 DIAGNOSIS — J18.9 PNEUMONIA OF LEFT LOWER LOBE DUE TO INFECTIOUS ORGANISM: ICD-10-CM

## 2019-02-22 RX ORDER — AZITHROMYCIN 250 MG/1
TABLET, FILM COATED ORAL
Qty: 1 PACKET | Refills: 0 | Status: SHIPPED | OUTPATIENT
Start: 2019-02-22 | End: 2019-03-04

## 2019-03-12 ENCOUNTER — OFFICE VISIT (OUTPATIENT)
Dept: FAMILY MEDICINE CLINIC | Age: 54
End: 2019-03-12
Payer: COMMERCIAL

## 2019-03-12 VITALS
SYSTOLIC BLOOD PRESSURE: 138 MMHG | RESPIRATION RATE: 18 BRPM | WEIGHT: 293 LBS | TEMPERATURE: 98.1 F | BODY MASS INDEX: 49.42 KG/M2 | DIASTOLIC BLOOD PRESSURE: 80 MMHG | HEART RATE: 58 BPM | OXYGEN SATURATION: 96 %

## 2019-03-12 DIAGNOSIS — J40 BRONCHITIS: ICD-10-CM

## 2019-03-12 DIAGNOSIS — R05.9 COUGH: Primary | ICD-10-CM

## 2019-03-12 DIAGNOSIS — R05.9 COUGH: ICD-10-CM

## 2019-03-12 PROCEDURE — 99213 OFFICE O/P EST LOW 20 MIN: CPT | Performed by: NURSE PRACTITIONER

## 2019-03-12 RX ORDER — ALBUTEROL SULFATE 90 UG/1
2 AEROSOL, METERED RESPIRATORY (INHALATION) EVERY 6 HOURS PRN
Qty: 1 INHALER | Refills: 0 | Status: SHIPPED | OUTPATIENT
Start: 2019-03-12 | End: 2019-03-12 | Stop reason: SDUPTHER

## 2019-03-12 RX ORDER — GUAIFENESIN AND CODEINE PHOSPHATE 100; 10 MG/5ML; MG/5ML
5 SOLUTION ORAL EVERY 4 HOURS PRN
Qty: 150 ML | Refills: 0 | Status: SHIPPED | OUTPATIENT
Start: 2019-03-12 | End: 2019-03-17

## 2019-03-12 RX ORDER — ALBUTEROL SULFATE 90 UG/1
2 AEROSOL, METERED RESPIRATORY (INHALATION) EVERY 6 HOURS PRN
Qty: 54 G | Refills: 0 | Status: SHIPPED | OUTPATIENT
Start: 2019-03-12 | End: 2022-03-23

## 2019-03-12 RX ORDER — DOXYCYCLINE HYCLATE 100 MG
100 TABLET ORAL 2 TIMES DAILY
Qty: 14 TABLET | Refills: 0 | Status: SHIPPED | OUTPATIENT
Start: 2019-03-12 | End: 2019-03-19

## 2019-03-12 ASSESSMENT — PATIENT HEALTH QUESTIONNAIRE - PHQ9
1. LITTLE INTEREST OR PLEASURE IN DOING THINGS: 0
SUM OF ALL RESPONSES TO PHQ9 QUESTIONS 1 & 2: 0
2. FEELING DOWN, DEPRESSED OR HOPELESS: 0
SUM OF ALL RESPONSES TO PHQ QUESTIONS 1-9: 0
SUM OF ALL RESPONSES TO PHQ QUESTIONS 1-9: 0

## 2019-03-12 ASSESSMENT — ENCOUNTER SYMPTOMS
EYE ITCHING: 0
VOMITING: 0
DIARRHEA: 0
SINUS PAIN: 0
NAUSEA: 0
SHORTNESS OF BREATH: 1
SORE THROAT: 0
SINUS PRESSURE: 0
EYE REDNESS: 0
COUGH: 1
EYE DISCHARGE: 0

## 2019-05-28 ENCOUNTER — OFFICE VISIT (OUTPATIENT)
Dept: CARDIOLOGY CLINIC | Age: 54
End: 2019-05-28
Payer: COMMERCIAL

## 2019-05-28 VITALS
WEIGHT: 293 LBS | BODY MASS INDEX: 48.82 KG/M2 | DIASTOLIC BLOOD PRESSURE: 78 MMHG | HEIGHT: 65 IN | HEART RATE: 60 BPM | SYSTOLIC BLOOD PRESSURE: 128 MMHG

## 2019-05-28 DIAGNOSIS — I10 ESSENTIAL HYPERTENSION: Primary | ICD-10-CM

## 2019-05-28 DIAGNOSIS — E78.5 HYPERLIPIDEMIA, UNSPECIFIED HYPERLIPIDEMIA TYPE: ICD-10-CM

## 2019-05-28 PROCEDURE — 99214 OFFICE O/P EST MOD 30 MIN: CPT | Performed by: INTERNAL MEDICINE

## 2019-05-28 NOTE — PROGRESS NOTES
Does not apply route Moderate Compression level 2 each 0    FISH OIL by Does not apply route 2 times daily. No current facility-administered medications for this visit. Allergies: Cozaar [losartan potassium]; Lisinopril; and Pcn [penicillins]  Past Medical History:   Diagnosis Date    Atrial fibrillation with RVR ([address] 07/01/2017    Bell palsy     right side of face affected    H/O 24 hour EKG monitoring 3/30/10    holter: other then an episode of sinus tach at 150 bpm no other clinically significant arrhythmia seen. Symptoms reported in pts diary do not correlate with this episode    H/O cardiovascular stress test 1/14/10    Joe protocol: normal stress study. gated study shows uniform wall motion with calculated LV EF of over 67 %    H/O echocardiogram 5/20/10    ECHO:  There is no comparison study available. There is mild concentric LV hypertrophy. LVSF is normal, EF = > 55 %    H/O echocardiogram 07/18/2014    EF55%, borderline left atrial enlargement, normal LV systolic function, mild mitral regurg, no pericardial effusion.     H/O echocardiogram 07/03/2017    EF55%  AV mild  sclerotic    H/O echocardiogram 11/07/2018    EF55-60% sclerotic non stenosed AV    Heart palpitations     History of cardiac monitoring 7/21/14    Event - no clinically significant arrythmias    History of cardiac monitoring 08/10/2017    No clinically signifacant arrythmias    History of complete ECG 5/11/10    HX OTHER MEDICAL     \"not sure if have sleep apnea- have cpap but the last sleep study not conclusive so having another sleep study done at [facility] a cpap but never offically dx with sleep apnea\"    Hyperlipidemia 4/10    Hypertension     follows with Dr Alba Sanchez Obesity      Past Surgical History:   Procedure Laterality Date   [address]    times 2    COLONOSCOPY  2016    COLONOSCOPY N/A 11/15/2018    COLONOSCOPY POLYPECTOMY ABLATION/SNARE/HEMOCLIPS X4 TO PROXIMAL ASCENDING POLYPECTOMY SITE performed by Kathie Apple MD at [facility], COLON, DIAGNOSTIC  2000    EYE SURGERY  2016    gavino cataract ext    ROTATOR CUFF REPAIR Right 10/2012      As reviewed   Family History   Problem Relation Age of Onset    High Blood Pressure Mother     Cancer Mother         lung ca    Heart Disease Father     Heart Surgery Father         cabg x 2    Other Father         AAA    Diabetes Father     Heart Attack Brother      Social History     Tobacco Use    Smoking status: Former Smoker     Packs/day: 0.50     Years: 30.00     Pack years: 15.00     Types: Cigarettes     Last attempt to quit: 2002     Years since quittin.9    Smokeless tobacco: Never Used   Substance Use Topics    Alcohol use: No     Alcohol/week: 0.0 oz      Review of Systems:    Constitutional: Negative for diaphoresis and fatigue  Psychological:Negative for anxiety or depression  HENT: Negative for headaches, nasal congestion, sinus pain or vertigo  Eyes: Negative for visual disturbance. Endocrine: Negative for polydipsia/polyuria  Respiratory: Negative for shortness of breath  Cardiovascular: Negative for chest pain, dyspnea on exertion, claudication, edema, irregular heartbeat, murmur, palpitations or shortness of breath  Gastrointestinal: Negative for abdominal pain or heartburn  Genito-Urinary: Negative for urinary frequency/urgency  Musculoskeletal: Negative for muscle pain, muscular weakness, negative for pain in arm and leg or swelling in foot and leg  Neurological: Negative for dizziness, headaches, memory loss, numbness/tingling, visual changes, syncope  Dermatological: Negative for rash    Objective:  /78   Pulse 60   Ht 5' 5\" (1.651 m)   Wt (!) 302 lb 6.4 oz (137.2 kg)   BMI 50.32 kg/m²   Wt Readings from Last 3 Encounters:   19 (!) 302 lb 6.4 oz (137.2 kg)   19 297 lb (134.7 kg)   18 287 lb (130.2 kg)     Body mass index is 50.32 kg/m².   GENERAL - Alert, oriented, pleasant, in no apparent distress. EYES: No jaundice, no conjunctival pallor. SKIN: It is warm & dry. No rashes. No Echhymosis    HEENT - No clinically significant abnormalities seen. Neck - Supple. No jugular venous distention noted. No carotid bruits. Cardiovascular - Normal S1 and S2 without obvious murmur or gallop. Extremities - No cyanosis, clubbing, or significant edema. Pulmonary - No respiratory distress. No wheezes or rales. Abdomen - No masses, tenderness, or organomegaly. Musculoskeletal - No significant edema. No joint deformities. No muscle wasting. Neurologic - Cranial nerves II through XII are grossly intact. There were no gross focal neurologic abnormalities. Lab Review   Lab Results   Component Value Date    TROPONINT <0.010 07/02/2017     BNP:  No results found for: BNP  PT/INR:    Lab Results   Component Value Date    INR 1.04 11/15/2018     Lab Results   Component Value Date    LABA1C 5.3 06/22/2015     Lab Results   Component Value Date    WBC 8.1 04/10/2018    HCT 40.3 04/10/2018    MCV 88.9 04/10/2018     04/10/2018     Lab Results   Component Value Date    CHOL 173 08/04/2017    TRIG 138 08/04/2017    HDL 41 08/04/2017    LDLCALC 110 (H) 10/26/2016     Lab Results   Component Value Date    ALT 62 (H) 04/10/2018    AST 38 (H) 04/10/2018     BMP:    Lab Results   Component Value Date     04/10/2018    K 4.1 04/10/2018    CL 95 04/10/2018    CO2 29 04/10/2018    BUN 24 04/10/2018    CREATININE 0.7 04/10/2018     CMP:   Lab Results   Component Value Date     04/10/2018    K 4.1 04/10/2018    CL 95 04/10/2018    CO2 29 04/10/2018    BUN 24 04/10/2018    PROT 6.2 04/10/2018     TSH:    Lab Results   Component Value Date    TSH 3.54 10/26/2016    TSHHS 4.210 07/01/2017         Impression:    No diagnosis found.    Patient Active Problem List   Diagnosis Code    Hyperlipidemia E78.5    Obesity, morbid, BMI 40.0-49.9 [facility] E66.01    H/O echocardiogram M64.13    Metabolic syndrome N31.74    Venous stasis dermatitis of both lower extremities I87.2    Dependent edema R60.9    Atrial fibrillation with RVR (HCC) I48.91    Essential hypertension I10    Paroxysmal atrial fibrillation (HCC) I48.0    MARLINE treated with BiPAP G47.33    Neck pain on left side M54.2    Cervical radiculopathy M54.12    Left lateral epicondylitis M77.12    Anticoagulated Z79.01    Colon polyps K63.5       Assessment & Plan:    -  Hypertension: Patients blood pressure is normal. Patient is advised about low sodium diet. Present medical regimen will not be changed. - A Fib on rythmol and xarelto  CPM        - Pulm HTN  Reecho   ? From MARLINE        -  LIPID MANAGEMENT:  Available lipid  lab data reviewed  and patient was given dietary advice. NCEP- ATP III guidelines reviewed with patient. -   Changes  in medicines made: No                         Mortality from the morbid obesity is very high: Body mass index is 50.32 kg/m².             - ? MARLINE per neuro          Rubén Marks MA  [facility]

## 2019-08-06 ENCOUNTER — TELEPHONE (OUTPATIENT)
Dept: CARDIOLOGY CLINIC | Age: 54
End: 2019-08-06

## 2019-10-02 RX ORDER — METOPROLOL TARTRATE 50 MG/1
50 TABLET, FILM COATED ORAL 2 TIMES DAILY
Qty: 180 TABLET | Refills: 3 | Status: SHIPPED | OUTPATIENT
Start: 2019-10-02 | End: 2020-09-23

## 2019-11-05 DIAGNOSIS — E78.5 HYPERLIPIDEMIA, UNSPECIFIED HYPERLIPIDEMIA TYPE: Primary | ICD-10-CM

## 2019-11-05 RX ORDER — FENOFIBRATE 145 MG/1
145 TABLET, COATED ORAL DAILY
Qty: 90 TABLET | Refills: 3 | Status: SHIPPED | OUTPATIENT
Start: 2019-11-05 | End: 2020-10-28

## 2019-12-23 DIAGNOSIS — I48.91 ATRIAL FIBRILLATION, UNSPECIFIED TYPE (HCC): ICD-10-CM

## 2019-12-23 DIAGNOSIS — E78.5 HYPERLIPIDEMIA, UNSPECIFIED HYPERLIPIDEMIA TYPE: Primary | ICD-10-CM

## 2019-12-24 ENCOUNTER — TELEPHONE (OUTPATIENT)
Dept: CARDIOLOGY CLINIC | Age: 54
End: 2019-12-24

## 2019-12-24 RX ORDER — PROPAFENONE HYDROCHLORIDE 150 MG/1
225 TABLET, FILM COATED ORAL EVERY 8 HOURS SCHEDULED
Qty: 90 TABLET | Refills: 1 | Status: SHIPPED | OUTPATIENT
Start: 2019-12-24 | End: 2020-01-20 | Stop reason: SDUPTHER

## 2020-01-20 ENCOUNTER — HOSPITAL ENCOUNTER (OUTPATIENT)
Age: 55
Discharge: HOME | End: 2020-01-20
Payer: COMMERCIAL

## 2020-01-20 DIAGNOSIS — E78.5 HYPERLIPIDEMIA, UNSPECIFIED HYPERLIPIDEMIA TYPE: ICD-10-CM

## 2020-01-20 DIAGNOSIS — I48.91 ATRIAL FIBRILLATION, UNSPECIFIED TYPE (HCC): ICD-10-CM

## 2020-01-20 LAB
ALBUMIN SERPL-MCNC: 4.4 GM/DL (ref 3.4–5)
ALP BLD-CCNC: 33 IU/L (ref 40–129)
ALT SERPL-CCNC: 15 U/L (ref 10–40)
AST SERPL-CCNC: 19 IU/L (ref 15–37)
BILIRUB SERPL-MCNC: 0.4 MG/DL (ref 0–1)
BILIRUBIN DIRECT: 0.2 MG/DL (ref 0–0.3)
BILIRUBIN, INDIRECT: 0.2 MG/DL (ref 0–0.7)
CHOLESTEROL, FASTING: 137 MG/DL
HDLC SERPL-MCNC: 42 MG/DL
LDL CHOLESTEROL DIRECT: 88 MG/DL
TOTAL PROTEIN: 6.8 GM/DL (ref 6.4–8.2)
TRIGLYCERIDE, FASTING: 87 MG/DL

## 2020-01-20 PROCEDURE — 36415 COLL VENOUS BLD VENIPUNCTURE: CPT

## 2020-01-20 PROCEDURE — 80061 LIPID PANEL: CPT

## 2020-01-20 PROCEDURE — 80076 HEPATIC FUNCTION PANEL: CPT

## 2020-01-20 RX ORDER — PROPAFENONE HYDROCHLORIDE 150 MG/1
225 TABLET, FILM COATED ORAL EVERY 8 HOURS SCHEDULED
Qty: 135 TABLET | Refills: 0 | Status: SHIPPED | OUTPATIENT
Start: 2020-01-20 | End: 2020-01-22 | Stop reason: SDUPTHER

## 2020-01-20 NOTE — TELEPHONE ENCOUNTER
Patient called she has a question   About her Propafenone quantity  That was called in it doesn't match   How she is to take

## 2020-01-23 RX ORDER — PROPAFENONE HYDROCHLORIDE 150 MG/1
225 TABLET, FILM COATED ORAL EVERY 8 HOURS SCHEDULED
Qty: 405 TABLET | Refills: 0 | Status: SHIPPED | OUTPATIENT
Start: 2020-01-23 | End: 2020-07-29 | Stop reason: SDUPTHER

## 2020-07-29 ENCOUNTER — TELEMEDICINE (OUTPATIENT)
Dept: CARDIOLOGY CLINIC | Age: 55
End: 2020-07-29
Payer: COMMERCIAL

## 2020-07-29 PROCEDURE — 99214 OFFICE O/P EST MOD 30 MIN: CPT | Performed by: INTERNAL MEDICINE

## 2020-07-29 RX ORDER — PROPAFENONE HYDROCHLORIDE 150 MG/1
225 TABLET, FILM COATED ORAL EVERY 8 HOURS SCHEDULED
Qty: 405 TABLET | Refills: 0 | Status: SHIPPED | OUTPATIENT
Start: 2020-07-29 | End: 2020-10-30 | Stop reason: SDUPTHER

## 2020-07-29 NOTE — PROGRESS NOTES
CARDIOLOGY NOTE      7/29/2020    RE: Timothy Arcos  (1965)                               TO:  Dr. Daya Stoner MD            Sayda De La Cruz is a 62 y.o. female who was seen today for management of  htn                                    HPI:   Patient is here for    - Hypertension,is  well controlled, pt is  compliant with medicines  - Hyperlipidimea, lipids are in acceptable range. Pt  is  compliant with medicines  - Atrial fibrillation, pt is  compliant with meds. Patient does not have symptoms from atrial fibrillation                The patient does not have cardiac complaints    Timothy Arcos has the following history recorded in care path:  Patient Active Problem List    Diagnosis Date Noted    Atrial fibrillation with RVR ([facility]      Priority: High    Essential hypertension      Priority: High    Colon polyps 11/15/2018    Anticoagulated 08/08/2018    Left lateral epicondylitis 05/09/2018    Neck pain on left side 04/06/2018    Cervical radiculopathy 04/06/2018    MARLINE treated with BiPAP 03/09/2018    Paroxysmal atrial fibrillation ([facility] 08/10/2017    Dependent edema 07/20/2016    Venous stasis dermatitis of both lower extremities 83/22/0992    Metabolic syndrome 09/14/4106    H/O echocardiogram 07/18/2014    Hyperlipidemia     Obesity, morbid, BMI 40.0-49.9 ([facility])      Current Outpatient Medications   Medication Sig Dispense Refill    rivaroxaban (XARELTO) 20 MG TABS tablet Take 1 tablet by mouth daily (with breakfast) ANTICOAGULANT! Doses greater than 15 mg/day for NONVALVULAR A. FIB must be administered with food.  90 tablet 3    propafenone (RYTHMOL) 150 MG tablet Take 1.5 tablets by mouth every 8 hours 405 tablet 0    fenofibrate (TRICOR) 145 MG tablet Take 1 tablet by mouth daily 90 tablet 3    metoprolol tartrate (LOPRESSOR) 50 MG tablet Take 1 tablet by mouth 2 times daily 180 tablet 3    Compression Stockings MISC by Does not apply route Moderate Compression level 2 each 0    FISH OIL by Does not apply route daily       albuterol sulfate  (90 Base) MCG/ACT inhaler INHALE 2 PUFFS INTO THE LUNGS EVERY 6 HOURS AS NEEDED FOR WHEEZING OR SHORTNESS OF BREATH (Patient not taking: Reported on 7/29/2020) 54 g 0     No current facility-administered medications for this visit. Allergies: Cozaar [losartan potassium]; Lisinopril; and Pcn [penicillins]  Past Medical History:   Diagnosis Date    Atrial fibrillation with RVR ([address] 07/01/2017    Bell palsy     right side of face affected    H/O 24 hour EKG monitoring 3/30/10    holter: other then an episode of sinus tach at 150 bpm no other clinically significant arrhythmia seen. Symptoms reported in pts diary do not correlate with this episode    H/O cardiovascular stress test 1/14/10    Joe protocol: normal stress study. gated study shows uniform wall motion with calculated LV EF of over 67 %    H/O echocardiogram 5/20/10    ECHO:  There is no comparison study available. There is mild concentric LV hypertrophy. LVSF is normal, EF = > 55 %    H/O echocardiogram 07/18/2014    EF55%, borderline left atrial enlargement, normal LV systolic function, mild mitral regurg, no pericardial effusion.     H/O echocardiogram 07/03/2017    EF55%  AV mild  sclerotic    H/O echocardiogram 11/07/2018    EF55-60% sclerotic non stenosed AV    Heart palpitations     History of cardiac monitoring 7/21/14    Event - no clinically significant arrythmias    History of cardiac monitoring 08/10/2017    No clinically signifacant arrythmias    History of complete ECG 5/11/10    HX OTHER MEDICAL     \"not sure if have sleep apnea- have cpap but the last sleep study not conclusive so having another sleep study done at [facility] a cpap but never offically dx with sleep apnea\"    Hyperlipidemia 4/10    Hypertension     follows with Dr El Toney Obesity      Past Surgical History:   Procedure Laterality Date    [facility]    times 2    COLONOSCOPY  2016    COLONOSCOPY N/A 11/15/2018    COLONOSCOPY POLYPECTOMY ABLATION/SNARE/HEMOCLIPS X4 TO PROXIMAL ASCENDING POLYPECTOMY SITE performed by Ti Velázquez MD at [address] [facility], COLON, DIAGNOSTIC  2000    EYE SURGERY  2016    gavino cataract ext    ROTATOR CUFF REPAIR Right 10/2012      As reviewed   Family History   Problem Relation Age of Onset    High Blood Pressure Mother     Cancer Mother         lung ca    Heart Disease Father     Heart Surgery Father         cabg x 2    Other Father         AAA    Diabetes Father     Heart Attack Brother      Social History     Tobacco Use    Smoking status: Former Smoker     Packs/day: 0.50     Years: 30.00     Pack years: 15.00     Types: Cigarettes     Last attempt to quit: 2002     Years since quittin.1    Smokeless tobacco: Never Used   Substance Use Topics    Alcohol use: No     Alcohol/week: 0.0 standard drinks      Review of Systems:    Constitutional: Negative for diaphoresis and fatigue  Psychological:Negative for anxiety or depression  HENT: Negative for headaches, nasal congestion, sinus pain or vertigo  Eyes: Negative for visual disturbance. Endocrine: Negative for polydipsia/polyuria  Respiratory: Negative for shortness of breath  Cardiovascular: Negative for chest pain, dyspnea on exertion, claudication, edema, irregular heartbeat, murmur, palpitations or shortness of breath  Gastrointestinal: Negative for abdominal pain or heartburn  Genito-Urinary: Negative for urinary frequency/urgency  Musculoskeletal: Negative for muscle pain, muscular weakness, negative for pain in arm and leg or swelling in foot and leg  Neurological: Negative for dizziness, headaches, memory loss, numbness/tingling, visual changes, syncope  Dermatological: Negative for rash    Objective: There were no vitals filed for this visit. There were no vitals taken for this visit.     Patient-Reported Vitals 7/29/2020   Patient-Reported Weight 290   Patient-Reported Height 5 5   Patient-Reported Systolic 760   Patient-Reported Diastolic 79        Wt Readings from Last 3 Encounters:   05/28/19 (!) 302 lb 6.4 oz (137.2 kg)   03/12/19 297 lb (134.7 kg)   11/13/18 287 lb (130.2 kg)     There is no height or weight on file to calculate BMI. GENERAL - Alert, oriented, pleasant, in no apparent distress. EYES: No jaundice, no conjunctival pallor. SKIN: It is warm & dry. No rashes. No Echhymosis    HEENT - No clinically significant abnormalities seen. Neck - Supple. Lab Review   Lab Results   Component Value Date    TROPONINT <0.010 07/02/2017     BNP:  No results found for: BNP  PT/INR:    Lab Results   Component Value Date    INR 1.04 11/15/2018     Lab Results   Component Value Date    LABA1C 5.3 06/22/2015     Lab Results   Component Value Date    WBC 8.1 04/10/2018    HCT 40.3 04/10/2018    MCV 88.9 04/10/2018     04/10/2018     Lab Results   Component Value Date    CHOL 173 08/04/2017    TRIG 138 08/04/2017    HDL 42 01/20/2020    LDLCALC 110 (H) 10/26/2016    LDLDIRECT 88 01/20/2020     Lab Results   Component Value Date    ALT 15 01/20/2020    AST 19 01/20/2020     BMP:    Lab Results   Component Value Date     04/10/2018    K 4.1 04/10/2018    CL 95 04/10/2018    CO2 29 04/10/2018    BUN 24 04/10/2018    CREATININE 0.7 04/10/2018     CMP:   Lab Results   Component Value Date     04/10/2018    K 4.1 04/10/2018    CL 95 04/10/2018    CO2 29 04/10/2018    BUN 24 04/10/2018    PROT 6.8 01/20/2020     TSH:    Lab Results   Component Value Date    TSH 3.54 10/26/2016    TSHHS 4.210 07/01/2017         Impression:    No diagnosis found.    Patient Active Problem List   Diagnosis Code    Hyperlipidemia E78.5    Obesity, morbid, BMI 40.0-49.9 [facility] E66.01    H/O echocardiogram S78.81    Metabolic syndrome B04.98    Venous stasis dermatitis of both lower extremities I87.2    Dependent edema R60.9    Atrial fibrillation with RVR (HCC) I48.91    Essential hypertension I10    Paroxysmal atrial fibrillation (HCC) I48.0    MARLINE treated with BiPAP G47.33    Neck pain on left side M54.2    Cervical radiculopathy M54.12    Left lateral epicondylitis M77.12    Anticoagulated Z79.01    Colon polyps K63.5       Assessment & Plan:    -  Hypertension: Patients blood pressure is normal. Patient is advised about low sodium diet. Present medical regimen will not be changed. -  LIPID MANAGEMENT:  Available lipid  lab data reviewed  and patient was given dietary advice. NCEP- ATP III guidelines reviewed with patient. -   Changes  in medicines made: No                                - Atrial fibrillation, pt is  compliant with meds. Patient does not have symptoms from atrial fibrillation      I confirm that this visit was completed in a telehealth setting ,using synchronous audiovisual technology for real time patient interaction . The patient identity with name and date of birth was confirmed . This evaluation of patient was done by telehealth in the setting of ITRRE-24 Public health emergency , which precluded assurance of safe in person visit at the time of service. The patient consented to and accepts potential risks associated with telemedical evaluation and care was taken to assess herman presence of any medical issues that would be more  appropriate for expedited in -person care. Pursuant to the emergency declaration under the [facility] waiver authority and the Medicine in Practice and [employer] Act, this Virtual  Visit was conducted, with patient's consent, to reduce the patient's risk of exposure to COVID-19 and provide continuity of care for an established patient. Services were provided through a video synchronous discussion virtually to substitute for in-person clinic visit.    I Confirm this is a Patient Initiated Episode with an Established Patient who has not had a related appointment within my department in the past 7 days or scheduled within the next 24 hours. The call was not tied to face to face office visit or procedure that has occurred in in prior 7 days.   Based on our conversation /evaluation , a subsequent office visit for the patient's problem is not indicated for  24 hrs      Time spent; 25 m  Location; [facility], [address], [address] [facility]  Office staff shabnam MEREDITH were utilised   melvi Martinez MD    [facility]

## 2020-09-23 RX ORDER — METOPROLOL TARTRATE 50 MG/1
50 TABLET, FILM COATED ORAL 2 TIMES DAILY
Qty: 180 TABLET | Refills: 3 | Status: SHIPPED | OUTPATIENT
Start: 2020-09-23 | End: 2021-09-13

## 2020-10-28 RX ORDER — FENOFIBRATE 145 MG/1
145 TABLET, COATED ORAL DAILY
Qty: 90 TABLET | Refills: 3 | Status: SHIPPED | OUTPATIENT
Start: 2020-10-28 | End: 2021-10-19

## 2020-10-28 NOTE — TELEPHONE ENCOUNTER
Patient calling for 90 day refill of Fenofibrate (tricor) 145mg sent to [facility] [address]. I saw this as a pending request for a refill so I just sent a telephone messge to the nurse. She also wanted to double check how our office had her Propafenone (rythmol) 150mg was written for refills in our office. Told patient our office has it written 1.5 tab every 8 hours and she voiced that this was correct and she was going to inform the pharmacy of which one to delete.

## 2020-11-02 RX ORDER — PROPAFENONE HYDROCHLORIDE 150 MG/1
225 TABLET, FILM COATED ORAL EVERY 8 HOURS SCHEDULED
Qty: 405 TABLET | Refills: 0 | Status: SHIPPED | OUTPATIENT
Start: 2020-11-02 | End: 2021-04-23 | Stop reason: SDUPTHER

## 2021-01-26 ENCOUNTER — TELEPHONE (OUTPATIENT)
Dept: CARDIOLOGY CLINIC | Age: 56
End: 2021-01-26

## 2021-01-26 NOTE — TELEPHONE ENCOUNTER
Patient called she was positive covid 1/6  She is going  appointment with PCP she she   Has a question about getting a steroid shot  With her cardiac medications

## 2021-02-23 DIAGNOSIS — E78.5 HYPERLIPIDEMIA, UNSPECIFIED HYPERLIPIDEMIA TYPE: ICD-10-CM

## 2021-02-23 DIAGNOSIS — I48.91 ATRIAL FIBRILLATION, UNSPECIFIED TYPE (HCC): ICD-10-CM

## 2021-03-02 ENCOUNTER — TELEMEDICINE (OUTPATIENT)
Dept: CARDIOLOGY CLINIC | Age: 56
End: 2021-03-02
Payer: COMMERCIAL

## 2021-03-02 DIAGNOSIS — E78.2 MIXED HYPERLIPIDEMIA: ICD-10-CM

## 2021-03-02 DIAGNOSIS — I48.0 PAROXYSMAL ATRIAL FIBRILLATION (HCC): Primary | ICD-10-CM

## 2021-03-02 DIAGNOSIS — I10 ESSENTIAL HYPERTENSION: ICD-10-CM

## 2021-03-02 PROCEDURE — 99442 PR PHYS/QHP TELEPHONE EVALUATION 11-20 MIN: CPT | Performed by: NURSE PRACTITIONER

## 2021-03-02 ASSESSMENT — ENCOUNTER SYMPTOMS
SHORTNESS OF BREATH: 1
ABDOMINAL DISTENTION: 0
COUGH: 1

## 2021-03-02 NOTE — PROGRESS NOTES
Briana Durán is a 60 y.o. female evaluated via telephone on 3/2/2021. Documentation:  I communicated with the patient and/or health care decision maker about   1. Paroxysmal atrial fibrillation (HCC)    2. Essential hypertension    3. Mixed hyperlipidemia      . Details of this discussion including any medical advice provided:   Santy reports she had COVID in January. She had mild symptoms of shortness of breath and cough. She states she continues with mild shortness of breath and fatigue. She denies chest pain. She denies palpitations. She is compliant with medications. She denies any near syncope or syncope. Vital Signs: (As obtained by patient/caregiver or practitioner observation)    Patient-Reported Vitals 3/2/2021   Patient-Reported Weight 292#   Patient-Reported Height 5'5\"   Patient-Reported Systolic 931   Patient-Reported Diastolic 78       Review of Systems   Respiratory: Positive for cough and shortness of breath. Cardiovascular: Negative for chest pain, palpitations and leg swelling. Gastrointestinal: Negative for abdominal distention. Prior to Visit Medications    Medication Sig Taking? Authorizing Provider   rivaroxaban (XARELTO) 20 MG TABS tablet Take 1 tablet by mouth daily (with breakfast) ANTICOAGULANT! Doses greater than 15 mg/day for NONVALVULAR A. FIB must be administered with food.  Yes Ruddy Dura, APRN - CNP   propafenone (RYTHMOL) 150 MG tablet Take 1.5 tablets by mouth every 8 hours Yes Veena Lea MD   fenofibrate (TRICOR) 145 MG tablet Take 1 tablet by mouth daily Yes Veena Lea MD   metoprolol tartrate (LOPRESSOR) 50 MG tablet Take 1 tablet by mouth 2 times daily Yes Veena Lea MD   albuterol sulfate  (90 Base) MCG/ACT inhaler INHALE 2 PUFFS INTO THE LUNGS EVERY 6 HOURS AS NEEDED FOR WHEEZING OR SHORTNESS OF BREATH Yes Anabelle Harrison MD Compression Stockings MISC by Does not apply route Moderate Compression level Yes Gabe Mcardle, MD   FISH OIL by Does not apply route daily  Yes Historical Provider, MD       Social History     Tobacco Use    Smoking status: Former Smoker     Packs/day: 0.50     Years: 30.00     Pack years: 15.00     Types: Cigarettes     Quit date: 2002     Years since quittin.6    Smokeless tobacco: Never Used   Substance Use Topics    Alcohol use: No     Alcohol/week: 0.0 standard drinks    Drug use: No        Allergies   Allergen Reactions    Cozaar [Losartan Potassium] Other (See Comments)     Cough    Lisinopril Other (See Comments)     Cough    Pcn [Penicillins] Other (See Comments)     Unknown as a child think a rash         1. Paroxysmal atrial fibrillation (HCC)  Denies symptoms from atrial fib  On anticoagulation - recommend to continue   Continue with Rythmol     2. Essential hypertension  Reported blood pressure is stable  Continue with lopressor     3. Mixed hyperlipidemia  No recent lipids  Will mail lab slip   Continue with fish oil and tricor    H/o COVID  Had mild symptoms of shortness of breath  To get Vaccines    - Lipid Panel; Future      Return in about 6 months (around 2021). I Confirm this is a Patient Initiated Episode with an Established Patient who has not had a related appointment within my department in the past 7 days or scheduled within the next 24 hours. The call was not tied to face to face office visit or procedure that has occurred in in prior 7 days.   Based on our conversation /evaluation , a subsequent office visit for the patient's problem is not indicated for  24 hrs     Total Time: minutes: 11-20 minutes    Note: not billable if this call serves to triage the patient into an appointment for the relevant concern

## 2021-03-02 NOTE — LETTER
Timothy Shabazz  1965  [id number]    Have you had any Chest Pain that is not new? - No    Have you had any Shortness of Breath - Yes  If Yes - When on exertion    Have you had any dizziness - No    Have you had any palpitations that are not new? - Yes  If Yes DO EKG - Do you feel your heart skipping  How long does it last - .1  seconds     Is the patient on any of the following medications - Rhythmol (Propafenone)  If Yes DO EKG - Needs done every 6 months    Do you have any edema - swelling in No      Do you have a surgery or procedure scheduled in the near future - No    ? Ask patient if they want to sign up for [employer] if they are not already signed up    ? Check to see if we have an E-MAIL on file for the patient    ? Check medication list thoroughly!!! AND RECONCILE OUTSIDE MEDICATIONS  If dose has changed change the entire order not just the MG  BE SURE TO ASK PATIENT IF THEY NEED MEDICATION REFILLS    ?  At check out add to every patient's \"wrap up\" the following dot phrase AFTERHOURSEDUCATION and ensure we explain this to our patients

## 2021-03-29 ENCOUNTER — HOSPITAL ENCOUNTER (OUTPATIENT)
Dept: WOMENS IMAGING | Age: 56
Discharge: HOME | End: 2021-03-29
Payer: COMMERCIAL

## 2021-03-29 DIAGNOSIS — Z12.31 ENCOUNTER FOR SCREENING MAMMOGRAM FOR MALIGNANT NEOPLASM OF BREAST: ICD-10-CM

## 2021-03-29 LAB
CHOLESTEROL, TOTAL: 155 MG/DL
CHOLESTEROL/HDL RATIO: NORMAL
CHOLESTEROL: 155 MG/DL
FASTING STATUS: ABNORMAL
HDLC SERPL-MCNC: 39 MG/DL
HDLC SERPL-MCNC: 39 MG/DL (ref 35–70)
LDL CHOLESTEROL CALCULATED: 96 MG/DL
LDL CHOLESTEROL CALCULATED: 96 MG/DL (ref 0–160)
NONHDLC SERPL-MCNC: NORMAL MG/DL
TRIGL SERPL-MCNC: 102 MG/DL
TRIGL SERPL-MCNC: 102 MG/DL
VLDLC SERPL CALC-MCNC: 20 MG/DL (ref 4–38)
VLDLC SERPL CALC-MCNC: NORMAL MG/DL

## 2021-03-29 PROCEDURE — 77067 SCR MAMMO BI INCL CAD: CPT

## 2021-04-19 DIAGNOSIS — E78.5 HYPERLIPIDEMIA, UNSPECIFIED HYPERLIPIDEMIA TYPE: ICD-10-CM

## 2021-04-19 DIAGNOSIS — I48.91 ATRIAL FIBRILLATION, UNSPECIFIED TYPE (HCC): ICD-10-CM

## 2021-04-19 RX ORDER — PROPAFENONE HYDROCHLORIDE 150 MG/1
225 TABLET, COATED ORAL EVERY 8 HOURS SCHEDULED
Qty: 405 TABLET | Refills: 0 | Status: CANCELLED | OUTPATIENT
Start: 2021-04-19

## 2021-04-22 DIAGNOSIS — E78.5 HYPERLIPIDEMIA, UNSPECIFIED HYPERLIPIDEMIA TYPE: ICD-10-CM

## 2021-04-22 DIAGNOSIS — I48.91 ATRIAL FIBRILLATION, UNSPECIFIED TYPE (HCC): ICD-10-CM

## 2021-04-23 ENCOUNTER — TELEPHONE (OUTPATIENT)
Dept: CARDIOLOGY CLINIC | Age: 56
End: 2021-04-23

## 2021-04-23 RX ORDER — PROPAFENONE HYDROCHLORIDE 150 MG/1
225 TABLET, FILM COATED ORAL EVERY 8 HOURS SCHEDULED
Qty: 135 TABLET | Refills: 0 | Status: SHIPPED | OUTPATIENT
Start: 2021-04-23 | End: 2021-05-20 | Stop reason: SDUPTHER

## 2021-05-07 ENCOUNTER — NURSE ONLY (OUTPATIENT)
Dept: CARDIOLOGY CLINIC | Age: 56
End: 2021-05-07
Payer: COMMERCIAL

## 2021-05-07 VITALS
BODY MASS INDEX: 57.52 KG/M2 | SYSTOLIC BLOOD PRESSURE: 124 MMHG | WEIGHT: 293 LBS | HEIGHT: 60 IN | HEART RATE: 50 BPM | DIASTOLIC BLOOD PRESSURE: 80 MMHG

## 2021-05-07 DIAGNOSIS — I48.91 ATRIAL FIBRILLATION WITH RVR (HCC): Primary | ICD-10-CM

## 2021-05-07 PROCEDURE — 93000 ELECTROCARDIOGRAM COMPLETE: CPT | Performed by: NURSE PRACTITIONER

## 2021-05-07 NOTE — PROGRESS NOTES
EKG is Sinus bradycardia no acute abnormalities. QTc is 459  Will refill Rythmol for 6 months.    Patient will need EKG every 6 months to continue Rythmol

## 2021-05-19 DIAGNOSIS — E78.5 HYPERLIPIDEMIA, UNSPECIFIED HYPERLIPIDEMIA TYPE: ICD-10-CM

## 2021-05-19 DIAGNOSIS — I48.91 ATRIAL FIBRILLATION, UNSPECIFIED TYPE (HCC): ICD-10-CM

## 2021-05-20 RX ORDER — PROPAFENONE HYDROCHLORIDE 150 MG/1
225 TABLET, FILM COATED ORAL EVERY 8 HOURS SCHEDULED
Qty: 405 TABLET | Refills: 1 | Status: SHIPPED | OUTPATIENT
Start: 2021-05-20 | End: 2022-02-28 | Stop reason: SDUPTHER

## 2021-09-12 RX ORDER — METOPROLOL TARTRATE 50 MG/1
TABLET, FILM COATED ORAL
Qty: 180 TABLET | Refills: 3 | Status: CANCELLED | OUTPATIENT
Start: 2021-09-12

## 2021-09-13 RX ORDER — METOPROLOL TARTRATE 50 MG/1
50 TABLET, FILM COATED ORAL 2 TIMES DAILY
Qty: 180 TABLET | Refills: 3 | Status: SHIPPED | OUTPATIENT
Start: 2021-09-13 | End: 2022-03-09

## 2021-10-19 RX ORDER — FENOFIBRATE 145 MG/1
145 TABLET, COATED ORAL DAILY
Qty: 90 TABLET | Refills: 3 | Status: SHIPPED | OUTPATIENT
Start: 2021-10-19 | End: 2022-10-10

## 2021-11-12 DIAGNOSIS — I48.91 ATRIAL FIBRILLATION, UNSPECIFIED TYPE (HCC): ICD-10-CM

## 2021-11-12 DIAGNOSIS — E78.5 HYPERLIPIDEMIA, UNSPECIFIED HYPERLIPIDEMIA TYPE: ICD-10-CM

## 2021-11-12 RX ORDER — PROPAFENONE HYDROCHLORIDE 150 MG/1
TABLET, FILM COATED ORAL
Qty: 405 TABLET | Refills: 1 | OUTPATIENT
Start: 2021-11-12

## 2022-02-27 DIAGNOSIS — E78.5 HYPERLIPIDEMIA, UNSPECIFIED HYPERLIPIDEMIA TYPE: ICD-10-CM

## 2022-02-27 DIAGNOSIS — I48.91 ATRIAL FIBRILLATION, UNSPECIFIED TYPE (HCC): ICD-10-CM

## 2022-02-28 DIAGNOSIS — E78.5 HYPERLIPIDEMIA, UNSPECIFIED HYPERLIPIDEMIA TYPE: ICD-10-CM

## 2022-02-28 DIAGNOSIS — I48.91 ATRIAL FIBRILLATION, UNSPECIFIED TYPE (HCC): ICD-10-CM

## 2022-02-28 RX ORDER — PROPAFENONE HYDROCHLORIDE 150 MG/1
TABLET, FILM COATED ORAL
Qty: 405 TABLET | Refills: 1 | OUTPATIENT
Start: 2022-02-28

## 2022-02-28 RX ORDER — PROPAFENONE HYDROCHLORIDE 150 MG/1
225 TABLET, FILM COATED ORAL EVERY 8 HOURS SCHEDULED
Qty: 45 TABLET | Refills: 1 | Status: SHIPPED | OUTPATIENT
Start: 2022-02-28 | End: 2022-03-17

## 2022-03-09 ENCOUNTER — NURSE ONLY (OUTPATIENT)
Dept: CARDIOLOGY CLINIC | Age: 57
End: 2022-03-09
Payer: COMMERCIAL

## 2022-03-09 ENCOUNTER — OFFICE VISIT (OUTPATIENT)
Dept: CARDIOLOGY CLINIC | Age: 57
End: 2022-03-09
Payer: COMMERCIAL

## 2022-03-09 VITALS
HEIGHT: 65 IN | HEART RATE: 50 BPM | SYSTOLIC BLOOD PRESSURE: 176 MMHG | OXYGEN SATURATION: 96 % | WEIGHT: 293 LBS | BODY MASS INDEX: 48.82 KG/M2 | DIASTOLIC BLOOD PRESSURE: 80 MMHG

## 2022-03-09 DIAGNOSIS — I38 VHD (VALVULAR HEART DISEASE): ICD-10-CM

## 2022-03-09 DIAGNOSIS — R01.1 MURMUR: ICD-10-CM

## 2022-03-09 DIAGNOSIS — E78.2 MIXED HYPERLIPIDEMIA: ICD-10-CM

## 2022-03-09 DIAGNOSIS — I48.0 PAROXYSMAL ATRIAL FIBRILLATION (HCC): Primary | ICD-10-CM

## 2022-03-09 DIAGNOSIS — I10 ESSENTIAL HYPERTENSION: ICD-10-CM

## 2022-03-09 DIAGNOSIS — I48.0 PAROXYSMAL ATRIAL FIBRILLATION (HCC): ICD-10-CM

## 2022-03-09 PROCEDURE — 93000 ELECTROCARDIOGRAM COMPLETE: CPT | Performed by: NURSE PRACTITIONER

## 2022-03-09 PROCEDURE — 36415 COLL VENOUS BLD VENIPUNCTURE: CPT | Performed by: NURSE PRACTITIONER

## 2022-03-09 PROCEDURE — 99214 OFFICE O/P EST MOD 30 MIN: CPT | Performed by: NURSE PRACTITIONER

## 2022-03-09 ASSESSMENT — ENCOUNTER SYMPTOMS
SHORTNESS OF BREATH: 0
ORTHOPNEA: 0

## 2022-03-09 NOTE — PATIENT INSTRUCTIONS
Please be informed that if you contact our office outside of normal business hours the physician on call cannot help with any scheduling or rescheduling issues, procedure instruction questions or any type of medication issue. We advise you for any urgent/emergency that you go to the nearest emergency room! PLEASE CALL OUR OFFICE DURING NORMAL BUSINESS HOURS    Monday - Friday   8 am to 5 pm    [facility] [address]: [phone]    [place]:  [phone]      **It is YOUR responsibilty to bring medication bottles and/or updated medication list to [address] [place] [address]. This will allow us to better serve you and all your healthcare needs**    Please hold on to these instructions the  will call you within 1-9 business days when we receive authorization from your insurance. Echocardiogram    WHAT TO EXPECT:   ? This test will take approximately 45 minutes. ? It is an ultrasound of the heart. ? It can look at the valves and chambers inside the heart   ? There is no special instructions for this test.     If you are unable to keep this appointment, please notify us 24 hours prior to test at [phone].

## 2022-03-09 NOTE — PROGRESS NOTES
3/9/2022  Primary cardiologist: Dr. Igor Boyd  is an established 60 y.o.  female here for follow-up on   1. Paroxysmal atrial fibrillation (HCC)    2. Essential hypertension    3. Mixed hyperlipidemia    4. VHD (valvular heart disease)    5. Murmur            SUBJECTIVE/OBJECTIVE:    HPI : Simran Lambert is a pleasant 47-year-old female with a history of paroxysmal atrial fibrillation, hypertension, hyperlipidemia, obesity, and chronic anticoagulation use    Simran Lambert reports overall she is feeling well. She denies chest pain or shortness of breath she does note fatigue. Reports her blood pressure at home is been reported as normal.  She is wanting to start a weight loss program and use of weight loss medications    Review of Systems   Constitutional: Positive for malaise/fatigue. Negative for diaphoresis. Cardiovascular: Negative for chest pain, claudication, dyspnea on exertion, irregular heartbeat, leg swelling, near-syncope, orthopnea, palpitations and paroxysmal nocturnal dyspnea. Respiratory: Negative for shortness of breath. Neurological: Negative for dizziness and light-headedness. Vitals:    03/09/22 1016 03/09/22 1025   BP: (!) 180/82 (!) 176/80   Site: Left Upper Arm Left Upper Arm   Position: Sitting Sitting   Cuff Size: Large Adult Large Adult   Pulse: 50    SpO2: 96%    Weight: 300 lb (136.1 kg)    Height: 5' 5\" (1.651 m)      Patient-Reported Vitals 3/2/2021   Patient-Reported Weight 292#   Patient-Reported Height 5'5\"   Patient-Reported Systolic 189   Patient-Reported Diastolic 78     Wt Readings from Last 3 Encounters:   03/09/22 300 lb (136.1 kg)   05/07/21 297 lb (134.7 kg)   05/28/19 (!) 302 lb 6.4 oz (137.2 kg)     Body mass index is 49.92 kg/m². Physical Exam  Vitals reviewed. Constitutional:       Appearance: She is obese. HENT:      Head: Atraumatic. Mouth/Throat:      Mouth: Mucous membranes are moist.   Cardiovascular:      Rate and Rhythm: Normal rate.       Heart sounds: Murmur heard. Systolic murmur is present with a grade of 3/6. Pulmonary:      Effort: Pulmonary effort is normal.      Breath sounds: Normal breath sounds. Abdominal:      General: There is no distension. Tenderness: There is no abdominal tenderness. Musculoskeletal:      Right lower leg: No edema. Left lower leg: No edema. Skin:     General: Skin is warm and dry. Capillary Refill: Capillary refill takes less than 2 seconds. Neurological:      General: No focal deficit present. Mental Status: She is alert. Psychiatric:         Mood and Affect: Mood normal.                Current Outpatient Medications   Medication Sig Dispense Refill    propafenone (RYTHMOL) 150 MG tablet Take 1.5 tablets by mouth every 8 hours 45 tablet 1    XARELTO 20 MG TABS tablet TAKE 1 TABLET BY MOUTH DAILY WITH BREAKFAST 90 tablet 0    fenofibrate (TRICOR) 145 MG tablet TAKE 1 TABLET BY MOUTH DAILY 90 tablet 3    metoprolol tartrate (LOPRESSOR) 50 MG tablet Take 1 tablet by mouth 2 times daily 180 tablet 3    Compression Stockings MISC by Does not apply route Moderate Compression level 2 each 0    FISH OIL by Does not apply route daily       albuterol sulfate  (90 Base) MCG/ACT inhaler INHALE 2 PUFFS INTO THE LUNGS EVERY 6 HOURS AS NEEDED FOR WHEEZING OR SHORTNESS OF BREATH (Patient not taking: Reported on 3/9/2022) 54 g 0     No current facility-administered medications for this visit. All pertinent data reviewed and discussed with patient       ASSESSMENT/PLAN:    1. Paroxysmal atrial fibrillation (HCC)  EKG SB rate 52    msec-continue with Rythmol   We will decrease metoprolol to 25 mg twice a day as she is bradycardic with rate in the 50s and reported rate at home in the 40s. Continue to avoid caffeine. Continue with anticoagulation Xarelto for stroke prevention: Denies recent falls or bleeding    - Basic Metabolic Panel    2.  Essential hypertension  Blood pressure elevated today. Reports home blood pressures in the 130s over 80s. ? Whitecoat syndrome however discussed episodes of anxiety in other places such as store the bank which may also cause blood pressure to be elevated. We discussed goals of blood pressure of 130/80 : will have her return in 2 weeks with a copy of her reported blood pressures from home and adjust medications accordingly. Also advised to avoid sodium    - Basic Metabolic Panel    3. Mixed hyperlipidemia  Results for Emerald Ludwig (MRN [id number])    Ref. Range 3/29/2021 08:12   Cholesterol Latest Ref Range: <200 MG/   HDL Cholesterol Latest Ref Range: >59 MG/DL 39 (L)   LDL Calculated Latest Ref Range: <100 MG/DL 96   Triglycerides Latest Ref Range: <150 MG/   VLDL Latest Ref Range: 4 - 38 MG/DL 20   Of lipids at/near goal.  Continue TriCor fish oil      4. VHD (valvular heart disease)  Systolic murmur grade 3/6 appreciated second intercostal space right sternal border   Check echocardiogram  - ECHO Complete 2D W Doppler W Color; Future    5. Murmur  ? New systolicmurmur  Check echo  - ECHO Complete 2D W Doppler W Color; Future        Medications reviewed and confirmed with patient     Tests ordered:  Echo bmp      Follow-up  2 week bp   6 month OV     Signed:  EVE Taylor CNP, 3/9/2022, 10:45 AM    An electronic signature was used to authenticate this note. Please note this report has been partially produced using speech recognition software and may contain errors related to that system including errors in grammar, punctuation, and spelling, as well as words and phrases that may be inappropriate. If there are any questions or concerns please feel free to contact the dictating provider for clarification.

## 2022-03-09 NOTE — PROGRESS NOTES
Right arm, butterfly, ACV, pt tolerated well. Unable to draw. Left arm, butterfly, MCV, pt tolerated well.

## 2022-03-09 NOTE — LETTER
Elle Lewis [facility]  1965  [id number]    Have you had any Chest Pain that is not new? - No     Have you had any Shortness of Breath - No    Have you had any dizziness - No    Have you had any palpitations that are not new?  - No    Do you have any edema - No      When did you have your last labs drawn 3/29/21    Do you have a surgery or procedure scheduled in the near future - No

## 2022-03-10 LAB
ANION GAP SERPL CALCULATED.3IONS-SCNC: 14 MMOL/L (ref 3–16)
BUN BLDV-MCNC: 21 MG/DL (ref 7–20)
CALCIUM SERPL-MCNC: 9.8 MG/DL (ref 8.3–10.6)
CHLORIDE BLD-SCNC: 102 MMOL/L (ref 99–110)
CO2: 24 MMOL/L (ref 21–32)
CREAT SERPL-MCNC: 0.9 MG/DL (ref 0.6–1.1)
GFR AFRICAN AMERICAN: >60
GFR NON-AFRICAN AMERICAN: >60
GLUCOSE BLD-MCNC: 96 MG/DL (ref 70–99)
POTASSIUM SERPL-SCNC: 4.9 MMOL/L (ref 3.5–5.1)
SODIUM BLD-SCNC: 140 MMOL/L (ref 136–145)

## 2022-03-11 ENCOUNTER — TELEPHONE (OUTPATIENT)
Dept: CARDIOLOGY CLINIC | Age: 57
End: 2022-03-11

## 2022-03-11 NOTE — TELEPHONE ENCOUNTER
----- Message from EVE Harry CNP sent at 3/10/2022  8:33 AM EST -----  Labs look good      Spoke to pt about labs, voiced no concerns. Pt was very pleased.   [address] [place] [address] Dr Vázquez

## 2022-03-17 DIAGNOSIS — E78.5 HYPERLIPIDEMIA, UNSPECIFIED HYPERLIPIDEMIA TYPE: ICD-10-CM

## 2022-03-17 DIAGNOSIS — I48.91 ATRIAL FIBRILLATION, UNSPECIFIED TYPE (HCC): ICD-10-CM

## 2022-03-17 RX ORDER — PROPAFENONE HYDROCHLORIDE 150 MG/1
225 TABLET, FILM COATED ORAL EVERY 8 HOURS
Qty: 135 TABLET | Refills: 5 | Status: SHIPPED | OUTPATIENT
Start: 2022-03-17 | End: 2022-09-12

## 2022-03-23 ENCOUNTER — NURSE ONLY (OUTPATIENT)
Dept: CARDIOLOGY CLINIC | Age: 57
End: 2022-03-23

## 2022-03-23 VITALS
SYSTOLIC BLOOD PRESSURE: 160 MMHG | DIASTOLIC BLOOD PRESSURE: 80 MMHG | OXYGEN SATURATION: 95 % | WEIGHT: 293 LBS | HEART RATE: 49 BPM | HEIGHT: 65 IN | BODY MASS INDEX: 48.82 KG/M2

## 2022-03-23 DIAGNOSIS — E78.5 HYPERLIPIDEMIA, UNSPECIFIED HYPERLIPIDEMIA TYPE: ICD-10-CM

## 2022-03-23 DIAGNOSIS — I48.91 ATRIAL FIBRILLATION, UNSPECIFIED TYPE (HCC): ICD-10-CM

## 2022-03-23 NOTE — PATIENT INSTRUCTIONS
Please be informed that if you contact our office outside of normal business hours the physician on call cannot help with any scheduling or rescheduling issues, procedure instruction questions or any type of medication issue. We advise you for any urgent/emergency that you go to the nearest emergency room! PLEASE CALL OUR OFFICE DURING NORMAL BUSINESS HOURS    Monday - Friday   8 am to 5 pm    [facility] [address]: [phone]    [place]:  [phone]    **It is YOUR responsibilty to bring medication bottles and/or updated medication list to [address] [place] [address]. This will allow us to better serve you and all your healthcare needs**        Please hold on to these instructions the  will call you within 1-9 business days when we receive authorization from your insurance. Echocardiogram    WHAT TO EXPECT:   ? This test will take approximately 45 minutes. ? It is an ultrasound of the heart. ? It can look at the valves and chambers inside the heart   ? There is no special instructions for this test.     If you are unable to keep this appointment, please notify us 24 hours prior to test at [phone].         **EKG AND BLOOD PRESSURE CHECK WHILE YOUR HERE FOR YOUR ECHO**

## 2022-03-23 NOTE — PROGRESS NOTES
Sarah Laboy is here for a 2 week BP check  Last visit her HR was noted to be low: her metoprolol was  to 25 mg bid    She is trending her blood pressure at home average is 150/80 with average HR 50's      BP Readings from Last 3 Encounters:   22 (!) 160/80   22 (!) 176/80   21 124/80     Pulse Readings from Last 3 Encounters:   22 (!) 49   22 50   21 50     Wt Readings from Last 3 Encounters:   22 300 lb (136.1 kg)   22 300 lb (136.1 kg)   21 297 lb (134.7 kg)           Plan:   Sarah Laboy is to decrease Rythmol to 150 mg tid   Repeat EKG and BP in 1-2 weeks-

## 2022-03-30 ENCOUNTER — PROCEDURE VISIT (OUTPATIENT)
Dept: CARDIOLOGY CLINIC | Age: 57
End: 2022-03-30
Payer: COMMERCIAL

## 2022-03-30 DIAGNOSIS — R01.1 MURMUR: ICD-10-CM

## 2022-03-30 DIAGNOSIS — I48.91 ATRIAL FIBRILLATION WITH RVR (HCC): ICD-10-CM

## 2022-03-30 DIAGNOSIS — I38 VHD (VALVULAR HEART DISEASE): ICD-10-CM

## 2022-03-30 LAB
LV EF: 58 %
LVEF MODALITY: NORMAL

## 2022-03-30 PROCEDURE — 93306 TTE W/DOPPLER COMPLETE: CPT | Performed by: INTERNAL MEDICINE

## 2022-03-31 ENCOUNTER — TELEPHONE (OUTPATIENT)
Dept: CARDIOLOGY CLINIC | Age: 57
End: 2022-03-31

## 2022-03-31 NOTE — TELEPHONE ENCOUNTER
----- Message from EVE Aguilera - CNP sent at 3/31/2022  3:21 PM EDT -----  Please phone- EF is good - moderate MR -continue to monitor. Moderate LVH and diastolic dysfunction-need good blood pressure control - how is her shortness of breath      Spoke to pt about echo results, pt stated her SOB is getting better and her blood pressure has been running 150's/70's and heart rate is now 50s and not 40s anymore.    Ivan Franklin

## 2022-04-08 ENCOUNTER — HOSPITAL ENCOUNTER (OUTPATIENT)
Dept: WOMENS IMAGING | Age: 57
Discharge: HOME | End: 2022-04-08
Payer: COMMERCIAL

## 2022-04-08 DIAGNOSIS — Z12.31 VISIT FOR SCREENING MAMMOGRAM: ICD-10-CM

## 2022-04-08 PROCEDURE — 77067 SCR MAMMO BI INCL CAD: CPT

## 2022-05-12 DIAGNOSIS — I48.91 ATRIAL FIBRILLATION, UNSPECIFIED TYPE (HCC): ICD-10-CM

## 2022-05-12 DIAGNOSIS — E78.5 HYPERLIPIDEMIA, UNSPECIFIED HYPERLIPIDEMIA TYPE: ICD-10-CM

## 2022-05-12 RX ORDER — RIVAROXABAN 20 MG/1
TABLET, FILM COATED ORAL
Qty: 90 TABLET | Refills: 1 | Status: SHIPPED | OUTPATIENT
Start: 2022-05-12

## 2022-09-10 DIAGNOSIS — E78.5 HYPERLIPIDEMIA, UNSPECIFIED HYPERLIPIDEMIA TYPE: ICD-10-CM

## 2022-09-10 DIAGNOSIS — I48.91 ATRIAL FIBRILLATION, UNSPECIFIED TYPE (HCC): ICD-10-CM

## 2022-09-12 RX ORDER — PROPAFENONE HYDROCHLORIDE 150 MG/1
TABLET, FILM COATED ORAL
Qty: 135 TABLET | Refills: 5 | Status: SHIPPED | OUTPATIENT
Start: 2022-09-12

## 2022-10-10 RX ORDER — FENOFIBRATE 145 MG/1
145 TABLET, COATED ORAL DAILY
Qty: 90 TABLET | Refills: 3 | Status: SHIPPED | OUTPATIENT
Start: 2022-10-10

## 2022-11-04 ENCOUNTER — OFFICE VISIT (OUTPATIENT)
Dept: CARDIOLOGY CLINIC | Age: 57
End: 2022-11-04
Payer: COMMERCIAL

## 2022-11-04 ENCOUNTER — TELEPHONE (OUTPATIENT)
Dept: CARDIOLOGY CLINIC | Age: 57
End: 2022-11-04

## 2022-11-04 VITALS
HEART RATE: 52 BPM | WEIGHT: 293 LBS | BODY MASS INDEX: 48.82 KG/M2 | SYSTOLIC BLOOD PRESSURE: 144 MMHG | DIASTOLIC BLOOD PRESSURE: 90 MMHG | HEIGHT: 65 IN

## 2022-11-04 DIAGNOSIS — I48.91 ATRIAL FIBRILLATION WITH RVR (HCC): Primary | ICD-10-CM

## 2022-11-04 DIAGNOSIS — E88.81 METABOLIC SYNDROME: ICD-10-CM

## 2022-11-04 DIAGNOSIS — I10 ESSENTIAL HYPERTENSION: ICD-10-CM

## 2022-11-04 DIAGNOSIS — E78.2 MIXED HYPERLIPIDEMIA: ICD-10-CM

## 2022-11-04 DIAGNOSIS — I38 VHD (VALVULAR HEART DISEASE): ICD-10-CM

## 2022-11-04 PROCEDURE — 99214 OFFICE O/P EST MOD 30 MIN: CPT | Performed by: NURSE PRACTITIONER

## 2022-11-04 PROCEDURE — 3074F SYST BP LT 130 MM HG: CPT | Performed by: NURSE PRACTITIONER

## 2022-11-04 PROCEDURE — 3078F DIAST BP <80 MM HG: CPT | Performed by: NURSE PRACTITIONER

## 2022-11-04 PROCEDURE — 93000 ELECTROCARDIOGRAM COMPLETE: CPT | Performed by: NURSE PRACTITIONER

## 2022-11-04 RX ORDER — AMLODIPINE BESYLATE 5 MG/1
5 TABLET ORAL DAILY
Qty: 30 TABLET | Refills: 3 | Status: SHIPPED | OUTPATIENT
Start: 2022-11-04 | End: 2022-11-23 | Stop reason: SINTOL

## 2022-11-04 ASSESSMENT — ENCOUNTER SYMPTOMS
ORTHOPNEA: 0
SHORTNESS OF BREATH: 0

## 2022-11-04 NOTE — PROGRESS NOTES
Atrial Fibrillation CHADSVASC2 Score Stroke Risk:   59 y.o. <65 - 0    female Female - 1   CHF HX: No - 0   HTN HX: Yes - 1   Stroke/TIA/Thromboembolism No - 0   Vascular Disease HX: No - 0   Diabetes Mellitus No - 0   CHADSVASC 2 Score 2      Annual Stroke Risk 2.2%- moderate-high

## 2022-11-04 NOTE — TELEPHONE ENCOUNTER
Patient was here today and added a bp med, she wants to know if she is to continue taking the metoprolol.   Please call

## 2022-11-04 NOTE — PROGRESS NOTES
11/4/2022  Primary cardiologist: Dr. Francy Rojasess:   Kamaljit Smith  is an established 59 y.o.  female here for a 6 month follow up on   1. Atrial fibrillation with RVR ([address]    2. Metabolic syndrome    3. Essential hypertension    4. Mixed hyperlipidemia    5. VHD (valvular heart disease)            SUBJECTIVE/OBJECTIVE:  Kamaljit Smith is a 59 y.o. female with a history of paroxysmal atrial fibrillation, VHD hypertension, hyperlipidemia, obesity, and chronic anticoagulation use      HPI :   Kamaljit Smith reports overall she is feeling fairly well. She reports she is having difficult time with weight loss. She is trying to exercise with her  walking however states she is not able to walk far due to fatigue. She denies episodes of chest pain or shortness of breath. Review of Systems   Constitutional: Negative for diaphoresis and malaise/fatigue. Cardiovascular:  Negative for chest pain, claudication, dyspnea on exertion, irregular heartbeat, leg swelling, near-syncope, orthopnea, palpitations and paroxysmal nocturnal dyspnea. Respiratory:  Negative for shortness of breath. Neurological:  Negative for dizziness and light-headedness. Vitals:    11/04/22 1058 11/04/22 1108   BP: (!) 144/100 (!) 144/90   Pulse: 52    Weight: (!) 303 lb (137.4 kg)    Height: 5' 5\" (1.651 m)      Patient-Reported Vitals 3/2/2021   Patient-Reported Weight 292#   Patient-Reported Height 5'5\"   Patient-Reported Systolic 180   Patient-Reported Diastolic 78     Wt Readings from Last 3 Encounters:   11/04/22 (!) 303 lb (137.4 kg)   03/23/22 300 lb (136.1 kg)   03/09/22 300 lb (136.1 kg)     Body mass index is 50.42 kg/m². Physical Exam  Vitals reviewed. Constitutional:       Appearance: She is obese. HENT:      Head: Normocephalic. Mouth/Throat:      Mouth: Mucous membranes are moist.   Eyes:      Pupils: Pupils are equal, round, and reactive to light. Neck:      Vascular: No carotid bruit.    Cardiovascular:      Rate and Rhythm: Regular rhythm. Bradycardia present. Pulses: Normal pulses. Heart sounds: Murmur (systolic murmur 2/6) heard. Pulmonary:      Effort: Pulmonary effort is normal.      Breath sounds: Normal breath sounds. No rales. Chest:      Chest wall: No tenderness. Abdominal:      General: There is no distension. Palpations: Abdomen is soft. Tenderness: There is no abdominal tenderness. Musculoskeletal:      Cervical back: No tenderness. Right lower leg: No edema. Left lower leg: No edema. Skin:     General: Skin is warm and dry. Capillary Refill: Capillary refill takes less than 2 seconds. Neurological:      Mental Status: She is alert and oriented to person, place, and time. Psychiatric:         Mood and Affect: Mood normal.         Behavior: Behavior normal.              Current Outpatient Medications   Medication Sig Dispense Refill    metoprolol tartrate (LOPRESSOR) 25 MG tablet Take 1 tablet by mouth 2 times daily 60 tablet 5    fenofibrate (TRICOR) 145 MG tablet Take 1 tablet by mouth daily 90 tablet 3    propafenone (RYTHMOL) 150 MG tablet TAKE 1 AND 1/2 TABLETS BY MOUTH EVERY 8 HOURS 135 tablet 5    XARELTO 20 MG TABS tablet TAKE 1 TABLET BY MOUTH DAILY WITH BREAKFAST 90 tablet 1    Compression Stockings MISC by Does not apply route Moderate Compression level 2 each 0    FISH OIL by Does not apply route daily        No current facility-administered medications for this visit. All pertinent data reviewed and discussed with patient  March 2022 echocardiogram   Limited study due to patients body habitus. Left ventricular function and size is normal, EF is estimated at 55-60%. Moderate left ventricular hypertrophy. Grade II diastolic dysfunction. No regional wall motion abnormalities were detected. Bi atrial enlargement noted. Sclerotic, but non-stenotic aortic valve. Mitral annular calcification is present.    Mild to Moderate mitral and moderate tricuspid regurgitation is present. Mild pulmonary hypertension is noted with RVSp of 46mmHg. No evidence of pericardial effusion. ASSESSMENT/PLAN:    PAF  EKG today sinus bradycardia rate 54 : ms: Poor wave R progression in anterior leads: Continue with Rythmol for rhythm control  When compared to previous EKG no significant changes. Remains asymptomatic. Continue with Xarelto; denies falls, hematuria, melena or hematochezia. Morbid obesity  BMI 50.4 to  She is requesting assistance with weight loss. She is not interested in gastric band or sleeve will would prefer to do diet/exercise along with possible medical management: Will refer to weight management    Hypertension  Blood pressure continues to be elevated. We will add amlodipine 5 mg once a day and return to clinic for blood pressure check and further adjustments of medications. Of note she does not tolerate ACE or ARB due to cough  She has moderate LVH with grade 2 diastolic dysfunction noted on echocardiogram.  There is no wall motion abnormalities. Discussed importance of getting blood pressure at goal of 130/80 which may take titration of medications and additional meds added to achieve goal      Mixed hyperlipidemia  Lipids noted from March 2021  HDL 39-low :  LDL 96    Metabolic syndrome  Meets criteria for metabolic syndrome with 4 of 5 measures met:  abdominal obesity, high blood pressure,  low HDL cholesterol level  Unfortunately do not have triglyceride level that is recent however currently on Tricor and fish which is suggestive of hypertriglyceridemia which in that case would make her 4 out of 5 measures meant for metabolic syndrome. Encouraged diet/weight loss : referral to weight management. Continue with the walking program which should be overly 70 minutes/week  Continue with Tricor and fish oil      Valvular heart disease  Grade 2/6 systolic murmur appreciated.   Echocardiogram shows mild to moderate MR and moderate TR. We will continue the ongoing surveillance. Tests ordered: None  Follow-up 2 weeks for blood pressure check      Signed:  EVE Araiza CNP, 11/4/2022, 11:17 AM    An electronic signature was used to authenticate this note. Please note this report has been partially produced using speech recognition software and may contain errors related to that system including errors in grammar, punctuation, and spelling, as well as words and phrases that may be inappropriate. If there are any questions or concerns please feel free to contact the dictating provider for clarification.

## 2022-11-09 DIAGNOSIS — E78.5 HYPERLIPIDEMIA, UNSPECIFIED HYPERLIPIDEMIA TYPE: ICD-10-CM

## 2022-11-09 DIAGNOSIS — I48.91 ATRIAL FIBRILLATION, UNSPECIFIED TYPE (HCC): ICD-10-CM

## 2022-11-10 ENCOUNTER — TELEPHONE (OUTPATIENT)
Dept: CARDIOLOGY CLINIC | Age: 57
End: 2022-11-10

## 2022-11-10 NOTE — TELEPHONE ENCOUNTER
Patient started amlodipine 11/4, has had headache and lethargic for past 3 days. Systolic [phone]. Did not know pulse but in 50's when in office.

## 2022-11-23 ENCOUNTER — NURSE ONLY (OUTPATIENT)
Dept: CARDIOLOGY CLINIC | Age: 57
End: 2022-11-23

## 2022-11-23 VITALS
DIASTOLIC BLOOD PRESSURE: 86 MMHG | HEART RATE: 59 BPM | OXYGEN SATURATION: 91 % | BODY MASS INDEX: 48.82 KG/M2 | WEIGHT: 293 LBS | SYSTOLIC BLOOD PRESSURE: 132 MMHG | HEIGHT: 65 IN

## 2022-11-23 DIAGNOSIS — I10 ESSENTIAL HYPERTENSION: Primary | ICD-10-CM

## 2022-11-23 NOTE — PATIENT INSTRUCTIONS
Thank you for allowing us to care for you today! We want to ensure we can follow your treatment plan and we strive to give you the best outcomes and experience possible. If you ever have a life threatening emergency and call 911 - for an ambulance (EMS)   Our providers can only care for you at:   [facility] or [facility]. Even if you have someone take you or you drive yourself we can only care for you in a [facility]. Our providers are not setup at the other healthcare locations! Please be informed that if you contact our office outside of normal business hours the physician on call cannot help with any scheduling or rescheduling issues, procedure instruction questions or any type of medication issue. We advise you for any urgent/emergency that you go to the nearest emergency room! PLEASE CALL OUR OFFICE DURING NORMAL BUSINESS HOURS    Monday - Friday   8 am to 5 pm    [place]: [phone]    [place]: [phone]    [place]:  [phone]**It is YOUR responsibilty to bring medication bottles and/or updated medication list to [address] [place] [address]. This will allow us to better serve you and all your healthcare needs**  [facility] Locations - No appointment necessary. Sites open Monday to Friday. Call your preferred location for test preparation, business hours and other information you need. [employer] accepts [employer]. [facility] [address] Lina Baxter, [address] [facility]  Phone: [phone] [facility] [address] [facility]  [address].   Ramya De León, [address] [facility]  Phone: [phone]

## 2022-11-23 NOTE — PROGRESS NOTES
Scar Ackerman is here for a 2 week BP check  Last visit she was started on amlodipine for elevated blood pressure. Unfortunately she did not tolerate the amlodipine : Had symptoms of for 3 days. Since stopping amlodipine she feels much better. BP Readings from Last 3 Encounters:   11/23/22 132/86   11/04/22 (!) 144/90   03/23/22 (!) 160/80     Pulse Readings from Last 3 Encounters:   11/23/22 59   11/04/22 52   03/23/22 (!) 49     Wt Readings from Last 3 Encounters:   11/23/22 (!) 302 lb (137 kg)   11/04/22 (!) 303 lb (137.4 kg)   03/23/22 300 lb (136.1 kg)           Plan:   Scar Ackerman is to continue present medications.   Advised to follow low-sodium diet and weight loss which can help with controlling her blood pressure as well

## 2022-12-08 ENCOUNTER — APPOINTMENT (OUTPATIENT)
Dept: GENERAL RADIOLOGY | Age: 57
End: 2022-12-08
Payer: COMMERCIAL

## 2022-12-08 ENCOUNTER — TELEPHONE (OUTPATIENT)
Dept: CARDIOLOGY CLINIC | Age: 57
End: 2022-12-08

## 2022-12-08 ENCOUNTER — HOSPITAL ENCOUNTER (EMERGENCY)
Age: 57
Discharge: HOME | End: 2022-12-08
Attending: EMERGENCY MEDICINE
Payer: COMMERCIAL

## 2022-12-08 VITALS
RESPIRATION RATE: 22 BRPM | SYSTOLIC BLOOD PRESSURE: 159 MMHG | DIASTOLIC BLOOD PRESSURE: 109 MMHG | TEMPERATURE: 98.3 F | HEART RATE: 81 BPM | OXYGEN SATURATION: 96 %

## 2022-12-08 DIAGNOSIS — R00.2 PALPITATIONS: Primary | ICD-10-CM

## 2022-12-08 DIAGNOSIS — I48.91 ATRIAL FIBRILLATION WITH RAPID VENTRICULAR RESPONSE (HCC): ICD-10-CM

## 2022-12-08 LAB
ANION GAP SERPL CALCULATED.3IONS-SCNC: 12 MMOL/L (ref 4–16)
BASOPHILS ABSOLUTE: 0 K/CU MM
BASOPHILS RELATIVE PERCENT: 0.6 % (ref 0–1)
BUN BLDV-MCNC: 19 MG/DL (ref 6–23)
CALCIUM SERPL-MCNC: 9.4 MG/DL (ref 8.3–10.6)
CHLORIDE BLD-SCNC: 103 MMOL/L (ref 99–110)
CO2: 25 MMOL/L (ref 21–32)
CREAT SERPL-MCNC: 0.7 MG/DL (ref 0.6–1.1)
DIFFERENTIAL TYPE: ABNORMAL
EKG ATRIAL RATE: 105 BPM
EKG DIAGNOSIS: NORMAL
EKG Q-T INTERVAL: 326 MS
EKG QRS DURATION: 102 MS
EKG QTC CALCULATION (BAZETT): 499 MS
EKG R AXIS: -28 DEGREES
EKG T AXIS: 98 DEGREES
EKG VENTRICULAR RATE: 141 BPM
EOSINOPHILS ABSOLUTE: 0.1 K/CU MM
EOSINOPHILS RELATIVE PERCENT: 1.5 % (ref 0–3)
GFR SERPL CREATININE-BSD FRML MDRD: >60 ML/MIN/1.73M2
GLUCOSE BLD-MCNC: 142 MG/DL (ref 70–99)
HCT VFR BLD CALC: 48.1 % (ref 37–47)
HEMOGLOBIN: 16.2 GM/DL (ref 12.5–16)
IMMATURE NEUTROPHIL %: 0.2 % (ref 0–0.43)
LYMPHOCYTES ABSOLUTE: 2.4 K/CU MM
LYMPHOCYTES RELATIVE PERCENT: 35.9 % (ref 24–44)
MAGNESIUM: 2.6 MG/DL (ref 1.8–2.4)
MCH RBC QN AUTO: 29.4 PG (ref 27–31)
MCHC RBC AUTO-ENTMCNC: 33.7 % (ref 32–36)
MCV RBC AUTO: 87.3 FL (ref 78–100)
MONOCYTES ABSOLUTE: 0.6 K/CU MM
MONOCYTES RELATIVE PERCENT: 8.7 % (ref 0–4)
NUCLEATED RBC %: 0 %
PDW BLD-RTO: 12.8 % (ref 11.7–14.9)
PLATELET # BLD: 266 K/CU MM (ref 140–440)
PMV BLD AUTO: 9.7 FL (ref 7.5–11.1)
POTASSIUM SERPL-SCNC: 3.9 MMOL/L (ref 3.5–5.1)
RBC # BLD: 5.51 M/CU MM (ref 4.2–5.4)
SEGMENTED NEUTROPHILS ABSOLUTE COUNT: 3.5 K/CU MM
SEGMENTED NEUTROPHILS RELATIVE PERCENT: 53.1 % (ref 36–66)
SODIUM BLD-SCNC: 140 MMOL/L (ref 135–145)
TOTAL IMMATURE NEUTOROPHIL: 0.01 K/CU MM
TOTAL NUCLEATED RBC: 0 K/CU MM
TROPONIN T: <0.01 NG/ML
WBC # BLD: 6.6 K/CU MM (ref 4–10.5)

## 2022-12-08 PROCEDURE — 99285 EMERGENCY DEPT VISIT HI MDM: CPT | Performed by: EMERGENCY MEDICINE

## 2022-12-08 PROCEDURE — 80048 BASIC METABOLIC PNL TOTAL CA: CPT

## 2022-12-08 PROCEDURE — 85025 COMPLETE CBC W/AUTO DIFF WBC: CPT

## 2022-12-08 PROCEDURE — 71045 X-RAY EXAM CHEST 1 VIEW: CPT

## 2022-12-08 PROCEDURE — 93005 ELECTROCARDIOGRAM TRACING: CPT | Performed by: EMERGENCY MEDICINE

## 2022-12-08 PROCEDURE — 93010 ELECTROCARDIOGRAM REPORT: CPT | Performed by: INTERNAL MEDICINE

## 2022-12-08 PROCEDURE — 96374 THER/PROPH/DIAG INJ IV PUSH: CPT

## 2022-12-08 PROCEDURE — 6360000002 HC RX W HCPCS: Performed by: EMERGENCY MEDICINE

## 2022-12-08 PROCEDURE — 83735 ASSAY OF MAGNESIUM: CPT

## 2022-12-08 PROCEDURE — 96376 TX/PRO/DX INJ SAME DRUG ADON: CPT

## 2022-12-08 PROCEDURE — 84484 ASSAY OF TROPONIN QUANT: CPT

## 2022-12-08 PROCEDURE — 6370000000 HC RX 637 (ALT 250 FOR IP): Performed by: EMERGENCY MEDICINE

## 2022-12-08 PROCEDURE — 2500000003 HC RX 250 WO HCPCS: Performed by: EMERGENCY MEDICINE

## 2022-12-08 RX ORDER — DILTIAZEM HYDROCHLORIDE 5 MG/ML
25 INJECTION INTRAVENOUS ONCE
Status: COMPLETED | OUTPATIENT
Start: 2022-12-08 | End: 2022-12-08

## 2022-12-08 RX ORDER — MAGNESIUM SULFATE IN WATER 40 MG/ML
2000 INJECTION, SOLUTION INTRAVENOUS ONCE
Status: COMPLETED | OUTPATIENT
Start: 2022-12-08 | End: 2022-12-08

## 2022-12-08 RX ORDER — DILTIAZEM HYDROCHLORIDE 5 MG/ML
20 INJECTION INTRAVENOUS ONCE
Status: COMPLETED | OUTPATIENT
Start: 2022-12-08 | End: 2022-12-08

## 2022-12-08 RX ORDER — METOPROLOL TARTRATE 50 MG/1
25 TABLET, FILM COATED ORAL ONCE
Status: COMPLETED | OUTPATIENT
Start: 2022-12-08 | End: 2022-12-08

## 2022-12-08 RX ADMIN — DILTIAZEM HYDROCHLORIDE 25 MG: 5 INJECTION INTRAVENOUS at 04:54

## 2022-12-08 RX ADMIN — DILTIAZEM HYDROCHLORIDE 20 MG: 5 INJECTION INTRAVENOUS at 06:16

## 2022-12-08 RX ADMIN — METOPROLOL TARTRATE 25 MG: 50 TABLET, FILM COATED ORAL at 06:06

## 2022-12-08 RX ADMIN — MAGNESIUM SULFATE HEPTAHYDRATE 2000 MG: 2 INJECTION, SOLUTION INTRAVENOUS at 05:04

## 2022-12-08 NOTE — TELEPHONE ENCOUNTER
Patient states metoprolol and rythmol lowered. Since she has had no increase in HR, increase in palpitations and last night went to ED with afib RVR.  Patient asking if meds should be adjusted

## 2022-12-08 NOTE — TELEPHONE ENCOUNTER
Patient was in ER 12/8/22. She is asking about her medications and possible changes. ER gave her metoprolol while she was there . She states they told her she has AFIB . She took her propafenone when she got home from ER. Please call to advise. She has an appt scheduled for 12/23/22 for a follow up to the ER visit. Patient is currently stable .

## 2022-12-08 NOTE — ED PROVIDER NOTES
CHIEF COMPLAINT    Chief Complaint   Patient presents with    Palpitations     High heart rate, hx of afib      HPI  Teresa Gonzales is a 59 y.o. female with history of hypertension, hyperlipidemia, atrial fibrillation, chronic anticoagulation who presents to the ED with complaints of palpitations. Patient states she awakened earlier this evening to use the restroom and subsequently began to experience palpitations with a sensation of her heart was racing. She states that after she had the sensation of her heart racing for an hour she began to have a burning sensation in her left chest.  She continues to have palpitations and a mild burning sensation on left chest without radiation. She did have some intermittent lightheadedness earlier as well which has resolved. Symptoms are constant. She has known history of atrial fibrillation and is prescribed propafenone as well as metoprolol. Patient has been compliant with her Xarelto therapy. Denies fevers, chills, abdominal pain, nausea, vomiting. REVIEW OF SYSTEMS  Constitutional: No fever, chills   Eye: No visual changes  HENT: No earache or sore throat. Resp: No SOB or productive cough. Cardio: Complains of chest pain and palpitations  GI: No abdominal pain, nausea, vomiting, constipation or diarrhea. No melena. : No dysuria, urgency or frequency. Endocrine: No heat intolerance, no cold intolerance, no polydipsia   Lymphatics: No adenopathy  Musculoskeletal: No new muscle aches or joint pain. Neuro: No headaches. Psych: No homicidal or suicidal thoughts  Skin: No rash, No itching. ?  ? PAST MEDICAL HISTORY  Past Medical History:   Diagnosis Date    Atrial fibrillation with RVR ([facility] [address] 07/01/2017    Bell palsy     right side of face affected    H/O 24 hour EKG monitoring 3/30/10    holter: other then an episode of sinus tach at 150 bpm no other clinically significant arrhythmia seen.   Symptoms reported in pts diary do not correlate with this episode H/O cardiovascular stress test 1/14/10    Joe protocol: normal stress study. gated study shows uniform wall motion with calculated LV EF of over 67 %    H/O echocardiogram 5/20/10    ECHO:  There is no comparison study available. There is mild concentric LV hypertrophy. LVSF is normal, EF = > 55 %    H/O echocardiogram 2014    EF55%, borderline left atrial enlargement, normal LV systolic function, mild mitral regurg, no pericardial effusion. H/O echocardiogram 2017    EF55%  AV mild  sclerotic    H/O echocardiogram 2018    EF55-60% sclerotic non stenosed AV    Heart palpitations     History of cardiac monitoring 14    Event - no clinically significant arrythmias    History of cardiac monitoring 08/10/2017    No clinically signifacant arrythmias    History of complete ECG 5/11/10    HX OTHER MEDICAL     \"not sure if have sleep apnea- have cpap but the last sleep study not conclusive so having another sleep study done at [facility] a cpap but never offically dx with sleep apnea\"    Hyperlipidemia 4/10    Hypertension     follows with Dr Shane Sodus History   Problem Relation Age of Onset    High Blood Pressure Mother     Cancer Mother         lung ca    Heart Disease Father     Heart Surgery Father         cabg x 2    Other Father         AAA    Diabetes Father     Heart Attack Brother     Breast Cancer Neg Hx      SOCIAL HISTORY  Social History     Socioeconomic History    Marital status:      Spouse name: None    Number of children: 2    Years of education: None    Highest education level: None   Occupational History    Occupation: full time   Tobacco Use    Smoking status: Former     Packs/day: 0.50     Years: 30.00     Pack years: 15.00     Types: Cigarettes     Quit date: 2002     Years since quittin.4    Smokeless tobacco: Never   Vaping Use    Vaping Use: Never used   Substance and Sexual Activity    Alcohol use:  No Comment: NO CAFFEINE    Drug use: No    Sexual activity: Yes     Partners: Male       SURGICAL HISTORY  Past Surgical History:   Procedure Laterality Date    [address]    times 2    COLONOSCOPY  2016    COLONOSCOPY N/A 11/15/2018    COLONOSCOPY POLYPECTOMY ABLATION/SNARE/HEMOCLIPS X4 TO PROXIMAL ASCENDING POLYPECTOMY SITE performed by Chi Mota MD at [facility], COLON, DIAGNOSTIC  2000    EYE SURGERY  2016    gavino cataract ext    ROTATOR CUFF REPAIR Right 10/2012     CURRENT MEDICATIONS  Previous Medications    COMPRESSION STOCKINGS MISC    by Does not apply route Moderate Compression level    FENOFIBRATE (TRICOR) 145 MG TABLET    Take 1 tablet by mouth daily    FISH OIL    by Does not apply route daily     METOPROLOL TARTRATE (LOPRESSOR) 25 MG TABLET    Take 1 tablet by mouth 2 times daily    PROPAFENONE (RYTHMOL) 150 MG TABLET    TAKE 1 AND 1/2 TABLETS BY MOUTH EVERY 8 HOURS    RIVAROXABAN (XARELTO) 20 MG TABS TABLET    Take 1 tablet by mouth daily (with breakfast)     ALLERGIES  Allergies   Allergen Reactions    Cozaar [Losartan Potassium] Other (See Comments)     Cough    Lisinopril Other (See Comments)     Cough    Pcn [Penicillins] Other (See Comments)     Unknown as a child think a rash       Nursing notes reviewed by myself for past medical history, family history, social history, surgical history, current medications, and allergies. PHYSICAL EXAM  VITAL SIGNS: Triage VS:    ED Triage Vitals   Enc Vitals Group      BP       Pulse       Resp       Temp       Temp src       SpO2       Weight       Height       Head Circumference       Peak Flow       Pain Score       Pain Loc       Pain Edu? Excl. in [address]? Constitutional: Well developed, Well nourished, nontoxic appearing  HENT: Normocephalic, Atraumatic, Bilateral external ears normal, Oropharynx moist, No oral exudates, Nose normal.   Eyes: PERRL, EOMI, Conjunctiva normal, No discharge.  No scleral injection 20 mg (20 mg IntraVENous Given 12/8/22 0616)     [address] [place] [address] Drive MAKING    56-year-old female with history of atrial fibrillation presents emergency department complaints of palpitations and some burning in the chest that started overnight. Initial vital signs demonstrate elevated blood pressure of 174/105 and tachycardia heart rate of 142. She is saturating 97% room air. On exam she is nontoxic-appearing. She does have tachycardia with irregular regular rhythm. EKG obtained demonstrates atrial fibrillation with rapid ventricular response. At this time we will obtain CBC, BMP, troponin, magnesium level and in the interim provide the patient with IV magnesium as well as Cardizem bolus. BMP is reassuring. Troponin is nonelevated. Following Cardizem bolus the patient achieved rate control. She did have a increase in rate while laboratory shows her pending and we did provide her with her home dose metoprolol and a second Cardizem bolus. She has remained rate controlled with heart rate in the 80s to 90s now. At this time I feel she is appropriate for discharge home. Instructed to take her normal morning dose of propafenone. We also discussed calling cardiology office today for follow-up appointment. Return precautions provided. Critical Care Time: I have personally provided 50 minutes of critical care time (excluding separately listed billable procedures) through obtaining a history, examining the patient, reviewing relevant medical records, discussing care with family and/or other providers, performing multiple reassessments and directing care. Amount and/or Complexity of Data Reviewed  Clinical lab tests: reviewed  Decide to obtain previous medical records or to obtain history from someone other than the patient: yes       -  Patient seen and evaluated in the emergency department. -  Triage and nursing notes reviewed and incorporated.   -  Old chart records reviewed and incorporated. -  Work-up included:  See above  -  Results discussed with patient. Appropriate PPE utilized as indicated for entire patient encounter? Time of Disposition: See timeline  ? New Prescriptions    No medications on file     FINAL IMPRESSION  1. Palpitations    2. Atrial fibrillation with rapid ventricular response [facility])        Electronically signed by:  Rizwana Tucker DO, 12/8/2022         Rizwana Tucker DO  12/08/22 5586

## 2022-12-12 ENCOUNTER — TELEPHONE (OUTPATIENT)
Dept: CARDIOLOGY CLINIC | Age: 57
End: 2022-12-12

## 2022-12-12 ENCOUNTER — OFFICE VISIT (OUTPATIENT)
Dept: CARDIOLOGY CLINIC | Age: 57
End: 2022-12-12
Payer: COMMERCIAL

## 2022-12-12 ENCOUNTER — HOSPITAL ENCOUNTER (OUTPATIENT)
Age: 57
Setting detail: SPECIMEN
Discharge: HOME | End: 2022-12-12
Payer: COMMERCIAL

## 2022-12-12 VITALS
HEART RATE: 88 BPM | BODY MASS INDEX: 48.82 KG/M2 | HEIGHT: 65 IN | WEIGHT: 293 LBS | SYSTOLIC BLOOD PRESSURE: 134 MMHG | DIASTOLIC BLOOD PRESSURE: 82 MMHG

## 2022-12-12 DIAGNOSIS — Z01.810 PRE-OPERATIVE CARDIOVASCULAR EXAMINATION: Primary | ICD-10-CM

## 2022-12-12 DIAGNOSIS — I48.91 ATRIAL FIBRILLATION WITH RVR (HCC): Primary | ICD-10-CM

## 2022-12-12 PROCEDURE — 3078F DIAST BP <80 MM HG: CPT | Performed by: INTERNAL MEDICINE

## 2022-12-12 PROCEDURE — U0005 INFEC AGEN DETEC AMPLI PROBE: HCPCS

## 2022-12-12 PROCEDURE — 99214 OFFICE O/P EST MOD 30 MIN: CPT | Performed by: INTERNAL MEDICINE

## 2022-12-12 PROCEDURE — U0003 INFECTIOUS AGENT DETECTION BY NUCLEIC ACID (DNA OR RNA); SEVERE ACUTE RESPIRATORY SYNDROME CORONAVIRUS 2 (SARS-COV-2) (CORONAVIRUS DISEASE [COVID-19]), AMPLIFIED PROBE TECHNIQUE, MAKING USE OF HIGH THROUGHPUT TECHNOLOGIES AS DESCRIBED BY CMS-2020-01-R: HCPCS

## 2022-12-12 PROCEDURE — 3074F SYST BP LT 130 MM HG: CPT | Performed by: INTERNAL MEDICINE

## 2022-12-12 PROCEDURE — 93000 ELECTROCARDIOGRAM COMPLETE: CPT | Performed by: INTERNAL MEDICINE

## 2022-12-12 NOTE — TELEPHONE ENCOUNTER
Patient has been having heaviness, burning in chest, no good sleep for 2 wks. Also some sob. Ellen Perales is aware of her problems.   Please call

## 2022-12-12 NOTE — LETTER
Katy Dumont     Transesophageal Echocardiogram with Cardioversion    Patient Name: Willetta Angelucci   : 1965   MRN# [id number]     Date of Procedure: 22 Time: 12:00 Arrival Time: 11:00   (Arrival time is scheduled for one (1) hour before procedure is scheduled.)     Hospital: [facility])     Call to Pre-[place] at [phone] two (2) days before your procedure    Please have blood work done 1 to 2 days before procedure at [facility]. X Please do not have anything by mouth after midnight prior to or 8 hours before the procedure. X You may take your medication with a sip of water unless advised otherwise below. X Please continue to take Xarelto (rivaroxaban) as directed. X After your labwork and Covid test are done, you will need to Quarantine until your procedure. Patient Signature:_______________________________________________________           Staff Signature: _________________________________________________________                                        [employer]    What is cardioversion? Cardioversion helps your heart return to a normal rhythm. It treats problems like atrial fibrillation. It is also sometimes used in emergencies. It can correct a fast heartbeat that causes low blood pressure, chest pain, or heart failure. Your doctor may ask you to take medicines before the treatment. These help prevent blood clots. Your doctor will watch you closely to make sure that there are no problems. Electrical cardioversion: The electrical procedure is done in a hospital. You will get medicine to help you relax and control the pain. Your doctor will put paddles or patches on your chest and back. These send an electric current to your heart. This resets your heart rhythm. The electrical part takes about 5 minutes.  But you will probably be in the hospital for 1 to 2 hours. You will need to recover from the effects of the pain medicine. Chemical cardioversion: The chemical procedure is most often done in a hospital. In most cases, the medicine is put into your arm through a tube called an IV. But you may get medicines to take by mouth. You may feel a quick sting or pinch when the IV starts. The procedure usually takes about 30 to 45 minutes. Transesophageal Echocardiogram    What is a transesophageal echocardiogram?  A transesophageal echocardiogram is a test to help your doctor look at the inside of your heart. A small device called a transducer directs sound waves toward your heart. The sound waves make a picture of the heart's valves and chambers. Your doctor may do this test to look for certain types of heart disease. Or it may be done to see how disease is affecting your heart. You will be given medicine to make you sleepy and comfortable during the test. The doctor puts a small, flexible tube into your throat and guides it to the esophagus. This is the tube that connects your mouth to your stomach. The doctor will ask you to swallow as the tube goes down. The transducer is at the tip of the tube. It gets close to your heart to make clear pictures. The doctor will look at the ultrasound pictures on a screen. You will not be able to eat or drink until the numbness from the throat spray wears off. Your throat may be sore for a few days after the test.  Follow-up care is a key part of your treatment and safety. Be sure to make and go to all appointments, and call your doctor if you are having problems. It's also a good idea to know your test results and keep a list of the medicines you take.                                     [facility]     Dr. Wendy Berg            Patient Name: Isidoro Dukes   : 1965  MRN# [id number]      DATE OF PROCEDURE: 22      DX: Atrial Génesis Flow Echocardiogram (ANA)     XCardioversion        X BMP     ? PLEASE CALL ABNORMAL RESULTS TO THE  PHYSICIAN? ATTENTION PATIENT: Pretesting is to be done before the cath. You do not have to fast for the lab work. You must go to the [facility]    behind the [facility] at [address] [place] Lyudmila to have this lab work done. Phone: [phone] Hours: 7:00 am to 5:00 pm Monday - Friday               Physicians's Signature_______________________________________________Date________Time_______                                                              *This consent is applicable for 30 days following patient signature*  Petra Moore / PROCEDURE    I (We), Isidoro Dukes authorize, Dr. Wendy Berg  and such assistants as may be selected by him/her, to perform the following operation/procedures:    Transesophageal Echocardiogram with Cardioversion     Note: If unable to obtain consent prior to an emergent procedure, document the emergent reason in the medical record. This procedure has been explained to my (our) satisfaction and included in the explanation was:   A) the intended benefit, nature, and extent of the procedure to be performed;     B) the significant risks involved and the probability of success;     C) alternative procedures and methods of treatment;     D) the dangers and probable consequences of such alternatives (including no procedure or treatment); E) the expected consequences of the procedure on my future health;     F) whether other qualified individuals would be performing important surgical tasks and / or whether  would be present to advise or support the procedure. I (we) understand that there are other risks of infection and other serious complications in the pre-operative/procedural and postoperative/procedural stages of my (our) care.      I (we) have asked all of the questions which I (we) thought were important in deciding whether or not to undergo treatment or diagnosis. These questions have been answered to my (our) satisfaction. I (we) understand that no assurance can be given that the procedure will be a success, and no guarantee or warranty of success has been given to me (us). 2. It has been explained to me (us) that during the course of the operation/procedure, unforeseen conditions may be revealed that necessitate extension of the original procedure(s) or different procedure(s) than those set forth in Paragraph 1. I (we) authorize and request that the above-named physician, his/her assistants or his/her designees, perform procedures as necessary and desirable if deemed to be in my (our) best interest.     3. I acknowledge that other health care personnel may be observing this procedure for the purpose of medical education or other specified purposes as may be necessary as requested and/or approved by my (our) physician. 4. I (we) consent to the disposal by the hospital Pathologist of the removed tissue, parts or organs in accordance with hospital policy. 5. I do_____ do not______ consent to the use of a local infiltration pain blocking agent that will be used by my provider/surgical provider to help alleviate pain during my procedure. 6. I do_____ do not_____ consent to an emergent blood transfusion in the case of a life-threatening situation that requires blood components to be administered. This consent is valid for 24 hours from the beginning of the procedure. 7. This patient does _____ or does not ______currently have a DNR status/order. If DNR order is in place, obtain \"Addendum to the Surgical Consent for ALL Patients with a DNR Order\" to address jeremy-operative status for limited intervention or DNR suspension. 8. I have read and fully understand the above Consent for Operation/Procedure and that all blanks were completed before I signed the consent. ____________________________________________________________________ ________/_______   Signature of Patient                                                                                                              Date / Time        ______________________________________________________________________ _______/_______   Witness to Signature                                                                                                              Date / Time      (If patient is unable to sign complete the following)     Patient is unable to sign because: __________________________________________________________   ______________________________________________________________________________________  [employer] If a phone consent is obtained, consent will be documented by using two health care professionals, each affirming that the consenting party has no questions and gives consent for the procedure discussed with the physician/provider.  _________________________________ _______________________________ ______/_____am/pm   2nd witness to phone consent Printed name                                                                 Date / Time     Informed consent:   I have provided the explanation described above in section 1 to the patient and/or legal representative.  I have provided the patient and/or legal representative with an opportunity to ask any questions about the proposed operation/procedure.       _______________________________________________________________________ ________/_______    Provider Signature                                                                                                                      Date / Time                                            Rome Whitehead Comment:    Transesophageal Echocardiogram with Cardioversion    Patient Name: Delia Munoz   : 1965   MRN# [id number]    Home Phone Number: [phone] Weight:    Wt Readings from Last 3 Encounters:   12/12/22 299 lb 6.4 oz (135.8 kg)   11/23/22 (!) 302 lb (137 kg)   11/04/22 (!) 303 lb (137.4 kg)        Insurance: Payor: Rafi Lora / Plan: UMR  / Product Type: *No Product type* /     Date of Procedure: 12/14/22 Time: 12L00 Arrival Time: 11:00    Diagnosis:  Atrial Fibrillation  Allergies:    Allergies   Allergen Reactions    Cozaar [Losartan Potassium] Other (See Comments)     Cough    Lisinopril Other (See Comments)     Cough    Pcn [Penicillins] Other (See Comments)     Unknown as a child think a rash            Nathaniel Herbert RN

## 2022-12-12 NOTE — PATIENT INSTRUCTIONS
Thank you for allowing us to care for you today! We want to ensure we can follow your treatment plan and we strive to give you the best outcomes and experience possible. If you ever have a life threatening emergency and call 911 - for an ambulance (EMS)   Our providers can only care for you at:   [facility] or [facility]. Even if you have someone take you or you drive yourself we can only care for you in a [address] [facility] facility. Our providers are not setup at the other healthcare locations! **It is YOUR responsibilty to bring medication bottles and/or updated medication list to [address] [place] [address]. This will allow us to better serve you and all your healthcare needs**  Please be informed that if you contact our office outside of normal business hours the physician on call cannot help with any scheduling or rescheduling issues, procedure instruction questions or any type of medication issue. We advise you for any urgent/emergency that you go to the nearest emergency room!     PLEASE CALL OUR OFFICE DURING NORMAL BUSINESS HOURS    Monday - Friday   8 am to 5 pm    [place]: [phone]    [place]:  [phone]

## 2022-12-12 NOTE — PROGRESS NOTES
CARDIOLOGY NOTE      12/12/2022    RE: Xochilt Collins  (1965)                               TO:  Dr. Arielle Roldan, APRN - CNP            Lavinia Pemberton is a 59 y.o. female who was seen today for management of  PAF                                    HPI:   Patient is here for    - Hypertension,is  well controlled, pt is  compliant with medicines  - Hyperlipidimea, lipids are in acceptable range. Pt  is  compliant with medicines  - Atrial fibrillation, pt is  compliant with meds. Patient hasno symptoms from atrial fibrillation                The patient does have cardiac complaints of recurrent CP was in ED    Xochilt Collins has the following history recorded in care path:  Patient Active Problem List    Diagnosis Date Noted    Atrial fibrillation with RVR ([facility]     Essential hypertension     VHD (valvular heart disease) 11/04/2022    Colon polyps 11/15/2018    Anticoagulated 08/08/2018    Left lateral epicondylitis 05/09/2018    Neck pain on left side 04/06/2018    Cervical radiculopathy 04/06/2018    MARLINE treated with BiPAP 03/09/2018    Paroxysmal atrial fibrillation ([facility] 08/10/2017    Dependent edema 07/20/2016    Venous stasis dermatitis of both lower extremities 04/38/3442    Metabolic syndrome 83/70/0202    H/O echocardiogram 07/18/2014    Hyperlipidemia     Obesity, morbid, BMI 40.0-49.9 ([facility])      Current Outpatient Medications   Medication Sig Dispense Refill    rivaroxaban (XARELTO) 20 MG TABS tablet Take 1 tablet by mouth daily (with breakfast) 90 tablet 1    metoprolol tartrate (LOPRESSOR) 25 MG tablet Take 1 tablet by mouth 2 times daily (Patient taking differently: Take 37.5 mg by mouth 2 times daily) 60 tablet 5    fenofibrate (TRICOR) 145 MG tablet Take 1 tablet by mouth daily 90 tablet 3    propafenone (RYTHMOL) 150 MG tablet TAKE 1 AND 1/2 TABLETS BY MOUTH EVERY 8 HOURS (Patient taking differently: 150 mg  in the morning and 150 mg at noon and 150 mg in the evening.  1 tablet every 8 hours.) 135 tablet 5    Compression Stockings MISC by Does not apply route Moderate Compression level 2 each 0    FISH OIL by Does not apply route daily        No current facility-administered medications for this visit. Allergies: Cozaar [losartan potassium], Lisinopril, and Pcn [penicillins]  Past Medical History:   Diagnosis Date    Atrial fibrillation with RVR ([address] 07/01/2017    Bell palsy     right side of face affected    H/O 24 hour EKG monitoring 3/30/10    holter: other then an episode of sinus tach at 150 bpm no other clinically significant arrhythmia seen. Symptoms reported in pts diary do not correlate with this episode    H/O cardiovascular stress test 1/14/10    Joe protocol: normal stress study. gated study shows uniform wall motion with calculated LV EF of over 67 %    H/O echocardiogram 5/20/10    ECHO:  There is no comparison study available. There is mild concentric LV hypertrophy. LVSF is normal, EF = > 55 %    H/O echocardiogram 07/18/2014    EF55%, borderline left atrial enlargement, normal LV systolic function, mild mitral regurg, no pericardial effusion.     H/O echocardiogram 07/03/2017    EF55%  AV mild  sclerotic    H/O echocardiogram 11/07/2018    EF55-60% sclerotic non stenosed AV    Heart palpitations     History of cardiac monitoring 7/21/14    Event - no clinically significant arrythmias    History of cardiac monitoring 08/10/2017    No clinically signifacant arrythmias    History of complete ECG 5/11/10    HX OTHER MEDICAL     \"not sure if have sleep apnea- have cpap but the last sleep study not conclusive so having another sleep [facility] done [facility] a cpap but never offically dx with sleep apnea\"    Hyperlipidemia 4/10    Hypertension     follows with Dr Per Hassan    Obesity      Past Surgical History:   Procedure Laterality Date    [address], 1994    times 2    COLONOSCOPY  2016    COLONOSCOPY N/A 11/15/2018    COLONOSCOPY POLYPECTOMY ABLATION/SNARE/HEMOCLIPS X4 TO PROXIMAL ASCENDING POLYPECTOMY SITE performed by Shama Hanson MD at [address] [place] [address], Nikolai, DIAGNOSTIC  2000    EYE SURGERY  2016    gavino cataract ext    ROTATOR CUFF REPAIR Right 10/2012      As reviewed   Family History   Problem Relation Age of Onset    High Blood Pressure Mother     Cancer Mother         lung ca    Heart Disease Father     Heart Surgery Father         cabg x 2    Other Father         AAA    Diabetes Father     Heart Attack Brother     Breast Cancer Neg Hx      Social History     Tobacco Use    Smoking status: Former     Packs/day: 0.50     Years: 30.00     Pack years: 15.00     Types: Cigarettes     Quit date: 2002     Years since quittin.4    Smokeless tobacco: Never   Substance Use Topics    Alcohol use: No     Comment: NO CAFFEINE      Review of Systems:    Constitutional: Negative for diaphoresis and fatigue  Psychological:Negative for anxiety or depression  HENT: Negative for headaches, nasal congestion, sinus pain or vertigo  Eyes: Negative for visual disturbance.    Endocrine: Negative for polydipsia/polyuria  Respiratory: Negative for shortness of breath  Cardiovascular: Negative for chest pain, dyspnea on exertion, claudication, edema, irregular heartbeat, murmur, palpitations or shortness of breath  Gastrointestinal: Negative for abdominal pain or heartburn  Genito-Urinary: Negative for urinary frequency/urgency  Musculoskeletal: Negative for muscle pain, muscular weakness, negative for pain in arm and leg or swelling in foot and leg  Neurological: Negative for dizziness, headaches, memory loss, numbness/tingling, visual changes, syncope  Dermatological: Negative for rash    Objective:  /82 (Site: Left Upper Arm, Position: Sitting, Cuff Size: Large Adult)   Pulse 88   Ht 5' 5\" (1.651 m)   Wt 299 lb 6.4 oz (135.8 kg)   LMP 2018   BMI 49.82 kg/m²   Wt Readings from Last 3 Encounters:   22 299 lb 6.4 oz (135.8 kg) 11/23/22 (!) 302 lb (137 kg)   11/04/22 (!) 303 lb (137.4 kg)     Body mass index is 49.82 kg/m². GENERAL - Alert, oriented, pleasant, in no apparent distress. EYES: No jaundice, no conjunctival pallor. SKIN: It is warm & dry. No rashes. No Echhymosis    HEENT - No clinically significant abnormalities seen. Neck - Supple. No jugular venous distention noted. No carotid bruits. Cardiovascular - Normal S1 and S2 without obvious murmur or gallop. Extremities - No cyanosis, clubbing, or significant edema. Pulmonary - No respiratory distress. No wheezes or rales. Abdomen - No masses, tenderness, or organomegaly. Musculoskeletal - No significant edema. No joint deformities. No muscle wasting. Neurologic - Cranial nerves II through XII are grossly intact. There were no gross focal neurologic abnormalities.     Lab Review   Lab Results   Component Value Date/Time    TROPONINT <0.010 12/08/2022 04:50 AM     BNP:  No results found for: BNP  PT/INR:    Lab Results   Component Value Date    INR 1.04 11/15/2018     Lab Results   Component Value Date    LABA1C 5.3 06/22/2015     Lab Results   Component Value Date    WBC 6.6 12/08/2022    HCT 48.1 (H) 12/08/2022    MCV 87.3 12/08/2022     12/08/2022     Lab Results   Component Value Date    CHOL 155 03/29/2021    TRIG 102 03/29/2021    HDL 39 (L) 03/29/2021    LDLCALC 96 03/29/2021    LDLDIRECT 88 01/20/2020     Lab Results   Component Value Date    ALT 15 01/20/2020    AST 19 01/20/2020     BMP:    Lab Results   Component Value Date/Time     12/08/2022 04:50 AM    K 3.9 12/08/2022 04:50 AM     12/08/2022 04:50 AM    CO2 25 12/08/2022 04:50 AM    BUN 19 12/08/2022 04:50 AM    CREATININE 0.7 12/08/2022 04:50 AM     CMP:   Lab Results   Component Value Date/Time     12/08/2022 04:50 AM    K 3.9 12/08/2022 04:50 AM     12/08/2022 04:50 AM    CO2 25 12/08/2022 04:50 AM    BUN 19 12/08/2022 04:50 AM    PROT 6.8 01/20/2020 09:23 AM TSH:    Lab Results   Component Value Date/Time    TSH 3.54 10/26/2016 12:07 PM    TSHHS 4.210 07/01/2017 03:20 PM         Impression:    1. Atrial fibrillation with RVR [facility])       Patient Active Problem List   Diagnosis Code    Hyperlipidemia E78.5    Obesity, morbid, BMI 40.0-49.9 (HCC) E66.01    H/O echocardiogram O01.94    Metabolic syndrome P24.64    Venous stasis dermatitis of both lower extremities I87.2    Dependent edema R60.9    Atrial fibrillation with RVR (HCC) I48.91    Essential hypertension I10    Paroxysmal atrial fibrillation (HCC) I48.0    MARLINE treated with BiPAP G47.33    Neck pain on left side M54.2    Cervical radiculopathy M54.12    Left lateral epicondylitis M77.12    Anticoagulated Z79.01    Colon polyps K63.5    VHD (valvular heart disease) I38       Assessment & Plan:    - CP; Dw pt   Over the patient is in atrial fibrillation at the present time we will schedule for ANA cardioversion and see if she feels better after that    -  Hypertension: Patients blood pressure is normal. Patient is advised about low sodium diet. Present medical regimen will not be changed. Will check the blood pressure and monitor  On BB    - A Fib on rythmol and xarelto  CPM        - Pulm HTN  Reecho   ? From MARLINE  PASP  46 mm Hg      -  LIPID MANAGEMENT:  Available lipid  lab data reviewed  and patient was given dietary advice. NCEP- ATP III guidelines reviewed with patient. -   Changes  in medicines made: No     Fibrate we will check the LDL                    Mortality from the morbid obesity is very high: Body mass index is 49.82 kg/m².             - ? MARLINE per neuro          Librado Citizen  [facility]

## 2022-12-12 NOTE — PROGRESS NOTES
Patient was here in office & educated on ANA/CV for Dx: A-Fib. Procedure is scheduled for 12/14/22 @ 12:00, w/arrival @ 11:00, @ [facility]. Procedure and risks were explained to patient. Consent forms were signed. Instructions were given to patient to remain NPO after midnight the night before procedure. Patient may take morning meds the morning of procedure with small amount of water. Patient is asked to call hospital @ [phone], 1 to 2 days before procedure to pre-register. Patient was notified that procedure could be delayed due to an emergency. Patient voiced understanding. Copies of consent, pre-testing orders, & instructions scanned into media    Patient informed of instructions and guidance for performing test.  Throat swab performed. Swab placed into labeled collection tube. Collection tube and lab order placed in plastic bag. Bag placed in biohazard bag and placed in fridge.  called for . Patient instructed to self quarantine until procedure.

## 2022-12-13 ENCOUNTER — TELEPHONE (OUTPATIENT)
Dept: CARDIOLOGY CLINIC | Age: 57
End: 2022-12-13

## 2022-12-13 LAB
SARS-COV-2: NOT DETECTED
SOURCE: NORMAL

## 2022-12-13 NOTE — TELEPHONE ENCOUNTER
Patient notified that procedure time has been changed to 11:00 on 12/14/22. Patient acknowledged and agreed.   Arrival time will be 10:00

## 2022-12-14 ENCOUNTER — HOSPITAL ENCOUNTER (OUTPATIENT)
Dept: NON INVASIVE DIAGNOSTICS | Age: 57
Discharge: HOME | End: 2022-12-14
Payer: COMMERCIAL

## 2022-12-14 VITALS
DIASTOLIC BLOOD PRESSURE: 72 MMHG | OXYGEN SATURATION: 97 % | HEART RATE: 49 BPM | RESPIRATION RATE: 14 BRPM | SYSTOLIC BLOOD PRESSURE: 124 MMHG

## 2022-12-14 PROCEDURE — 93312 ECHO TRANSESOPHAGEAL: CPT

## 2022-12-14 PROCEDURE — 93312 ECHO TRANSESOPHAGEAL: CPT | Performed by: INTERNAL MEDICINE

## 2022-12-14 PROCEDURE — 93005 ELECTROCARDIOGRAM TRACING: CPT | Performed by: INTERNAL MEDICINE

## 2022-12-14 PROCEDURE — 92960 CARDIOVERSION ELECTRIC EXT: CPT | Performed by: INTERNAL MEDICINE

## 2022-12-14 PROCEDURE — 7100000001 HC PACU RECOVERY - ADDTL 15 MIN

## 2022-12-14 PROCEDURE — 7100000000 HC PACU RECOVERY - FIRST 15 MIN

## 2022-12-14 PROCEDURE — 92960 CARDIOVERSION ELECTRIC EXT: CPT

## 2022-12-14 NOTE — DISCHARGE INSTR - COC
Continuity of Care Form    Patient Name: Harper Finley   :  1965  MRN:  [id number]    Admit date:  2022  Discharge date:  ***    Code Status Order: Prior   Advance Directives:     Admitting Physician:  No admitting provider for patient encounter. PCP: No primary care provider on file. Discharging Nurse: [facility] Unit/Room#: No information available for this encounter.   Discharging Unit Phone Number: ***    Emergency Contact:   Extended Emergency Contact Information  Primary Emergency Contact: Muna Howard  Address: [address] [place] [address] Phone: [phone]  Relation: Spouse  Secondary Emergency Contact: Hiral Kemp  Home Phone: [phone]  Relation: Child    Past Surgical History:  Past Surgical History:   Procedure Laterality Date    [address]    times 2    COLONOSCOPY  2016    COLONOSCOPY N/A 11/15/2018    COLONOSCOPY POLYPECTOMY ABLATION/SNARE/HEMOCLIPS X4 TO PROXIMAL ASCENDING POLYPECTOMY SITE performed by Dionisio Hernández MD at [facility], DIAGNOSTIC  2000    EYE SURGERY  2016    gavino cataract ext    ROTATOR CUFF REPAIR Right 10/2012       Immunization History:   Immunization History   Administered Date(s) Administered    Influenza, AFLURIA (age 1 yrs+), FLUZONE, (age 10 mo+), MDV, 0.5mL 10/26/2016       Active Problems:  Patient Active Problem List   Diagnosis Code    Hyperlipidemia E78.5    Obesity, morbid, [facility] 40.0-49.9 [facility] E66.01    H/O echocardiogram A93.94    Metabolic syndrome T24.68    Venous stasis dermatitis of both lower extremities I87.2    Dependent edema R60.9    Atrial fibrillation with RVR (HCC) I48.91    Essential hypertension I10    PAF (paroxysmal atrial fibrillation) (HCC) I48.0    MARLINE treated with BiPAP G47.33    Neck pain on left side M54.2    Cervical radiculopathy M54.12    Left lateral epicondylitis M77.12    Anticoagulated Z79.01    Colon polyps K63.5    VHD (valvular heart disease) I38       Isolation/Infection:   Isolation            No Isolation          Patient Infection Status       Infection Onset Added Last Indicated Last Indicated By Review Planned Expiration Resolved Resolved By    None active    Resolved    COVID-19 (Rule Out) 22 COVID-19 (Ordered)   22 Rule-Out Test Resulted            Nurse Assessment:  Last Vital Signs: /72   Pulse (!) 49   Resp 14   LMP 2018   SpO2 97%     Last documented pain score (0-10 scale):    Last Weight:   Wt Readings from Last 1 Encounters:   22 299 lb 6.4 oz (135.8 kg)     Mental Status:  {IP PT MENTAL STATUS:}    IV Access:  { HILARY IV ACCESS:762765396}    Nursing Mobility/ADLs:  Walking   {CHP DME XAKY:011710108}  Transfer  {CHP DME MBXJ:236087021}  Bathing  {CHP DME UVCW:208449648}  Dressing  {CHP DME DJXZ:321196895}  Toileting  {CHP DME DGTX:346588527}  Feeding  {CHP DME XVQW:618967418}  Med Admin  {P DME SXUY:067685496}  Med Delivery   { HILARY MED Delivery:562401102}    Wound Care Documentation and Therapy:        Elimination:  Continence: Bowel: {YES / CD:92341}  Bladder: {YES / Y}  Urinary Catheter: {Urinary Catheter:079248714}   Colostomy/Ileostomy/Ileal Conduit: {YES / Y}       Date of Last BM: ***  No intake or output data in the 24 hours ending 22 1316  No intake/output data recorded.     Safety Concerns:     [address] [employer] HILARY Safety Concerns:885749392}    Impairments/Disabilities:      [address] Impairments/Disabilities:799996334}    Nutrition Therapy:  Current Nutrition Therapy:   [address] Diet List:254302438}    Routes of Feeding: {CHP DME Other Feedings:196006113}  Liquids: {Slp liquid thickness:28160}  Daily Fluid Restriction: {CHP DME Yes amt example:297469799}  Last Modified Barium Swallow with Video (Video Swallowing Test): {Done Not Done UGRY:653532216}    Treatments at the Time of Hospital Discharge:   Respiratory Treatments: ***  Oxygen Therapy:  {Therapy; copd oxygen:55679}  Ventilator:    {MH CC Vent GHSF:447378590}    Rehab Therapies: {THERAPEUTIC INTERVENTION:5202604989}  Weight Bearing Status/Restrictions: 50Leo BILLY Weight Bearin}  Other Medical Equipment (for information only, NOT a DME order):  {EQUIPMENT:700620535}  Other Treatments: ***    Patient's personal belongings (please select all that are sent with patient):  {[facility] DME Belongings:833858026}    RN SIGNATURE:  {Esignature:942614671}    CASE MANAGEMENT/SOCIAL WORK SECTION    Inpatient Status Date: ***    Readmission Risk Assessment Score:  Readmission Risk              Risk of Unplanned Readmission:  0           Discharging to Facility/ Agency   Name:   Address:  Phone:  Fax:    Dialysis Facility (if applicable)   Name:  Address:  Dialysis Schedule:  Phone:  Fax:    / signature: {Esignature:507568173}    PHYSICIAN SECTION    Prognosis: {Prognosis:7956110852}    Condition at Discharge: 50Leo Amador Patient Condition:450094444}    Rehab Potential (if transferring to Rehab): {Prognosis:3967698802}    Recommended Labs or Other Treatments After Discharge: ***    Physician Certification: I certify the above information and transfer of Saadia Boothe  is necessary for the continuing treatment of the diagnosis listed and that she requires {Admit to Appropriate Level of Care:01334} for {GREATER/LESS:645946328} 30 days.      Update Admission H&P: [facility]}    PHYSICIAN SIGNATURE:  {Esignature:290389970}

## 2022-12-16 LAB
EKG ATRIAL RATE: 375 BPM
EKG ATRIAL RATE: 49 BPM
EKG DIAGNOSIS: NORMAL
EKG DIAGNOSIS: NORMAL
EKG P AXIS: 18 DEGREES
EKG P-R INTERVAL: 196 MS
EKG Q-T INTERVAL: 300 MS
EKG Q-T INTERVAL: 460 MS
EKG QRS DURATION: 108 MS
EKG QRS DURATION: 110 MS
EKG QTC CALCULATION (BAZETT): 391 MS
EKG QTC CALCULATION (BAZETT): 415 MS
EKG R AXIS: 34 DEGREES
EKG R AXIS: 79 DEGREES
EKG T AXIS: 65 DEGREES
EKG T AXIS: 83 DEGREES
EKG VENTRICULAR RATE: 102 BPM
EKG VENTRICULAR RATE: 49 BPM

## 2022-12-20 ENCOUNTER — OFFICE VISIT (OUTPATIENT)
Dept: CARDIOLOGY CLINIC | Age: 57
End: 2022-12-20
Payer: COMMERCIAL

## 2022-12-20 VITALS
HEIGHT: 65 IN | SYSTOLIC BLOOD PRESSURE: 160 MMHG | HEART RATE: 54 BPM | DIASTOLIC BLOOD PRESSURE: 90 MMHG | WEIGHT: 293 LBS | BODY MASS INDEX: 48.82 KG/M2

## 2022-12-20 DIAGNOSIS — I48.0 PAF (PAROXYSMAL ATRIAL FIBRILLATION) (HCC): Primary | ICD-10-CM

## 2022-12-20 PROCEDURE — 99213 OFFICE O/P EST LOW 20 MIN: CPT | Performed by: NURSE PRACTITIONER

## 2022-12-20 PROCEDURE — 3074F SYST BP LT 130 MM HG: CPT | Performed by: NURSE PRACTITIONER

## 2022-12-20 PROCEDURE — 93000 ELECTROCARDIOGRAM COMPLETE: CPT | Performed by: NURSE PRACTITIONER

## 2022-12-20 PROCEDURE — 3078F DIAST BP <80 MM HG: CPT | Performed by: NURSE PRACTITIONER

## 2022-12-20 ASSESSMENT — ENCOUNTER SYMPTOMS
SHORTNESS OF BREATH: 0
ORTHOPNEA: 0

## 2022-12-20 NOTE — PROGRESS NOTES
12/20/2022  Primary cardiologist: Dr. Aris Fragoso:   Chino Merida  is an established 59 y.o.  female here for f/u on ANA/ Cardioversion      SUBJECTIVE/OBJECTIVE:  Chino Merida is a 59 y.o. female with a history of  paroxysmal atrial fibrillation, VHD hypertension, hyperlipidemia, obesity, and chronic anticoagulation use      HPI :   Chino Merida reports recently underwent cardioversion. She has converted to sinus bradycardia. Feels her energy level has increased. No further episodes of palpitations, lightheadedness or dizziness    Review of Systems   Constitutional: Negative for diaphoresis and malaise/fatigue. Cardiovascular:  Negative for chest pain, claudication, dyspnea on exertion, irregular heartbeat, leg swelling, near-syncope, orthopnea, palpitations and paroxysmal nocturnal dyspnea. Respiratory:  Negative for shortness of breath. Neurological:  Negative for dizziness and light-headedness. Vitals:    12/20/22 0904   BP: (!) 160/90   Site: Left Lower Arm   Position: Sitting   Cuff Size: Medium Adult   Pulse: 54   Weight: (!) 300 lb 9.6 oz (136.4 kg)   Height: 5' 5\" (1.651 m)     Patient-Reported Vitals 3/2/2021   Patient-Reported Weight 292#   Patient-Reported Height 5'5\"   Patient-Reported Systolic 637   Patient-Reported Diastolic 78     Wt Readings from Last 3 Encounters:   12/20/22 (!) 300 lb 9.6 oz (136.4 kg)   12/12/22 299 lb 6.4 oz (135.8 kg)   11/23/22 (!) 302 lb (137 kg)     Body mass index is 50.02 kg/m². Physical Exam  Vitals reviewed. Constitutional:       Appearance: Normal appearance. She is obese. HENT:      Head: Normocephalic and atraumatic. Mouth/Throat:      Mouth: Mucous membranes are moist.   Eyes:      Extraocular Movements: Extraocular movements intact. Pupils: Pupils are equal, round, and reactive to light. Neck:      Vascular: No carotid bruit. Cardiovascular:      Rate and Rhythm: Regular rhythm. Bradycardia present. Pulses: Normal pulses.       Heart sounds: Normal heart sounds. Pulmonary:      Effort: Pulmonary effort is normal.      Breath sounds: Normal breath sounds. No rales. Chest:      Chest wall: No tenderness. Abdominal:      General: There is no distension. Palpations: Abdomen is soft. Tenderness: There is no abdominal tenderness. Musculoskeletal:      Cervical back: No tenderness. Right lower leg: No edema. Left lower leg: No edema. Skin:     General: Skin is warm and dry. Capillary Refill: Capillary refill takes less than 2 seconds. Neurological:      Mental Status: She is alert and oriented to person, place, and time. Psychiatric:         Mood and Affect: Mood normal.         Behavior: Behavior normal.              Current Outpatient Medications   Medication Sig Dispense Refill    metoprolol tartrate (LOPRESSOR) 25 MG tablet Take 0.5 tablets by mouth 2 times daily 60 tablet 0    rivaroxaban (XARELTO) 20 MG TABS tablet Take 1 tablet by mouth daily (with breakfast) 90 tablet 1    fenofibrate (TRICOR) 145 MG tablet Take 1 tablet by mouth daily 90 tablet 3    propafenone (RYTHMOL) 150 MG tablet TAKE 1 AND 1/2 TABLETS BY MOUTH EVERY 8 HOURS (Patient taking differently: 150 mg  in the morning and 150 mg at noon and 150 mg in the evening. 1 tablet every 8 hours.) 135 tablet 5    Compression Stockings MISC by Does not apply route Moderate Compression level (Patient taking differently: by Does not apply route Moderate Compression level- wears occasionally) 2 each 0    FISH OIL by Does not apply route daily        No current facility-administered medications for this visit.           All pertinent data reviewed and discussed with patient       ASSESSMENT/PLAN:    Paroxysmal atrial fibrillation  Which she underwent direct-current cardioversion  Converted to sinus bradycardia  EKG today sinus bradycardia  She is feeling better post cardioversion: Recommend to continue with metoprolol and Rythmol  Continue with xarelto for stroke prevention  Avoid caffeine and stimulants             Tests ordered:  none   Follow-up  3 mo     Signed:  EVE Rao CNP, 12/20/2022, 9:22 AM    An electronic signature was used to authenticate this note. Please note this report has been partially produced using speech recognition software and may contain errors related to that system including errors in grammar, punctuation, and spelling, as well as words and phrases that may be inappropriate. If there are any questions or concerns please feel free to contact the dictating provider for clarification.

## 2022-12-21 ENCOUNTER — TELEPHONE (OUTPATIENT)
Dept: CARDIOLOGY CLINIC | Age: 57
End: 2022-12-21

## 2022-12-21 ENCOUNTER — OFFICE VISIT (OUTPATIENT)
Dept: CARDIOLOGY CLINIC | Age: 57
End: 2022-12-21

## 2022-12-21 VITALS
HEIGHT: 65 IN | DIASTOLIC BLOOD PRESSURE: 88 MMHG | WEIGHT: 293 LBS | BODY MASS INDEX: 48.82 KG/M2 | HEART RATE: 138 BPM | SYSTOLIC BLOOD PRESSURE: 138 MMHG

## 2022-12-21 DIAGNOSIS — I48.0 PAF (PAROXYSMAL ATRIAL FIBRILLATION) (HCC): ICD-10-CM

## 2022-12-21 DIAGNOSIS — Z01.810 PRE-OPERATIVE CARDIOVASCULAR EXAMINATION: Primary | ICD-10-CM

## 2022-12-21 NOTE — TELEPHONE ENCOUNTER
/80  P74  Palpitations, has to take a deep breath for them to ease up. Pt take Rythmol 150 every 8 hr.   OV yesterday f/u CV/ANA says she was doing great, went home from [address] and starting doing stuff around the house a started having the palpitations and they have not stopped.

## 2022-12-21 NOTE — PATIENT INSTRUCTIONS
Thank you for allowing us to care for you today! We want to ensure we can follow your treatment plan and we strive to give you the best outcomes and experience possible. If you ever have a life threatening emergency and call 911 - for an ambulance (EMS)   Our providers can only care for you at:   [facility] or [facility]. Even if you have someone take you or you drive yourself we can only care for you in a [facility]. Our providers are not setup at the other healthcare locations! **It is YOUR responsibilty to bring medication bottles and/or updated medication list to [address] [place] [address]. This will allow us to better serve you and all your healthcare needs**  Please be informed that if you contact our office outside of normal business hours the physician on call cannot help with any scheduling or rescheduling issues, procedure instruction questions or any type of medication issue. We advise you for any urgent/emergency that you go to the nearest emergency room!     PLEASE CALL OUR OFFICE DURING NORMAL BUSINESS HOURS    Monday - Friday   8 am to 5 pm    [place]: [phone]    [place]:  [phone]

## 2022-12-22 ENCOUNTER — TELEPHONE (OUTPATIENT)
Dept: CARDIOLOGY CLINIC | Age: 57
End: 2022-12-22

## 2022-12-27 ENCOUNTER — TELEPHONE (OUTPATIENT)
Dept: CARDIOLOGY CLINIC | Age: 57
End: 2022-12-27

## 2022-12-29 ENCOUNTER — INITIAL CONSULT (OUTPATIENT)
Dept: CARDIOLOGY CLINIC | Age: 57
End: 2022-12-29
Payer: COMMERCIAL

## 2022-12-29 VITALS
DIASTOLIC BLOOD PRESSURE: 70 MMHG | HEIGHT: 65 IN | HEART RATE: 107 BPM | BODY MASS INDEX: 48.82 KG/M2 | WEIGHT: 293 LBS | SYSTOLIC BLOOD PRESSURE: 136 MMHG

## 2022-12-29 DIAGNOSIS — I48.0 PAF (PAROXYSMAL ATRIAL FIBRILLATION) (HCC): Primary | ICD-10-CM

## 2022-12-29 PROCEDURE — 99204 OFFICE O/P NEW MOD 45 MIN: CPT | Performed by: INTERNAL MEDICINE

## 2022-12-29 PROCEDURE — 3074F SYST BP LT 130 MM HG: CPT | Performed by: INTERNAL MEDICINE

## 2022-12-29 PROCEDURE — 93000 ELECTROCARDIOGRAM COMPLETE: CPT | Performed by: INTERNAL MEDICINE

## 2022-12-29 PROCEDURE — 3078F DIAST BP <80 MM HG: CPT | Performed by: INTERNAL MEDICINE

## 2022-12-29 NOTE — LETTER
[facility]     Dr. Romi Barrera     PROCEDURE: Atrial Fibrillation/Atrial Flutter Ablation    Date of Procedure: 2023  Time: [address] Time: 3672    Patient Name: Reuben Negron  : 1965  MRN# [id number]                                                                                                 HOSPITAL: [facility])    Call to Pre-Colp at [phone]  2 days before your procedure    x Please have blood work and chest-x-ray done  at [facility], [facility] or [facility].     X Please have COVID-19 Test done on 23 at the [place] [address]. You will need to Quarantine yourself until procedure. x Please do not have anything by mouth after midnight prior to or 8 hours before the procedure.    x Hold ALL medications the morning of procedure. IMPORTANT NOTICE TO PATIENTS: Prior Authorization  We will contact your insurance for prior authorization for the CPT code related to the procedure it is the patient's responsibility to contact their insurance to ensure they are following their medical plans provisions or requirements! Patient Signature:  _______________________      Staff: Elvira Castillo MA     Staff Given Instructions:_______________________________                              [facility]     Dr. Kingston Captain: Atrial Fibrillation/Atrial Flutter Ablation with transesophageal echocardiogram    Date of Procedure: 2023 Time: [address]  Time: [place]    Patient Name: Reuben Negron  : 1965  MRN# [id number]    Day of Procedure Cath Lab Holding area Pre op Orders:    ? YOU HAVE MY PERMISSION TO TALK TO THE PATIENT-PLEASE   ? NPO  ? IF ORDERED FOR AFIB PATIENTS ONLY CARDIAC CT SCAN ANGIO (PULMONARY MAPPING)   ? Anesthesia  ? ANA with Anesthesia  ? IV peripheral saline lock x 2 sites  (at least one in preferred left arm).   ? Type & Screen STAT on arrival.  ? ANTICOAGULATION:  Hold Xarelto the morning of the procedure  ? If taking Coumadin, PT INR  STAT on arrival day of procedure. ? Insert Andino catheter (male patients >>please use coude andino catheter). ? Female patients <=48years of age >> urine HCG test.  ? Diabetic patients >> Accu check Blood sugar check. ? Document home medications in EPIC and include date and time of last dose. ? Notify Dr. Sallye Dandy of abnormal lab results. ? Chest Prep> Clip hair anterior chest and posterior back (clavicle to umbilicus). ? Groin Prep> Clip hair bilateral groins for femoral access (medial). Physician Signature:_______________________Date:____________Time:___________                                       *This consent is applicable for 30 days following patient signature*    [address] / PROCEDURE    I (We), Beverley Mcguire authorize, Dr. Lilian Knutson     and such assistants as may be selected by him/her, to perform the following operation/procedures:   Atrial Fibrillation/Atrial Flutter Ablation with transesophageal echocardiogram    Note: If unable to obtain consent prior to an emergent procedure, document the emergent reason in the medical record. This procedure has been explained to my (our) satisfaction and included in the explanation was:   A) the intended benefit, nature, and extent of the procedure to be performed;   B) the significant risks involved and the probability of success;   C) alternative procedures and methods of treatment;   D) the dangers and probable consequences of such alternatives (including no procedure or treatment); E) the expected consequences of the procedure on my future health;   F) whether other qualified individuals would be performing important surgical tasks and / or whether  would be present to advise or support the procedure.      I (we) understand that there are other risks of infection and other serious complications in the pre-operative/procedural and postoperative/procedural stages of my (our) care. I (we) have asked all of the questions which I (we) thought were important in deciding whether or not to undergo treatment or diagnosis. These questions have been answered to my (our) satisfaction. I (we) understand that no assurance can be given that the procedure will be a success, and no guarantee or warranty of success has been given to me (us). 2. It has been explained to me (us) that during the course of the operation/procedure, unforeseen conditions may be revealed that necessitate extension of the original procedure(s) or different procedure(s) than those set forth in Paragraph 1. I (we) authorize and request that the above-named physician, his/her assistants or his/her designees, perform procedures as necessary and desirable if deemed to be in my (our) best interest.           3. I acknowledge that other health care personnel may be observing this procedure for the purpose of medical education or other specified purposes as may be necessary as requested and/or approved by my (our) physician. 4. I (we) consent to the disposal by the hospital Pathologist of the removed tissue, parts or organs in accordance with hospital policy. 5. I do_____ do not______ consent to the use of a local infiltration pain blocking agent that will be used by my provider/surgical provider to help alleviate pain during my procedure. 6. I do_____ do not_____ consent to an emergent blood transfusion in the case of a life-threatening situation that requires blood components to be administered. This consent is valid for 24 hours from the beginning of the procedure. 7. This patient does _____ or does not ______currently have a DNR status/order.  If DNR order is in place, obtain \"Addendum to the Surgical Consent for ALL Patients with a DNR Order\" to address jeremy-operative status for limited intervention or DNR suspension. 8. I have read and fully understand the above Consent for Operation/Procedure and that all blanks were completed before I signed the consent.     _______________________________________________  _____/____am/pm   Signature of Patient or legal representative Printed Name / Relationship Date / Time     _______________________________________________   _____/____ am/pm   Witness to Signature Printed Name Date / Time       Informed consent:   I have provided the explanation described above in section 1 to the patient and/or legal representative. I have provided the patient and/or legal representative with an opportunity to ask any questions about the proposed operation/procedure.     ______________________________Dr.Pradeep Hudson Null    ____/____ am/pm   Provider / Proceduralist Printed Name Date / Time     Revised 2021                                 [facility]     Dr. Jose Antonio Hawkins     PATIENT NAME:    Judy Eden                               :   1965    PROCEDURE: Atrial Fibrillation/Atrial Flutter Ablation with transesophageal echocardiogram       DATE OF PROCEDURE: 2023    DIAGNOSIS:  I48.91, Z01.810, Z79.0                     X MAG    X   PHOS       X   BMP                     X CBC     X    PT    X   PTT                    X     Chest -x-Ray PA & Lateral View      ? PLEASE CALL ABNORMAL RESULTS TO THE REQUESTING PHYSICIAN? ATTENTION PATIENTS:  You do not have to fast for the lab work. You must go to the [address] [place] [address] or [facility]  to have the lab work done.     Physician Signature:___________________Date:_____________Time:___________                                      [facility]   Dr. [facility] Hawkins     PROCEDURE TO SCHEDULE:    Atrial Fibrillation/Atrial Flutter Ablation with transesophageal echocardiogram    Patient Name: Judy Eden   : 1965  MRN# [id number]    Home Phone Number: [phone]   Weight:    Wt Readings from Last 3 Encounters:   12/29/22 (!) 301 lb (136.5 kg)   12/21/22 300 lb (136.1 kg)   12/20/22 (!) 300 lb 9.6 oz (136.4 kg)        Insurance: Payor: UMR / Plan: UMR  / Product Type: *No Product type* /     Date of Procedure: 2/1/2023 Time: 0730 Arrival Time: 8349    Diagnosis:  I48.91 (Atrial Fibrillation)  Allergies:    Allergies   Allergen Reactions    Cozaar [Losartan Potassium] Other (See Comments)     Cough    Lisinopril Other (See Comments)     Cough    Pcn [Penicillins] Other (See Comments)     Unknown as a child think a rash        Dileep Nieves MA

## 2022-12-29 NOTE — PROGRESS NOTES
Electrophysiology Reconsult Note      Reason for consultation: Atrial fibrillation    Chief complaint :  Palpitations, shortness of breath    Referring physician:        Primary care physician: EVE Sanderson - CNP      History of Present Illness: This visit (12/29/2022)      Chief Complaint   Patient presents with    Consultation     Lefty Soto- thinks its due to being overweight  Palpitations- Pt is having palpitations, they are constant but not painful like they were last time she had them  No nicotine, no alcohol  Caffeine- rarely- hardly ever  Exercise- no    Shortness of Breath    Palpitations           Previous visit: (10/27/2017      Chief Complaint   Patient presents with    Atrial Fibrillation     Pt states palpitations happen rarely. Pt denies chest pain, shortness of breath, dizziness, and edema. Previous visit:    Chief Complaint   Patient presents with    Atrial Fibrillation     MO-discuss afib ablation. Attended class on 7/14. Patient denies CP, dizziness, edema. She does report palpitations constantly as well as SOB at times due to the palpitations. Patient is still wearing event monitor, it is due to come off tomorrow. Patient was in the hospital in Atrial fibrillation in July. Denies syncope  On medications last few days she has been much better. Patient has symptoms of sleep apnea - snoring - tired when awake and sleeps in the day. Past medical history:   Past Medical History:   Diagnosis Date    Atrial fibrillation with RVR ([address] 07/01/2017    Bell palsy     right side of face affected    H/O 24 hour EKG monitoring 03/30/2010    holter: other then an episode of sinus tach at 150 bpm no other clinically significant arrhythmia seen. Symptoms reported in pts diary do not correlate with this episode    H/O cardiovascular stress test 01/14/2010    Joe protocol: normal stress study.  gated study shows uniform wall motion with calculated LV EF of over 67 %    H/O echocardiogram 05/20/2010    ECHO:  There is no comparison study available. There is mild concentric LV hypertrophy. LVSF is normal, EF = > 55 %    H/O echocardiogram 07/18/2014    EF55%, borderline left atrial enlargement, normal LV systolic function, mild mitral regurg, no pericardial effusion. H/O echocardiogram 07/03/2017    EF55%  AV mild  sclerotic    H/O echocardiogram 11/07/2018    EF55-60% sclerotic non stenosed AV    H/O transesophageal echocardiography (ANA) for monitoring 12/14/2022    Calcification noted on the non-coronary cusp of the aortic valve. MILD MR.     Heart palpitations     History of cardiac monitoring 07/21/2014    Event - no clinically significant arrythmias    History of cardiac monitoring 08/10/2017    No clinically signifacant arrythmias    History of complete ECG 05/11/2010    HX OTHER MEDICAL     \"not sure if have sleep apnea- have cpap but the last sleep study not conclusive so having another sleep study done at [facility] a cpap but never offically dx with sleep apnea\"    Hyperlipidemia 04/2010    Hypertension     follows with Dr Conrad Fuelling    Obesity        Surgical history :   Past Surgical History:   Procedure Laterality Date    [address] [place]    times 2    COLONOSCOPY  2016    COLONOSCOPY N/A 11/15/2018    COLONOSCOPY POLYPECTOMY ABLATION/SNARE/HEMOCLIPS X4 TO PROXIMAL ASCENDING POLYPECTOMY SITE performed by Moshe Wilkinson MD at [address] [place] [address], [address] [place] [address]    EYE SURGERY  2016    gavino cataract ext    ROTATOR CUFF REPAIR Right 10/2012       Family history:   Family History   Problem Relation Age of Onset    High Blood Pressure Mother     Cancer Mother         lung ca    Heart Disease Father     Heart Surgery Father         cabg x 2    Other Father         AAA    Diabetes Father     Heart Attack Brother     Breast Cancer Neg Hx        Social history :  reports that she quit smoking about 20 years ago. Her smoking use included cigarettes. She has a 15.00 pack-year smoking history. She has never used smokeless tobacco. She reports that she does not drink alcohol and does not use drugs. Allergies   Allergen Reactions    Cozaar [Losartan Potassium] Other (See Comments)     Cough    Lisinopril Other (See Comments)     Cough    Pcn [Penicillins] Other (See Comments)     Unknown as a child think a rash       Current Outpatient Medications on File Prior to Visit   Medication Sig Dispense Refill    metoprolol tartrate (LOPRESSOR) 25 MG tablet Take 0.5 tablets by mouth 2 times daily 60 tablet 0    rivaroxaban (XARELTO) 20 MG TABS tablet Take 1 tablet by mouth daily (with breakfast) 90 tablet 1    fenofibrate (TRICOR) 145 MG tablet Take 1 tablet by mouth daily 90 tablet 3    propafenone (RYTHMOL) 150 MG tablet TAKE 1 AND 1/2 TABLETS BY MOUTH EVERY 8 HOURS (Patient taking differently: 150 mg  in the morning and 150 mg at noon and 150 mg in the evening. 1 tablet every 8 hours.) 135 tablet 5    Compression Stockings MISC by Does not apply route Moderate Compression level (Patient taking differently: by Does not apply route Moderate Compression level- wears occasionally) 2 each 0    FISH OIL by Does not apply route daily        No current facility-administered medications on file prior to visit. Review of Systems:   Review of Systems   Constitutional: Positive for fatigue. Negative for activity change, chills and fever. HENT: Negative for congestion, ear pain and tinnitus. Eyes: Negative for photophobia, pain and visual disturbance. Respiratory:  Negative for cough, chest tightness and wheezing. Cardiovascular: Positive for palpitations. Negative for chest pain and leg swelling. Shortness of breath with exertion  Gastrointestinal: Negative for abdominal pain, blood in stool, constipation, diarrhea, nausea and vomiting. Endocrine: Negative for cold intolerance and heat intolerance. Genitourinary: Negative for dysuria, flank pain and hematuria. Musculoskeletal: Negative for arthralgias, back pain, myalgias and neck stiffness. Skin: Negative for color change and rash. Allergic/Immunologic: Negative for food allergies. Neurological: Negative for dizziness, light-headedness, numbness and headaches. Hematological: Does not bruise/bleed easily. Psychiatric/Behavioral: Negative for agitation, behavioral problems and confusion. Physical Examination:    BP (!) 158/88 (Site: Left Upper Arm, Position: Sitting, Cuff Size: Large Adult)   Pulse (!) 107   Ht 5' 5\" (1.651 m)   Wt (!) 301 lb (136.5 kg)   LMP 09/19/2018   BMI 50.09 kg/m²    Wt Readings from Last 3 Encounters:   12/29/22 (!) 301 lb (136.5 kg)   12/21/22 300 lb (136.1 kg)   12/20/22 (!) 300 lb 9.6 oz (136.4 kg)     Body mass index is 50.09 kg/m². Physical Exam   Constitutional: She is oriented to person, place, and time and well-developed, well-nourished, and in no distress. HENT:   Head: Normocephalic and atraumatic. Eyes: Conjunctivae and EOM are normal. Pupils are equal, round, and reactive to light. Right eye exhibits no discharge. Neck: Normal range of motion. No JVD present. No thyromegaly present. Cardiovascular: Normal rate, regular rhythm and normal heart sounds. Exam reveals no friction rub. No murmur heard. Pulmonary/Chest: Effort normal and breath sounds normal. No stridor. No respiratory distress. She has no wheezes. Abdominal: Soft. Bowel sounds are normal. She exhibits no distension. There is no tenderness. Musculoskeletal: Normal range of motion. She exhibits no edema or tenderness. Neurological: She is alert and oriented to person, place, and time. She displays normal reflexes. No cranial nerve deficit. Coordination normal.   Skin: Skin is warm and dry. No rash noted. No erythema.    Psychiatric: Mood and affect normal.             CBC:   Lab Results   Component Value Date/Time WBC 6.6 12/08/2022 04:50 AM    HGB 16.2 12/08/2022 04:50 AM    HCT 48.1 12/08/2022 04:50 AM     12/08/2022 04:50 AM     Lipids:   Lab Results   Component Value Date    CHOL 155 03/29/2021    TRIG 102 03/29/2021    HDL 39 (L) 03/29/2021    LDLCALC 96 03/29/2021    LDLDIRECT 88 01/20/2020     PT/INR:   Lab Results   Component Value Date/Time    INR 1.04 11/15/2018 08:25 AM        BMP:    Lab Results   Component Value Date     12/08/2022    K 3.9 12/08/2022     12/08/2022    CO2 25 12/08/2022    BUN 19 12/08/2022     CMP:   Lab Results   Component Value Date    AST 19 07/01/2017    PROT 7.0 07/01/2017    BILITOT 0.4 07/01/2017    ALKPHOS 61 07/01/2017     TSH:    Lab Results   Component Value Date/Time    TSH 3.54 10/26/2016 12:07 PM       EKGINTERPRETATION - EKG Interpretation:  atrial fibrillation    Vitals:    12/29/22 1038   BP: (!) 158/88   Site: Left Upper Arm   Position: Sitting   Cuff Size: Large Adult   Pulse: (!) 107   Weight: (!) 301 lb (136.5 kg)   Height: 5' 5\" (1.651 m)        Repeat /70    IMPRESSION / RECOMMENDATIONS:     1. PAF  2. Morbid obesity BMI 50  3. Sleep apnea  4. HTN - BP elevated but she has not taken her medications today and usually its normal she reports. Last visit to cardiology office her BP was in 603'B systolic  5. hld  6.  Metabolic syndrome      Patient is now in atrial fibrillation  Patient has PAF episodes - on metoprolol, rhythmol and xarelto  Patient HR is in 50's when not in atrial fibrillation  Patient is feeling symptoms  She did really well for some time on medications  Recommend ablation  Discussed risks with procedure    The risks including, but not limited to, vascular injury, bleeding, infection, radiation exposure, injury to cardiac and surrounding structures (including esophageal and pulmonary vein injury), injury to the normal conduction system of the heart (possibly requiring a pacemaker), stroke, myocardial infarction and death were all discussed. The patient considered the risks, benefits and alternatives; decided to proceed with the procedure. Discussed about weight loss and patient is going to bariatrics appt and she is working on [employer] [facility] [employer]. She wants to do diet but not baritric surgery    BP is elevated but she think it is because she is anxious. Repeat BP is 136/70        Thanks again for allowing me to participate in care of this patient. Please call me if you have any questions. With best regards.       Mariaelena Arreguin MD

## 2023-01-09 ENCOUNTER — TELEPHONE (OUTPATIENT)
Dept: CARDIOLOGY CLINIC | Age: 58
End: 2023-01-09

## 2023-01-09 DIAGNOSIS — R06.02 SOB (SHORTNESS OF BREATH): Primary | ICD-10-CM

## 2023-01-10 ENCOUNTER — HOSPITAL ENCOUNTER (OUTPATIENT)
Age: 58
Discharge: HOME | End: 2023-01-10
Payer: COMMERCIAL

## 2023-01-10 ENCOUNTER — TELEPHONE (OUTPATIENT)
Dept: CARDIOLOGY CLINIC | Age: 58
End: 2023-01-10

## 2023-01-10 DIAGNOSIS — I50.31 ACUTE HEART FAILURE WITH PRESERVED EJECTION FRACTION (HCC): Primary | ICD-10-CM

## 2023-01-10 DIAGNOSIS — R06.02 SOB (SHORTNESS OF BREATH): ICD-10-CM

## 2023-01-10 LAB — PRO-BNP: 3255 PG/ML

## 2023-01-10 PROCEDURE — 83880 ASSAY OF NATRIURETIC PEPTIDE: CPT

## 2023-01-10 PROCEDURE — 36415 COLL VENOUS BLD VENIPUNCTURE: CPT

## 2023-01-10 RX ORDER — POTASSIUM CHLORIDE 750 MG/1
10 TABLET, EXTENDED RELEASE ORAL 2 TIMES DAILY
Qty: 60 TABLET | Refills: 2 | Status: SHIPPED | OUTPATIENT
Start: 2023-01-10

## 2023-01-10 RX ORDER — FUROSEMIDE 40 MG/1
40 TABLET ORAL 2 TIMES DAILY
Qty: 60 TABLET | Refills: 3 | Status: SHIPPED | OUTPATIENT
Start: 2023-01-10

## 2023-01-10 NOTE — TELEPHONE ENCOUNTER
----- Message from EVE Lanier CNP sent at 1/10/2023  3:59 PM EST -----  Bnp is elevated start lasix 40 mg bid -and potassium  please start tonight -

## 2023-01-10 NOTE — PROGRESS NOTES
BNP noted : 3,255  Has shortness of breath and abd bloating  Start lasix 40 mg bid  with potassium   Re check labs in 2 weeks  OV for follow up

## 2023-01-13 ENCOUNTER — TELEPHONE (OUTPATIENT)
Dept: CARDIOLOGY CLINIC | Age: 58
End: 2023-01-13

## 2023-01-13 NOTE — TELEPHONE ENCOUNTER
Per Marcell Simon NP, patient to repeat bnp with pre op labs. Patient states she is feeling so much better.

## 2023-01-27 ENCOUNTER — HOSPITAL ENCOUNTER (OUTPATIENT)
Age: 58
Discharge: HOME | End: 2023-01-27
Payer: COMMERCIAL

## 2023-01-27 ENCOUNTER — HOSPITAL ENCOUNTER (OUTPATIENT)
Age: 58
Setting detail: SPECIMEN
Discharge: HOME | End: 2023-01-27
Payer: COMMERCIAL

## 2023-01-27 ENCOUNTER — NURSE ONLY (OUTPATIENT)
Dept: CARDIOLOGY CLINIC | Age: 58
End: 2023-01-27

## 2023-01-27 ENCOUNTER — HOSPITAL ENCOUNTER (OUTPATIENT)
Dept: GENERAL RADIOLOGY | Age: 58
Discharge: HOME | End: 2023-01-27
Payer: COMMERCIAL

## 2023-01-27 DIAGNOSIS — I48.91 ATRIAL FIBRILLATION, UNSPECIFIED TYPE (HCC): ICD-10-CM

## 2023-01-27 DIAGNOSIS — Z79.01 LONG TERM (CURRENT) USE OF ANTICOAGULANTS: ICD-10-CM

## 2023-01-27 DIAGNOSIS — I48.0 PAROXYSMAL ATRIAL FIBRILLATION (HCC): Primary | ICD-10-CM

## 2023-01-27 DIAGNOSIS — Z01.810 PRE-OPERATIVE CARDIOVASCULAR EXAMINATION: ICD-10-CM

## 2023-01-27 LAB
ANION GAP SERPL CALCULATED.3IONS-SCNC: 9 MMOL/L (ref 4–16)
APTT: 77.4 SECONDS (ref 25.1–37.1)
BASOPHILS ABSOLUTE: 0 K/CU MM
BASOPHILS RELATIVE PERCENT: 0.5 % (ref 0–1)
BUN BLDV-MCNC: 25 MG/DL (ref 6–23)
CALCIUM SERPL-MCNC: 10 MG/DL (ref 8.3–10.6)
CHLORIDE BLD-SCNC: 100 MMOL/L (ref 99–110)
CO2: 30 MMOL/L (ref 21–32)
CREAT SERPL-MCNC: 1.2 MG/DL (ref 0.6–1.1)
DIFFERENTIAL TYPE: ABNORMAL
EOSINOPHILS ABSOLUTE: 0.1 K/CU MM
EOSINOPHILS RELATIVE PERCENT: 0.8 % (ref 0–3)
GFR SERPL CREATININE-BSD FRML MDRD: 52 ML/MIN/1.73M2
GLUCOSE BLD-MCNC: 89 MG/DL (ref 70–99)
HCT VFR BLD CALC: 53.3 % (ref 37–47)
HEMOGLOBIN: 16.7 GM/DL (ref 12.5–16)
IMMATURE NEUTROPHIL %: 0.5 % (ref 0–0.43)
INR BLD: 1.96 INDEX
LYMPHOCYTES ABSOLUTE: 2.5 K/CU MM
LYMPHOCYTES RELATIVE PERCENT: 38.6 % (ref 24–44)
MAGNESIUM: 2.6 MG/DL (ref 1.8–2.4)
MCH RBC QN AUTO: 28.6 PG (ref 27–31)
MCHC RBC AUTO-ENTMCNC: 31.3 % (ref 32–36)
MCV RBC AUTO: 91.4 FL (ref 78–100)
MONOCYTES ABSOLUTE: 0.6 K/CU MM
MONOCYTES RELATIVE PERCENT: 9.6 % (ref 0–4)
NUCLEATED RBC %: 0 %
PDW BLD-RTO: 13.1 % (ref 11.7–14.9)
PHOSPHORUS: 3.4 MG/DL (ref 2.5–4.9)
PLATELET # BLD: 305 K/CU MM (ref 140–440)
PMV BLD AUTO: 9.9 FL (ref 7.5–11.1)
POTASSIUM SERPL-SCNC: 4.1 MMOL/L (ref 3.5–5.1)
PROTHROMBIN TIME: 25.4 SECONDS (ref 11.7–14.5)
RBC # BLD: 5.83 M/CU MM (ref 4.2–5.4)
SARS-COV-2: NOT DETECTED
SEGMENTED NEUTROPHILS ABSOLUTE COUNT: 3.2 K/CU MM
SEGMENTED NEUTROPHILS RELATIVE PERCENT: 50 % (ref 36–66)
SODIUM BLD-SCNC: 139 MMOL/L (ref 135–145)
SOURCE: NORMAL
TOTAL IMMATURE NEUTOROPHIL: 0.03 K/CU MM
TOTAL NUCLEATED RBC: 0 K/CU MM
WBC # BLD: 6.3 K/CU MM (ref 4–10.5)

## 2023-01-27 PROCEDURE — 85610 PROTHROMBIN TIME: CPT

## 2023-01-27 PROCEDURE — 71046 X-RAY EXAM CHEST 2 VIEWS: CPT

## 2023-01-27 PROCEDURE — 84100 ASSAY OF PHOSPHORUS: CPT

## 2023-01-27 PROCEDURE — U0003 INFECTIOUS AGENT DETECTION BY NUCLEIC ACID (DNA OR RNA); SEVERE ACUTE RESPIRATORY SYNDROME CORONAVIRUS 2 (SARS-COV-2) (CORONAVIRUS DISEASE [COVID-19]), AMPLIFIED PROBE TECHNIQUE, MAKING USE OF HIGH THROUGHPUT TECHNOLOGIES AS DESCRIBED BY CMS-2020-01-R: HCPCS

## 2023-01-27 PROCEDURE — 85025 COMPLETE CBC W/AUTO DIFF WBC: CPT

## 2023-01-27 PROCEDURE — 36415 COLL VENOUS BLD VENIPUNCTURE: CPT

## 2023-01-27 PROCEDURE — 80048 BASIC METABOLIC PNL TOTAL CA: CPT

## 2023-01-27 PROCEDURE — 85730 THROMBOPLASTIN TIME PARTIAL: CPT

## 2023-01-27 PROCEDURE — U0005 INFEC AGEN DETEC AMPLI PROBE: HCPCS

## 2023-01-27 PROCEDURE — 83735 ASSAY OF MAGNESIUM: CPT

## 2023-01-27 NOTE — PROGRESS NOTES
Patient here in office and educated on A fib/flutter ablation, schedule for 2/1/2023 @ 0730, with arrival @ [phone], @ [facility]; risk explained; and consents signed. Also copy of orders given for labs and CXR due 1/27/2023 at [facility]. Instruction given to patient to :  NPO after midnight the night before procedure; call hospital at [phone] to pre-register. May take rest of morning meds of procedure. Patient voiced understanding. Copies of consent & info scanned in chart. Patient performed COVID throat swab for 10 seconds  Patient was wearing a mask  This CMA, LPN, RN present in the room, 6 feet away. Patient dropped swab in  labeled collection tube. Lab order, facesheet and copy of insurance card placed in collection bag. Bag placed in biohazard bag and placed in fridge.  called for .

## 2023-01-30 ENCOUNTER — TELEPHONE (OUTPATIENT)
Dept: CARDIOLOGY CLINIC | Age: 58
End: 2023-01-30

## 2023-01-30 NOTE — TELEPHONE ENCOUNTER
Patient is requesting an answer to the CT/CTA before her procedure . She called imaging center and there are no orders. Needs a call back asap.

## 2023-01-31 ENCOUNTER — ANESTHESIA EVENT (OUTPATIENT)
Dept: CARDIAC CATH/INVASIVE PROCEDURES | Age: 58
End: 2023-01-31
Payer: COMMERCIAL

## 2023-01-31 NOTE — ANESTHESIA PRE PROCEDURE
Department of Anesthesiology  Preprocedure Note       Name:  Maite Kimball   Age:  59 y.o.  :  1965                                          MRN:  [id number]         Date:  2023      Surgeon: * Surgery not found *    Procedure:     Medications prior to admission:   Prior to Admission medications    Medication Sig Start Date End Date Taking? Authorizing Provider   furosemide (LASIX) 40 MG tablet Take 1 tablet by mouth 2 times daily 1/10/23   EVE Arroyo CNP   potassium chloride (KLOR-CON M) 10 MEQ extended release tablet Take 1 tablet by mouth 2 times daily 1/10/23   EVE Arroyo CNP   metoprolol tartrate (LOPRESSOR) 25 MG tablet Take 0.5 tablets by mouth 2 times daily 22   Gwendolyn Zabala MD   rivaroxaban (XARELTO) 20 MG TABS tablet Take 1 tablet by mouth daily (with breakfast) 22   Gwendolyn Zablaa MD   fenofibrate (TRICOR) 145 MG tablet Take 1 tablet by mouth daily 10/10/22   Gwendolyn Zabala MD   propafenone (RYTHMOL) 150 MG tablet TAKE 1 AND 1/2 TABLETS BY MOUTH EVERY 8 HOURS  Patient taking differently: 150 mg  in the morning and 150 mg at noon and 150 mg in the evening. 1 tablet every 8 hours.  22   EVE Arroyo CNP   Compression Stockings MISC by Does not apply route Moderate Compression level  Patient taking differently: by Does not apply route Moderate Compression level- wears occasionally 16   Shellie Kearney MD   FISH OIL by Does not apply route daily     Historical Provider, MD       Current medications:    Current Outpatient Medications   Medication Sig Dispense Refill    furosemide (LASIX) 40 MG tablet Take 1 tablet by mouth 2 times daily 60 tablet 3    potassium chloride (KLOR-CON M) 10 MEQ extended release tablet Take 1 tablet by mouth 2 times daily 60 tablet 2    metoprolol tartrate (LOPRESSOR) 25 MG tablet Take 0.5 tablets by mouth 2 times daily 60 tablet 0    rivaroxaban (XARELTO) 20 MG TABS tablet Take 1 tablet by mouth daily (with breakfast) 90 tablet 1    fenofibrate (TRICOR) 145 MG tablet Take 1 tablet by mouth daily 90 tablet 3    propafenone (RYTHMOL) 150 MG tablet TAKE 1 AND 1/2 TABLETS BY MOUTH EVERY 8 HOURS (Patient taking differently: 150 mg  in the morning and 150 mg at noon and 150 mg in the evening. 1 tablet every 8 hours.) 135 tablet 5    Compression Stockings MISC by Does not apply route Moderate Compression level (Patient taking differently: by Does not apply route Moderate Compression level- wears occasionally) 2 each 0    FISH OIL by Does not apply route daily        No current facility-administered medications for this encounter. Allergies: Allergies   Allergen Reactions    Cozaar [Losartan Potassium] Other (See Comments)     Cough    Lisinopril Other (See Comments)     Cough    Pcn [Penicillins] Other (See Comments)     Unknown as a child think a rash       Problem List:    Patient Active Problem List   Diagnosis Code    Hyperlipidemia E78.5    Obesity, morbid, BMI 40.0-49.9 ([facility] E66.01    H/O echocardiogram S98.45    Metabolic syndrome A93.68    Venous stasis dermatitis of both lower extremities I87.2    Dependent edema R60.9    Atrial fibrillation with RVR ([facility]) I48.91    Essential hypertension I10    PAF (paroxysmal atrial fibrillation) ([facility]) I48.0    MARLINE treated with BiPAP G47.33    Neck pain on left side M54.2    Cervical radiculopathy M54.12    Left lateral epicondylitis M77.12    Anticoagulated Z79.01    Colon polyps K63.5    VHD (valvular heart disease) I38       Past Medical History:        Diagnosis Date    Atrial fibrillation with RVR ([facility]) 07/01/2017    Bell palsy     right side of face affected    H/O 24 hour EKG monitoring 03/30/2010    holter: other then an episode of sinus tach at 150 bpm no other clinically significant arrhythmia seen.   Symptoms reported in pts diary do not correlate with this episode    H/O cardiovascular stress test 01/14/2010    Joe protocol: normal stress study. gated study shows uniform wall motion with calculated LV EF of over 67 %    H/O echocardiogram 2010    ECHO:  There is no comparison study available. There is mild concentric LV hypertrophy. LVSF is normal, EF = > 55 %    H/O echocardiogram 2014    EF55%, borderline left atrial enlargement, normal LV systolic function, mild mitral regurg, no pericardial effusion.  H/O echocardiogram 2017    EF55%  AV mild  sclerotic    H/O echocardiogram 2018    EF55-60% sclerotic non stenosed AV    H/O transesophageal echocardiography (ANA) for monitoring 2022    Calcification noted on the non-coronary cusp of the aortic valve.  MILD MR.    Heart palpitations     History of cardiac monitoring 2014    Event - no clinically significant arrythmias    History of cardiac monitoring 08/10/2017    No clinically signifacant arrythmias    History of complete ECG 2010    HX OTHER MEDICAL     \"not sure if have sleep apnea- have cpap but the last sleep study not conclusive so having another sleep study done at [facility] a cpap but never offically dx with sleep apnea\"    Hyperlipidemia 2010    Hypertension     follows with Dr Charlean Kawasaki        Past Surgical History:        Procedure Laterality Date   [address]    times 2    COLONOSCOPY  2016    COLONOSCOPY N/A 11/15/2018    COLONOSCOPY POLYPECTOMY ABLATION/SNARE/HEMOCLIPS X4 TO PROXIMAL ASCENDING POLYPECTOMY SITE performed by Carmelina Osman MD at [facility], DIAGNOSTIC  2000    EYE SURGERY  2016    gavino cataract ext    ROTATOR CUFF REPAIR Right 10/2012       Social History:    Social History     Tobacco Use    Smoking status: Former     Packs/day: 0.50     Years: 30.00     Pack years: 15.00     Types: Cigarettes     Quit date: 2002     Years since quittin.6    Smokeless tobacco: Never   Substance Use Topics    Alcohol use: No     Comment: NO CAFFEINE                                Counseling given: Not Answered      Vital Signs (Current): There were no vitals filed for this visit. BP Readings from Last 3 Encounters:   12/29/22 136/70   12/21/22 138/88   12/20/22 (!) 160/90       NPO Status:                                                                                 BMI:   Wt Readings from Last 3 Encounters:   12/29/22 (!) 301 lb (136.5 kg)   12/21/22 300 lb (136.1 kg)   12/20/22 (!) 300 lb 9.6 oz (136.4 kg)     There is no height or weight on file to calculate BMI.    CBC:   Lab Results   Component Value Date/Time    WBC 6.3 01/27/2023 11:36 AM    RBC 5.83 01/27/2023 11:36 AM    HGB 16.7 01/27/2023 11:36 AM    HCT 53.3 01/27/2023 11:36 AM    MCV 91.4 01/27/2023 11:36 AM    RDW 13.1 01/27/2023 11:36 AM     01/27/2023 11:36 AM       CMP:   Lab Results   Component Value Date/Time     01/27/2023 11:36 AM    K 4.1 01/27/2023 11:36 AM     01/27/2023 11:36 AM    CO2 30 01/27/2023 11:36 AM    BUN 25 01/27/2023 11:36 AM    CREATININE 1.2 01/27/2023 11:36 AM    GFRAA >60 03/09/2022 11:03 AM    AGRATIO 2.3 04/10/2018 11:40 AM    LABGLOM 52 01/27/2023 11:36 AM    GLUCOSE 89 01/27/2023 11:36 AM    PROT 6.8 01/20/2020 09:23 AM    CALCIUM 10.0 01/27/2023 11:36 AM    BILITOT 0.4 01/20/2020 09:23 AM    ALKPHOS 33 01/20/2020 09:23 AM    AST 19 01/20/2020 09:23 AM    ALT 15 01/20/2020 09:23 AM       POC Tests: No results for input(s): POCGLU, POCNA, POCK, POCCL, POCBUN, POCHEMO, POCHCT in the last 72 hours.     Coags:   Lab Results   Component Value Date/Time    PROTIME 25.4 01/27/2023 11:36 AM    INR 1.96 01/27/2023 11:36 AM    APTT 77.4 01/27/2023 11:36 AM       HCG (If Applicable):   Lab Results   Component Value Date    PREGTESTUR NEGATIVE 11/15/2018        ABGs: No results found for: PHART, PO2ART, SWH6TQC, VVU0YYR, BEART, C0AILBFQ     Type & Screen (If Applicable):  No results found for: LABABO, [address]    Drug/Infectious Status (If Applicable):  No results found for: HIV, HEPCAB    COVID-19 Screening (If Applicable):   Lab Results   Component Value Date/Time    COVID19 NOT DETECTED 2023 09:53 AM           Anesthesia Evaluation  Patient summary reviewed no history of anesthetic complications:   Airway: Mallampati: II          Dental: normal exam         Pulmonary:normal exam    (+) sleep apnea: on CPAP,                            ROS comment: Smoking Status: Former - 15 pack years  Quit Smokin02       Cardiovascular:  Exercise tolerance: poor (<4 METS),   (+) hypertension:, dysrhythmias: atrial fibrillation, hyperlipidemia         Beta Blocker:  Dose within 24 Hrs      ROS comment: Echo 2022:  Summary   No evidence of thrombus within the left atrial appendage. Negative bubble study; no ASD or PFO noted. Calcification noted on the non-coronary cusp of the aortic valve. Mild mitral regurgitation. There is no evidence of any pericardial effusion. Neuro/Psych:   Negative Neuro/Psych ROS  (+) neuromuscular disease:,             GI/Hepatic/Renal:   (+) morbid obesity          Endo/Other:    (+) blood dyscrasia: anticoagulation therapy:., .                 Abdominal:             Vascular: negative vascular ROS. Other Findings:           Anesthesia Plan      general     ASA 4       Induction: intravenous. arterial line    Anesthetic plan and risks discussed with patient. Plan discussed with CRNA. EVE Del Valle - CRNA   2023         Pre Anesthesia Assessment complete.  Chart reviewed on 2023

## 2023-02-01 ENCOUNTER — APPOINTMENT (OUTPATIENT)
Dept: GENERAL RADIOLOGY | Age: 58
End: 2023-02-01
Attending: INTERNAL MEDICINE
Payer: COMMERCIAL

## 2023-02-01 ENCOUNTER — ANESTHESIA (OUTPATIENT)
Dept: CARDIAC CATH/INVASIVE PROCEDURES | Age: 58
End: 2023-02-01
Payer: COMMERCIAL

## 2023-02-01 ENCOUNTER — HOSPITAL ENCOUNTER (OUTPATIENT)
Dept: CARDIAC CATH/INVASIVE PROCEDURES | Age: 58
Setting detail: OBSERVATION
Discharge: HOME | End: 2023-02-02
Attending: INTERNAL MEDICINE | Admitting: INTERNAL MEDICINE
Payer: COMMERCIAL

## 2023-02-01 PROBLEM — Z86.79 S/P ABLATION OF ATRIAL FIBRILLATION: Status: ACTIVE | Noted: 2023-02-01

## 2023-02-01 PROBLEM — Z98.890 S/P ABLATION OF ATRIAL FIBRILLATION: Status: ACTIVE | Noted: 2023-02-01

## 2023-02-01 LAB
ABO/RH: NORMAL
ACTIVATED CLOTTING TIME, LOW RANGE: 180 SEC
ACTIVATED CLOTTING TIME, LOW RANGE: 182 SEC
ACTIVATED CLOTTING TIME, LOW RANGE: 218 SEC
ACTIVATED CLOTTING TIME, LOW RANGE: >400 SEC
ANTIBODY SCREEN: NEGATIVE
BASE EXCESS MIXED: 1.5 (ref 0–2.3)
BASE EXCESS: ABNORMAL (ref 0–2.4)
CO2: 30 MMOL/L (ref 21–32)
EGFR, POC: 58 ML/MIN/1.73M2
GLUCOSE BLD-MCNC: 116 MG/DL (ref 70–99)
HCO3 ARTERIAL: 28.3 MMOL/L (ref 18–23)
HCT VFR BLD CALC: 50 % (ref 37–47)
HEMOGLOBIN: 17 GM/DL (ref 12.5–16)
LV EF: 55 %
LVEF MODALITY: NORMAL
O2 SATURATION: 100 % (ref 96–97)
PCO2 ARTERIAL: 50.9 MMHG (ref 32–45)
PH BLOOD: 7.35 (ref 7.34–7.45)
PO2 ARTERIAL: 442.3 MMHG (ref 75–100)
POC CALCIUM: 1.21 MMOL/L (ref 1.12–1.32)
POC CHLORIDE: 107 MMOL/L (ref 98–109)
POC CREATININE: 1.1 MG/DL (ref 0.6–1.1)
POTASSIUM SERPL-SCNC: 4 MMOL/L (ref 3.5–4.5)
SODIUM BLD-SCNC: 144 MMOL/L (ref 138–146)
SOURCE, BLOOD GAS: ABNORMAL

## 2023-02-01 PROCEDURE — 2580000003 HC RX 258: Performed by: NURSE PRACTITIONER

## 2023-02-01 PROCEDURE — G0378 HOSPITAL OBSERVATION PER HR: HCPCS

## 2023-02-01 PROCEDURE — 2500000003 HC RX 250 WO HCPCS

## 2023-02-01 PROCEDURE — 6360000002 HC RX W HCPCS

## 2023-02-01 PROCEDURE — 2700000000 HC OXYGEN THERAPY PER DAY

## 2023-02-01 PROCEDURE — 82962 GLUCOSE BLOOD TEST: CPT

## 2023-02-01 PROCEDURE — 6360000002 HC RX W HCPCS: Performed by: NURSE PRACTITIONER

## 2023-02-01 PROCEDURE — 93312 ECHO TRANSESOPHAGEAL: CPT

## 2023-02-01 PROCEDURE — 71045 X-RAY EXAM CHEST 1 VIEW: CPT

## 2023-02-01 PROCEDURE — 94761 N-INVAS EAR/PLS OXIMETRY MLT: CPT

## 2023-02-01 PROCEDURE — 93308 TTE F-UP OR LMTD: CPT

## 2023-02-01 PROCEDURE — 85347 COAGULATION TIME ACTIVATED: CPT

## 2023-02-01 PROCEDURE — 3700000001 HC ADD 15 MINUTES (ANESTHESIA)

## 2023-02-01 PROCEDURE — 86900 BLOOD TYPING SEROLOGIC ABO: CPT

## 2023-02-01 PROCEDURE — 85018 HEMOGLOBIN: CPT

## 2023-02-01 PROCEDURE — 93656 COMPRE EP EVAL ABLTJ ATR FIB: CPT

## 2023-02-01 PROCEDURE — 86850 RBC ANTIBODY SCREEN: CPT

## 2023-02-01 PROCEDURE — 3700000000 HC ANESTHESIA ATTENDED CARE

## 2023-02-01 PROCEDURE — 2580000003 HC RX 258

## 2023-02-01 PROCEDURE — 93005 ELECTROCARDIOGRAM TRACING: CPT | Performed by: NURSE PRACTITIONER

## 2023-02-01 PROCEDURE — 86901 BLOOD TYPING SEROLOGIC RH(D): CPT

## 2023-02-01 PROCEDURE — 7100000001 HC PACU RECOVERY - ADDTL 15 MIN

## 2023-02-01 PROCEDURE — 82803 BLOOD GASES ANY COMBINATION: CPT

## 2023-02-01 PROCEDURE — 7100000000 HC PACU RECOVERY - FIRST 15 MIN

## 2023-02-01 PROCEDURE — 80051 ELECTROLYTE PANEL: CPT

## 2023-02-01 PROCEDURE — 85014 HEMATOCRIT: CPT

## 2023-02-01 PROCEDURE — 93312 ECHO TRANSESOPHAGEAL: CPT | Performed by: INTERNAL MEDICINE

## 2023-02-01 PROCEDURE — 6370000000 HC RX 637 (ALT 250 FOR IP): Performed by: NURSE PRACTITIONER

## 2023-02-01 PROCEDURE — 82565 ASSAY OF CREATININE: CPT

## 2023-02-01 PROCEDURE — 93325 DOPPLER ECHO COLOR FLOW MAPG: CPT | Performed by: INTERNAL MEDICINE

## 2023-02-01 PROCEDURE — 93656 COMPRE EP EVAL ABLTJ ATR FIB: CPT | Performed by: INTERNAL MEDICINE

## 2023-02-01 RX ORDER — PROTAMINE SULFATE 10 MG/ML
INJECTION, SOLUTION INTRAVENOUS PRN
Status: DISCONTINUED | OUTPATIENT
Start: 2023-02-01 | End: 2023-02-01 | Stop reason: SDUPTHER

## 2023-02-01 RX ORDER — DROPERIDOL 2.5 MG/ML
0.62 INJECTION, SOLUTION INTRAMUSCULAR; INTRAVENOUS EVERY 10 MIN PRN
Status: DISCONTINUED | OUTPATIENT
Start: 2023-02-01 | End: 2023-02-02 | Stop reason: HOSPADM

## 2023-02-01 RX ORDER — PANTOPRAZOLE SODIUM 40 MG/1
40 TABLET, DELAYED RELEASE ORAL
Status: DISCONTINUED | OUTPATIENT
Start: 2023-02-01 | End: 2023-02-02 | Stop reason: HOSPADM

## 2023-02-01 RX ORDER — AMIODARONE HYDROCHLORIDE 50 MG/ML
INJECTION, SOLUTION INTRAVENOUS PRN
Status: DISCONTINUED | OUTPATIENT
Start: 2023-02-01 | End: 2023-02-01 | Stop reason: SDUPTHER

## 2023-02-01 RX ORDER — ROCURONIUM BROMIDE 10 MG/ML
INJECTION, SOLUTION INTRAVENOUS PRN
Status: DISCONTINUED | OUTPATIENT
Start: 2023-02-01 | End: 2023-02-01 | Stop reason: SDUPTHER

## 2023-02-01 RX ORDER — ACETAMINOPHEN 325 MG/1
650 TABLET ORAL EVERY 4 HOURS PRN
Status: DISCONTINUED | OUTPATIENT
Start: 2023-02-01 | End: 2023-02-02 | Stop reason: HOSPADM

## 2023-02-01 RX ORDER — HYDRALAZINE HYDROCHLORIDE 20 MG/ML
10 INJECTION INTRAMUSCULAR; INTRAVENOUS
Status: DISCONTINUED | OUTPATIENT
Start: 2023-02-01 | End: 2023-02-02 | Stop reason: HOSPADM

## 2023-02-01 RX ORDER — SODIUM CHLORIDE 9 MG/ML
INJECTION, SOLUTION INTRAVENOUS CONTINUOUS PRN
Status: DISCONTINUED | OUTPATIENT
Start: 2023-02-01 | End: 2023-02-01 | Stop reason: SDUPTHER

## 2023-02-01 RX ORDER — SODIUM CHLORIDE 0.9 % (FLUSH) 0.9 %
5-40 SYRINGE (ML) INJECTION PRN
Status: DISCONTINUED | OUTPATIENT
Start: 2023-02-01 | End: 2023-02-02 | Stop reason: HOSPADM

## 2023-02-01 RX ORDER — DEXAMETHASONE SODIUM PHOSPHATE 4 MG/ML
INJECTION, SOLUTION INTRA-ARTICULAR; INTRALESIONAL; INTRAMUSCULAR; INTRAVENOUS; SOFT TISSUE PRN
Status: DISCONTINUED | OUTPATIENT
Start: 2023-02-01 | End: 2023-02-01 | Stop reason: SDUPTHER

## 2023-02-01 RX ORDER — POTASSIUM CHLORIDE 20 MEQ/1
10 TABLET, EXTENDED RELEASE ORAL 2 TIMES DAILY
Status: DISCONTINUED | OUTPATIENT
Start: 2023-02-01 | End: 2023-02-02 | Stop reason: HOSPADM

## 2023-02-01 RX ORDER — ONDANSETRON 2 MG/ML
4 INJECTION INTRAMUSCULAR; INTRAVENOUS
Status: ACTIVE | OUTPATIENT
Start: 2023-02-01 | End: 2023-02-02

## 2023-02-01 RX ORDER — FENOFIBRATE 54 MG/1
145 TABLET ORAL DAILY
Status: DISCONTINUED | OUTPATIENT
Start: 2023-02-01 | End: 2023-02-02 | Stop reason: HOSPADM

## 2023-02-01 RX ORDER — DIPHENHYDRAMINE HYDROCHLORIDE 50 MG/ML
12.5 INJECTION INTRAMUSCULAR; INTRAVENOUS
Status: ACTIVE | OUTPATIENT
Start: 2023-02-01 | End: 2023-02-02

## 2023-02-01 RX ORDER — LABETALOL HYDROCHLORIDE 5 MG/ML
INJECTION, SOLUTION INTRAVENOUS PRN
Status: DISCONTINUED | OUTPATIENT
Start: 2023-02-01 | End: 2023-02-01 | Stop reason: SDUPTHER

## 2023-02-01 RX ORDER — SODIUM CHLORIDE 9 MG/ML
INJECTION, SOLUTION INTRAVENOUS PRN
Status: DISCONTINUED | OUTPATIENT
Start: 2023-02-01 | End: 2023-02-02 | Stop reason: HOSPADM

## 2023-02-01 RX ORDER — SODIUM CHLORIDE, SODIUM LACTATE, POTASSIUM CHLORIDE, CALCIUM CHLORIDE 600; 310; 30; 20 MG/100ML; MG/100ML; MG/100ML; MG/100ML
INJECTION, SOLUTION INTRAVENOUS CONTINUOUS
Status: DISCONTINUED | OUTPATIENT
Start: 2023-02-01 | End: 2023-02-02 | Stop reason: HOSPADM

## 2023-02-01 RX ORDER — FENTANYL CITRATE 50 UG/ML
25 INJECTION, SOLUTION INTRAMUSCULAR; INTRAVENOUS EVERY 5 MIN PRN
Status: DISCONTINUED | OUTPATIENT
Start: 2023-02-01 | End: 2023-02-02 | Stop reason: HOSPADM

## 2023-02-01 RX ORDER — SODIUM CHLORIDE 0.9 % (FLUSH) 0.9 %
5-40 SYRINGE (ML) INJECTION EVERY 12 HOURS SCHEDULED
Status: DISCONTINUED | OUTPATIENT
Start: 2023-02-01 | End: 2023-02-02 | Stop reason: HOSPADM

## 2023-02-01 RX ORDER — OXYCODONE HYDROCHLORIDE 5 MG/1
5 TABLET ORAL
Status: DISPENSED | OUTPATIENT
Start: 2023-02-01 | End: 2023-02-02

## 2023-02-01 RX ORDER — PROPOFOL 10 MG/ML
INJECTION, EMULSION INTRAVENOUS PRN
Status: DISCONTINUED | OUTPATIENT
Start: 2023-02-01 | End: 2023-02-01 | Stop reason: SDUPTHER

## 2023-02-01 RX ORDER — IPRATROPIUM BROMIDE AND ALBUTEROL SULFATE 2.5; .5 MG/3ML; MG/3ML
1 SOLUTION RESPIRATORY (INHALATION)
Status: DISCONTINUED | OUTPATIENT
Start: 2023-02-01 | End: 2023-02-02

## 2023-02-01 RX ORDER — FENTANYL CITRATE 50 UG/ML
INJECTION, SOLUTION INTRAMUSCULAR; INTRAVENOUS PRN
Status: DISCONTINUED | OUTPATIENT
Start: 2023-02-01 | End: 2023-02-01 | Stop reason: SDUPTHER

## 2023-02-01 RX ORDER — LIDOCAINE HYDROCHLORIDE 20 MG/ML
INJECTION, SOLUTION INFILTRATION; PERINEURAL PRN
Status: DISCONTINUED | OUTPATIENT
Start: 2023-02-01 | End: 2023-02-01 | Stop reason: SDUPTHER

## 2023-02-01 RX ORDER — PROPAFENONE HYDROCHLORIDE 150 MG/1
150 TABLET, FILM COATED ORAL EVERY 8 HOURS
Status: CANCELLED | OUTPATIENT
Start: 2023-02-01

## 2023-02-01 RX ORDER — MEPERIDINE HYDROCHLORIDE 25 MG/ML
12.5 INJECTION INTRAMUSCULAR; INTRAVENOUS; SUBCUTANEOUS EVERY 5 MIN PRN
Status: DISCONTINUED | OUTPATIENT
Start: 2023-02-01 | End: 2023-02-02 | Stop reason: HOSPADM

## 2023-02-01 RX ORDER — ONDANSETRON 2 MG/ML
INJECTION INTRAMUSCULAR; INTRAVENOUS PRN
Status: DISCONTINUED | OUTPATIENT
Start: 2023-02-01 | End: 2023-02-01 | Stop reason: SDUPTHER

## 2023-02-01 RX ORDER — FUROSEMIDE 20 MG/1
40 TABLET ORAL 2 TIMES DAILY
Status: DISCONTINUED | OUTPATIENT
Start: 2023-02-01 | End: 2023-02-02 | Stop reason: HOSPADM

## 2023-02-01 RX ORDER — LABETALOL HYDROCHLORIDE 5 MG/ML
10 INJECTION, SOLUTION INTRAVENOUS
Status: DISCONTINUED | OUTPATIENT
Start: 2023-02-01 | End: 2023-02-02 | Stop reason: HOSPADM

## 2023-02-01 RX ADMIN — PHENYLEPHRINE HYDROCHLORIDE 10 MCG/MIN: 10 INJECTION INTRAVENOUS at 09:01

## 2023-02-01 RX ADMIN — PROPOFOL 30 MG: 10 INJECTION, EMULSION INTRAVENOUS at 09:58

## 2023-02-01 RX ADMIN — FENTANYL CITRATE 50 MCG: 50 INJECTION, SOLUTION INTRAMUSCULAR; INTRAVENOUS at 08:52

## 2023-02-01 RX ADMIN — LABETALOL HYDROCHLORIDE 5 MG: 5 INJECTION, SOLUTION INTRAVENOUS at 11:19

## 2023-02-01 RX ADMIN — LABETALOL HYDROCHLORIDE 5 MG: 5 INJECTION, SOLUTION INTRAVENOUS at 11:32

## 2023-02-01 RX ADMIN — SUGAMMADEX 200 MG: 100 INJECTION, SOLUTION INTRAVENOUS at 11:04

## 2023-02-01 RX ADMIN — PROPOFOL 30 MG: 10 INJECTION, EMULSION INTRAVENOUS at 11:35

## 2023-02-01 RX ADMIN — ROCURONIUM BROMIDE 50 MG: 10 INJECTION INTRAVENOUS at 08:52

## 2023-02-01 RX ADMIN — AMIODARONE HYDROCHLORIDE 0.5 MG/MIN: 50 INJECTION, SOLUTION INTRAVENOUS at 23:21

## 2023-02-01 RX ADMIN — LIDOCAINE HYDROCHLORIDE 100 MG: 20 INJECTION, SOLUTION INFILTRATION; PERINEURAL at 08:52

## 2023-02-01 RX ADMIN — PROPOFOL 150 MG: 10 INJECTION, EMULSION INTRAVENOUS at 08:52

## 2023-02-01 RX ADMIN — PHENYLEPHRINE HYDROCHLORIDE 100 MCG: 10 INJECTION INTRAVENOUS at 10:58

## 2023-02-01 RX ADMIN — FENTANYL CITRATE 25 MCG: 50 INJECTION, SOLUTION INTRAMUSCULAR; INTRAVENOUS at 10:01

## 2023-02-01 RX ADMIN — ROCURONIUM BROMIDE 30 MG: 10 INJECTION INTRAVENOUS at 09:48

## 2023-02-01 RX ADMIN — DEXAMETHASONE SODIUM PHOSPHATE 4 MG: 4 INJECTION, SOLUTION INTRAMUSCULAR; INTRAVENOUS at 08:58

## 2023-02-01 RX ADMIN — POTASSIUM CHLORIDE 10 MEQ: 1500 TABLET, EXTENDED RELEASE ORAL at 21:05

## 2023-02-01 RX ADMIN — PROTAMINE SULFATE 10 MG: 10 INJECTION, SOLUTION INTRAVENOUS at 11:13

## 2023-02-01 RX ADMIN — RIVAROXABAN 20 MG: 20 TABLET, FILM COATED ORAL at 17:13

## 2023-02-01 RX ADMIN — ACETAMINOPHEN 650 MG: 325 TABLET ORAL at 21:16

## 2023-02-01 RX ADMIN — PROTAMINE SULFATE 30 MG: 10 INJECTION, SOLUTION INTRAVENOUS at 10:59

## 2023-02-01 RX ADMIN — AMIODARONE HYDROCHLORIDE 1 MG/MIN: 50 INJECTION, SOLUTION INTRAVENOUS at 13:18

## 2023-02-01 RX ADMIN — ONDANSETRON 4 MG: 2 INJECTION INTRAMUSCULAR; INTRAVENOUS at 11:04

## 2023-02-01 RX ADMIN — PANTOPRAZOLE SODIUM 40 MG: 40 TABLET, DELAYED RELEASE ORAL at 17:13

## 2023-02-01 RX ADMIN — AMIODARONE HYDROCHLORIDE 150 MG: 50 INJECTION, SOLUTION INTRAVENOUS at 10:53

## 2023-02-01 RX ADMIN — FENOFIBRATE 135 MG: 54 TABLET ORAL at 17:12

## 2023-02-01 RX ADMIN — FENTANYL CITRATE 25 MCG: 50 INJECTION, SOLUTION INTRAMUSCULAR; INTRAVENOUS at 09:56

## 2023-02-01 RX ADMIN — ROCURONIUM BROMIDE 20 MG: 10 INJECTION INTRAVENOUS at 09:56

## 2023-02-01 RX ADMIN — METOPROLOL TARTRATE 12.5 MG: 25 TABLET, FILM COATED ORAL at 21:05

## 2023-02-01 RX ADMIN — SODIUM CHLORIDE: 900 INJECTION INTRAVENOUS at 08:41

## 2023-02-01 RX ADMIN — LABETALOL HYDROCHLORIDE 5 MG: 5 INJECTION, SOLUTION INTRAVENOUS at 11:28

## 2023-02-01 RX ADMIN — FUROSEMIDE 40 MG: 20 TABLET ORAL at 17:13

## 2023-02-01 RX ADMIN — PHENYLEPHRINE HYDROCHLORIDE 200 MCG: 10 INJECTION INTRAVENOUS at 10:56

## 2023-02-01 RX ADMIN — LABETALOL HYDROCHLORIDE 5 MG: 5 INJECTION, SOLUTION INTRAVENOUS at 11:24

## 2023-02-01 ASSESSMENT — PAIN SCALES - GENERAL
PAINLEVEL_OUTOF10: 5
PAINLEVEL_OUTOF10: 0
PAINLEVEL_OUTOF10: 3

## 2023-02-01 ASSESSMENT — PAIN DESCRIPTION - DESCRIPTORS
DESCRIPTORS: ACHING
DESCRIPTORS: ACHING;SORE

## 2023-02-01 ASSESSMENT — PAIN DESCRIPTION - ORIENTATION: ORIENTATION: LOWER;RIGHT;LEFT

## 2023-02-01 ASSESSMENT — PAIN - FUNCTIONAL ASSESSMENT
PAIN_FUNCTIONAL_ASSESSMENT: ACTIVITIES ARE NOT PREVENTED
PAIN_FUNCTIONAL_ASSESSMENT: ACTIVITIES ARE NOT PREVENTED

## 2023-02-01 ASSESSMENT — PAIN DESCRIPTION - FREQUENCY
FREQUENCY: CONTINUOUS
FREQUENCY: CONTINUOUS

## 2023-02-01 ASSESSMENT — PAIN DESCRIPTION - PAIN TYPE
TYPE: CHRONIC PAIN;OTHER (COMMENT)
TYPE: ACUTE PAIN

## 2023-02-01 ASSESSMENT — PAIN DESCRIPTION - LOCATION
LOCATION: BACK;HIP
LOCATION: HEAD

## 2023-02-01 ASSESSMENT — PAIN DESCRIPTION - ONSET
ONSET: ON-GOING
ONSET: ON-GOING

## 2023-02-01 NOTE — ANESTHESIA POSTPROCEDURE EVALUATION
Department of Anesthesiology  Postprocedure Note    Patient: Maite Kimball  MRN: [id number]  YOB: 1965  Date of evaluation: 2/1/2023      Procedure Summary     Date: 02/01/23 Room / Location: [address] [place] [address] Cath Lab    Anesthesia Start: 3330 Anesthesia Stop: 3781    Procedure: [facility] EP AFIB ABLATION W ANESTH Diagnosis:       Unspecified atrial fibrillation      S/P ablation of atrial fibrillation    Scheduled Providers:  Responsible Provider: Jorge Morillo MD    Anesthesia Type: General ASA Status: 4          Anesthesia Type: General    Zak Phase I: Zak Score: 10    Zak Phase II:        Anesthesia Post Evaluation    Patient location during evaluation: PACU  Patient participation: complete - patient participated  Level of consciousness: awake and alert  Airway patency: patent  Nausea & Vomiting: no nausea and no vomiting  Complications: no  Cardiovascular status: hemodynamically stable  Respiratory status: face mask and spontaneous ventilation  Hydration status: stable

## 2023-02-01 NOTE — PROCEDURES
Alli Lozoya   59 y.o., female  1965      2/1/2023    Attending: Trent Post MD    Sedation : Anesthesia                        Indication:               PRE-ATRIAL FIBRILLATION ABLATION                                                                                                                                           After obtaining the informed cosent. Pt brought to EP lab. Sedation per anesthesia . After proper sedation ANA performed. US Doppler was used to assess abnormalities where appropriate. Post procedure pt is stable.       Findings:  LV=  lvef is normal  LA=  dilated  ZO= NO CLOTS  RV= normal  RA=  normal  IAS= Normal  Bubble study=not done for PFO or ASD  AV= tricuspid, no significant regurgitation or stenosis  MV=mild regurgitation, no stenosis  TV=mild regurgitation  PV= no significant regurgitation,   Aorta= mild stable plaque noted  PUL NOEMI FLOW= in atrial fibrillation    Impression:  No ZO clot    Plan:  Proceed with ablation

## 2023-02-01 NOTE — PLAN OF CARE
Report called to Radhika Jennings RN for patient to transfer to room 3130 once clean.  [facility] 2/1/2023

## 2023-02-01 NOTE — PLAN OF CARE
Received from PACU. Monitor and alarms on. Call Light in reach. Vitals stable and bilateral groins assessed with no bleeding or hematoma noted. Patient offered water to drink at this time.  [address] [place] [address] 2/1/2023

## 2023-02-01 NOTE — H&P
Electrophysiology H&p Note      Reason for consultation: Atrial fibrillation    Chief complaint :  Here for atrial fibrillation ablation    Referring physician:        Primary care physician: No primary care provider on file. History of Present Illness: Today visit (02/01/23)    Patient here for Atrial fibrillation ablation. No change in H&P noted from previous clinic visit. Previous visit (12/29/2022)      Chief Complaint   Patient presents with    Consultation     Mike Evangelista- thinks its due to being overweight  Palpitations- Pt is having palpitations, they are constant but not painful like they were last time she had them  No nicotine, no alcohol  Caffeine- rarely- hardly ever  Exercise- no    Shortness of Breath    Palpitations           Previous visit: (10/27/2017      Chief Complaint   Patient presents with    Atrial Fibrillation     Pt states palpitations happen rarely. Pt denies chest pain, shortness of breath, dizziness, and edema. Previous visit:    Chief Complaint   Patient presents with    Atrial Fibrillation     MO-discuss afib ablation. Attended class on 7/14. Patient denies CP, dizziness, edema. She does report palpitations constantly as well as SOB at times due to the palpitations. Patient is still wearing event monitor, it is due to come off tomorrow. Patient was in the hospital in Atrial fibrillation in July. Denies syncope  On medications last few days she has been much better. Patient has symptoms of sleep apnea - snoring - tired when awake and sleeps in the day. Past medical history:   Past Medical History:   Diagnosis Date    Atrial fibrillation with RVR ([address] 07/01/2017    Bell palsy     right side of face affected    H/O 24 hour EKG monitoring 03/30/2010    holter: other then an episode of sinus tach at 150 bpm no other clinically significant arrhythmia seen.   Symptoms reported in pts diary do not correlate with this episode    H/O cardiovascular stress test 01/14/2010    Joe protocol: normal stress study. gated study shows uniform wall motion with calculated LV EF of over 67 %    H/O echocardiogram 05/20/2010    ECHO:  There is no comparison study available. There is mild concentric LV hypertrophy. LVSF is normal, EF = > 55 %    H/O echocardiogram 07/18/2014    EF55%, borderline left atrial enlargement, normal LV systolic function, mild mitral regurg, no pericardial effusion. H/O echocardiogram 07/03/2017    EF55%  AV mild  sclerotic    H/O echocardiogram 11/07/2018    EF55-60% sclerotic non stenosed AV    H/O transesophageal echocardiography (ANA) for monitoring 12/14/2022    Calcification noted on the non-coronary cusp of the aortic valve. MILD MR.     Heart palpitations     History of cardiac monitoring 07/21/2014    Event - no clinically significant arrythmias    History of cardiac monitoring 08/10/2017    No clinically signifacant arrythmias    History of complete ECG 05/11/2010    HX OTHER MEDICAL     \"not sure if have sleep apnea- have cpap but the last sleep study not conclusive so having another sleep study done at [facility] a cpap but never offically dx with sleep apnea\"    Hyperlipidemia 04/2010    Hypertension     follows with Dr Richard Prado    Obesity        Surgical history :   Past Surgical History:   Procedure Laterality Date    [facility]    times 2    COLONOSCOPY  2016    COLONOSCOPY N/A 11/15/2018    COLONOSCOPY POLYPECTOMY ABLATION/SNARE/HEMOCLIPS X4 TO PROXIMAL ASCENDING POLYPECTOMY SITE performed by Shama Hanson MD at [address] [place] [address], [place], [address] [place] [address]    EYE SURGERY  2016    gavino cataract ext    ROTATOR CUFF REPAIR Right 10/2012       Family history:   Family History   Problem Relation Age of Onset    High Blood Pressure Mother     Cancer Mother         lung ca    Heart Disease Father     Heart Surgery Father         cabg x 2    Other Father AAA    Diabetes Father     Heart Attack Brother     Breast Cancer Neg Hx        Social history :  reports that she quit smoking about 20 years ago. Her smoking use included cigarettes. She has a 15.00 pack-year smoking history. She has never used smokeless tobacco. She reports that she does not drink alcohol and does not use drugs. Allergies   Allergen Reactions    Cozaar [Losartan Potassium] Other (See Comments)     Cough    Lisinopril Other (See Comments)     Cough    Pcn [Penicillins] Other (See Comments)     Unknown as a child think a rash       No current facility-administered medications on file prior to encounter. Current Outpatient Medications on File Prior to Encounter   Medication Sig Dispense Refill    furosemide (LASIX) 40 MG tablet Take 1 tablet by mouth 2 times daily 60 tablet 3    potassium chloride (KLOR-CON M) 10 MEQ extended release tablet Take 1 tablet by mouth 2 times daily 60 tablet 2    metoprolol tartrate (LOPRESSOR) 25 MG tablet Take 0.5 tablets by mouth 2 times daily 60 tablet 0    rivaroxaban (XARELTO) 20 MG TABS tablet Take 1 tablet by mouth daily (with breakfast) 90 tablet 1    fenofibrate (TRICOR) 145 MG tablet Take 1 tablet by mouth daily 90 tablet 3    propafenone (RYTHMOL) 150 MG tablet TAKE 1 AND 1/2 TABLETS BY MOUTH EVERY 8 HOURS (Patient taking differently: 150 mg  in the morning and 150 mg at noon and 150 mg in the evening. 1 tablet every 8 hours.) 135 tablet 5    Compression Stockings MISC by Does not apply route Moderate Compression level (Patient taking differently: by Does not apply route Moderate Compression level- wears occasionally) 2 each 0    FISH OIL by Does not apply route daily          Review of Systems:   Review of Systems   Constitutional: Positive for fatigue. Negative for activity change, chills and fever. HENT: Negative for congestion, ear pain and tinnitus. Eyes: Negative for photophobia, pain and visual disturbance.    Respiratory:  Negative for cough, chest tightness and wheezing. Cardiovascular: Positive for palpitations. Negative for chest pain and leg swelling. Shortness of breath with exertion  Gastrointestinal: Negative for abdominal pain, blood in stool, constipation, diarrhea, nausea and vomiting. Endocrine: Negative for cold intolerance and heat intolerance. Genitourinary: Negative for dysuria, flank pain and hematuria. Musculoskeletal: Negative for arthralgias, back pain, myalgias and neck stiffness. Skin: Negative for color change and rash. Allergic/Immunologic: Negative for food allergies. Neurological: Negative for dizziness, light-headedness, numbness and headaches. Hematological: Does not bruise/bleed easily. Psychiatric/Behavioral: Negative for agitation, behavioral problems and confusion. Physical Examination:    BP (!) 143/99   Pulse (!) 142   Temp 96.8 °F (36 °C) (Temporal)   Resp 21   Ht 5' 5\" (1.651 m)   Wt 290 lb (131.5 kg)   LMP 09/19/2018   SpO2 100%   BMI 48.26 kg/m²    Wt Readings from Last 3 Encounters:   02/01/23 290 lb (131.5 kg)   12/29/22 (!) 301 lb (136.5 kg)   12/21/22 300 lb (136.1 kg)     Body mass index is 48.26 kg/m². Physical Exam   Constitutional: She is oriented to person, place, and time and well-developed, well-nourished, and in no distress. HENT:   Head: Normocephalic and atraumatic. Eyes: Conjunctivae and EOM are normal. Pupils are equal, round, and reactive to light. Right eye exhibits no discharge. Neck: Normal range of motion. No JVD present. No thyromegaly present. Cardiovascular: Normal rate, regular rhythm and normal heart sounds. Exam reveals no friction rub. No murmur heard. Pulmonary/Chest: Effort normal and breath sounds normal. No stridor. No respiratory distress. She has no wheezes. Abdominal: Soft. Bowel sounds are normal. She exhibits no distension. There is no tenderness. Musculoskeletal: Normal range of motion.  She exhibits no edema or tenderness. Neurological: She is alert and oriented to person, place, and time. She displays normal reflexes. No cranial nerve deficit. Coordination normal.   Skin: Skin is warm and dry. No rash noted. No erythema. Psychiatric: Mood and affect normal.             CBC:   Lab Results   Component Value Date/Time    WBC 6.3 01/27/2023 11:36 AM    HGB 16.7 01/27/2023 11:36 AM    HCT 53.3 01/27/2023 11:36 AM     01/27/2023 11:36 AM     Lipids:   Lab Results   Component Value Date    CHOL 155 03/29/2021    TRIG 102 03/29/2021    HDL 39 (L) 03/29/2021    LDLCALC 96 03/29/2021    LDLDIRECT 88 01/20/2020     PT/INR:   Lab Results   Component Value Date/Time    INR 1.96 01/27/2023 11:36 AM        BMP:    Lab Results   Component Value Date     01/27/2023    K 4.1 01/27/2023     01/27/2023    CO2 30 01/27/2023    BUN 25 (H) 01/27/2023     CMP:   Lab Results   Component Value Date    AST 19 07/01/2017    PROT 7.0 07/01/2017    BILITOT 0.4 07/01/2017    ALKPHOS 61 07/01/2017     TSH:    Lab Results   Component Value Date/Time    TSH 3.54 10/26/2016 12:07 PM       EKGINTERPRETATION - EKG Interpretation:  atrial fibrillation    Vitals:    02/01/23 0556   BP: (!) 143/99   Pulse: (!) 142   Resp: 21   Temp: 96.8 °F (36 °C)   TempSrc: Temporal   SpO2: 100%   Weight: 290 lb (131.5 kg)   Height: 5' 5\" (1.651 m)        Repeat /70    IMPRESSION / RECOMMENDATIONS:     1. PAF  2. Morbid obesity BMI 50  3. Sleep apnea  4. HTN - BP elevated but she has not taken her medications today and usually its normal she reports. Last visit to cardiology office her BP was in 263'N systolic  5. hld  6.  Metabolic syndrome      Patient is now in atrial fibrillation  Patient has PAF episodes - on metoprolol, rhythmol and xarelto  Patient HR is in 50's when not in atrial fibrillation  Patient is feeling symptoms  She did really well for some time on medications  Recommend ablation  Discussed risks with procedure    The risks including, but not limited to, vascular injury, bleeding, infection, radiation exposure, injury to cardiac and surrounding structures (including esophageal and pulmonary vein injury), injury to the normal conduction system of the heart (possibly requiring a pacemaker), stroke, myocardial infarction and death were all discussed. The patient considered the risks, benefits and alternatives; decided to proceed with the procedure. Discussed about weight loss and patient is going to bariatrics appt and she is working on Radico. She wants to do diet but not baritric surgery      Thanks again for allowing me to participate in care of this patient. Please call me if you have any questions. With best regards.       Kanu Kmi MD

## 2023-02-02 VITALS
WEIGHT: 290 LBS | SYSTOLIC BLOOD PRESSURE: 115 MMHG | OXYGEN SATURATION: 94 % | RESPIRATION RATE: 18 BRPM | DIASTOLIC BLOOD PRESSURE: 76 MMHG | BODY MASS INDEX: 48.32 KG/M2 | TEMPERATURE: 97.9 F | HEART RATE: 95 BPM | HEIGHT: 65 IN

## 2023-02-02 LAB
EKG ATRIAL RATE: 234 BPM
EKG DIAGNOSIS: NORMAL
EKG Q-T INTERVAL: 382 MS
EKG QRS DURATION: 110 MS
EKG QTC CALCULATION (BAZETT): 490 MS
EKG R AXIS: 58 DEGREES
EKG T AXIS: 73 DEGREES
EKG VENTRICULAR RATE: 99 BPM

## 2023-02-02 PROCEDURE — C1733 CATH, EP, OTHR THAN COOL-TIP: HCPCS

## 2023-02-02 PROCEDURE — 2709999900 HC NON-CHARGEABLE SUPPLY

## 2023-02-02 PROCEDURE — C1769 GUIDE WIRE: HCPCS

## 2023-02-02 PROCEDURE — 99238 HOSP IP/OBS DSCHRG MGMT 30/<: CPT | Performed by: NURSE PRACTITIONER

## 2023-02-02 PROCEDURE — G0378 HOSPITAL OBSERVATION PER HR: HCPCS

## 2023-02-02 PROCEDURE — C1894 INTRO/SHEATH, NON-LASER: HCPCS

## 2023-02-02 PROCEDURE — 93010 ELECTROCARDIOGRAM REPORT: CPT | Performed by: INTERNAL MEDICINE

## 2023-02-02 PROCEDURE — C1759 CATH, INTRA ECHOCARDIOGRAPHY: HCPCS

## 2023-02-02 PROCEDURE — C1732 CATH, EP, DIAG/ABL, 3D/VECT: HCPCS

## 2023-02-02 PROCEDURE — 6370000000 HC RX 637 (ALT 250 FOR IP): Performed by: NURSE PRACTITIONER

## 2023-02-02 PROCEDURE — C1893 INTRO/SHEATH, FIXED,NON-PEEL: HCPCS

## 2023-02-02 PROCEDURE — 99217 PR OBSERVATION CARE DISCHARGE MANAGEMENT: CPT | Performed by: NURSE PRACTITIONER

## 2023-02-02 PROCEDURE — 2720000010 HC SURG SUPPLY STERILE

## 2023-02-02 PROCEDURE — 2580000003 HC RX 258: Performed by: NURSE PRACTITIONER

## 2023-02-02 PROCEDURE — 94761 N-INVAS EAR/PLS OXIMETRY MLT: CPT

## 2023-02-02 RX ORDER — AMIODARONE HYDROCHLORIDE 200 MG/1
200 TABLET ORAL 2 TIMES DAILY
Qty: 28 TABLET | Refills: 0 | Status: SHIPPED | OUTPATIENT
Start: 2023-02-02 | End: 2023-02-16

## 2023-02-02 RX ORDER — AMIODARONE HYDROCHLORIDE 200 MG/1
200 TABLET ORAL DAILY
Qty: 90 TABLET | Refills: 1 | Status: SHIPPED | OUTPATIENT
Start: 2023-02-17

## 2023-02-02 RX ORDER — PANTOPRAZOLE SODIUM 40 MG/1
40 TABLET, DELAYED RELEASE ORAL DAILY
Qty: 30 TABLET | Refills: 0 | Status: SHIPPED | OUTPATIENT
Start: 2023-02-02

## 2023-02-02 RX ORDER — AMIODARONE HYDROCHLORIDE 200 MG/1
200 TABLET ORAL 2 TIMES DAILY
Status: DISCONTINUED | OUTPATIENT
Start: 2023-02-02 | End: 2023-02-02

## 2023-02-02 RX ADMIN — FUROSEMIDE 40 MG: 20 TABLET ORAL at 08:04

## 2023-02-02 RX ADMIN — METOPROLOL TARTRATE 12.5 MG: 25 TABLET, FILM COATED ORAL at 08:04

## 2023-02-02 RX ADMIN — SODIUM CHLORIDE, PRESERVATIVE FREE 10 ML: 5 INJECTION INTRAVENOUS at 08:04

## 2023-02-02 RX ADMIN — FENOFIBRATE 135 MG: 54 TABLET ORAL at 08:03

## 2023-02-02 RX ADMIN — PANTOPRAZOLE SODIUM 40 MG: 40 TABLET, DELAYED RELEASE ORAL at 06:20

## 2023-02-02 RX ADMIN — POTASSIUM CHLORIDE 10 MEQ: 1500 TABLET, EXTENDED RELEASE ORAL at 08:03

## 2023-02-02 NOTE — DISCHARGE SUMMARY
[facility]  Discharge Summary    José Rosas  :  1965  MRN:  [id number]    Admit date:  2023  Discharge date:      Admitting Physician:  David Mancini MD    Discharge Diagnoses:      1. Sp Ablation of  paroxysmal atrial fibrillation        Patient Active Problem List   Diagnosis    Hyperlipidemia    Obesity, morbid, BMI 40.0-49.9 ([address]    H/O echocardiogram    Metabolic syndrome    Venous stasis dermatitis of both lower extremities    Dependent edema    Atrial fibrillation with RVR (HCC)    Essential hypertension    PAF (paroxysmal atrial fibrillation) (HCC)    MARLINE treated with BiPAP    Neck pain on left side    Cervical radiculopathy    Left lateral epicondylitis    Anticoagulated    Colon polyps    VHD (valvular heart disease)    S/P ablation of atrial fibrillation       Admission Condition:  fair    Discharged Condition:  good    Hospital Course:   Patient admitted post ablation of paroxysmal atrial fibrillation for observation. Patient was cardioverted post procedure however it was unsuccessful. Patient was started on amiodarone drip. Propafenone was discontinued. Will plan to rate control for now. Will increase metoprolol to 25 mg BID. Patient to continue amiodarone 200 mg BID for 14 days, then 200 mg daily. Patient may need cardioversion in 1-2 weeks. If patient has bradycardia or sick sinus syndrome post cardioversion patient may need pacemaker. Patient was stable for discharge to be [facility]    Current Discharge Medication List             Details   pantoprazole (PROTONIX) 40 MG tablet Take 1 tablet by mouth daily  Qty: 30 tablet, Refills: 0      !! amiodarone (CORDARONE) 200 MG tablet Take 1 tablet by mouth 2 times daily for 28 doses  Qty: 28 tablet, Refills: 0      !! amiodarone (CORDARONE) 200 MG tablet Take 1 tablet by mouth daily  Qty: 90 tablet, Refills: 1       !! - Potential duplicate medications found. Please discuss with provider.              Details metoprolol tartrate (LOPRESSOR) 25 MG tablet Take 1 tablet by mouth 2 times daily  Qty: 60 tablet, Refills: 1                Details   furosemide (LASIX) 40 MG tablet Take 1 tablet by mouth 2 times daily  Qty: 60 tablet, Refills: 3      potassium chloride (KLOR-CON M) 10 MEQ extended release tablet Take 1 tablet by mouth 2 times daily  Qty: 60 tablet, Refills: 2      rivaroxaban (XARELTO) 20 MG TABS tablet Take 1 tablet by mouth daily (with breakfast)  Qty: 90 tablet, Refills: 1    Associated Diagnoses: Hyperlipidemia, unspecified hyperlipidemia type; Atrial fibrillation, unspecified type (HCC)      fenofibrate (TRICOR) 145 MG tablet Take 1 tablet by mouth daily  Qty: 90 tablet, Refills: 3      Compression Stockings MISC by Does not apply route Moderate Compression level  Qty: 2 each, Refills: 0    Associated Diagnoses: Venous stasis dermatitis of both lower extremities      FISH OIL by Does not apply route daily              Discharge Exam:  BP (!) 118/96   Pulse (!) 104   Temp 97.9 °F (36.6 °C) (Oral)   Resp 18   Ht 5' 5\" (1.651 m)   Wt 290 lb (131.5 kg)   LMP 09/19/2018   SpO2 94%   BMI 48.26 kg/m²   General appearance: alert, appears stated age, and cooperative  Head: Normocephalic, without obvious abnormality, atraumatic  Lungs: clear to auscultation bilaterally  Heart: regular rate and rhythm, S1, S2 normal, no murmur, click, rub or gallop  Extremities: extremities normal, atraumatic, no cyanosis or edema  Pulses: 2+ and symmetric  Skin: Skin color, texture, turgor normal. No rashes or lesions. Bilateral femoral groin sites soft, nontender, no hematoma.    Neurologic: Grossly normal    Disposition:   home    Signed:  EVE Carnes - CNP  2/2/2023, 8:42 AM

## 2023-02-02 NOTE — PROGRESS NOTES
02/02/23 1018   Encounter Summary   Encounter Overview/Reason  Initial Encounter   Service Provided For: Patient and family together   Referral/Consult From: [facility]   Support System Spouse; Children   Last Encounter  02/02/23  (Offered to pray and suport spiritually prior to her surgery.)   Complexity of Encounter Low   Begin Time 1009   End Time  1019   Total Time Calculated 10 min   Encounter    Type Initial Screen/Assessment   Spiritual/Emotional needs   Type Spiritual Support   Grief, Loss, and Adjustments   Type Adjustment to illness   Assessment/Intervention/Outcome   Assessment Concerns with suffering; Hopeful   Intervention Active listening;Discussed belief system/Uatsdin practices/raisa;Discussed illness injury and its impact; Discussed relationship with God;Empowerment;Life review/Legacy;Prayer (assurance of)/[place];Sustaining Presence/Ministry of presence   Outcome Engaged in conversation;Encouraged;Expressed Gratitude; Optimistic   Plan and Referrals   Plan/Referrals Continue Support (comment)  (As needed)

## 2023-02-02 NOTE — PROGRESS NOTES
Outpatient Pharmacy Progress Note for Meds-to-Beds    Total number of Prescriptions Filled: 1  The following medications were dispensed to the patient during the discharge process:  Pantoprazole   Amiodarone     Additional Documentation:  Patient picked-up the prescriptions in the OP Pharmacy upon discharge       Thank you for letting us serve your patients.   [address] [place]    [address], [address] [facility]    Phone: [phone]    Fax: [phone]

## 2023-02-03 ENCOUNTER — TELEPHONE (OUTPATIENT)
Dept: CARDIOLOGY CLINIC | Age: 58
End: 2023-02-03

## 2023-02-03 NOTE — TELEPHONE ENCOUNTER
Faxed and completed FMLA forms for patients spouse. Confirmation received when faxed to employer.  Forms are at  ready for

## 2023-02-06 RX ORDER — POTASSIUM CHLORIDE 750 MG/1
10 TABLET, EXTENDED RELEASE ORAL 2 TIMES DAILY
Qty: 180 TABLET | Refills: 3 | Status: SHIPPED | OUTPATIENT
Start: 2023-02-06

## 2023-02-08 ENCOUNTER — OFFICE VISIT MH/BH (OUTPATIENT)
Dept: BARIATRICS/WEIGHT MGMT | Age: 58
End: 2023-02-08
Payer: COMMERCIAL

## 2023-02-08 VITALS
HEART RATE: 86 BPM | WEIGHT: 293 LBS | OXYGEN SATURATION: 98 % | SYSTOLIC BLOOD PRESSURE: 144 MMHG | DIASTOLIC BLOOD PRESSURE: 84 MMHG | HEIGHT: 64 IN | BODY MASS INDEX: 50.02 KG/M2

## 2023-02-08 DIAGNOSIS — E66.01 MORBID OBESITY WITH BMI OF 50.0-59.9, ADULT (HCC): ICD-10-CM

## 2023-02-08 DIAGNOSIS — Z79.899 MEDICATION MANAGEMENT: Primary | ICD-10-CM

## 2023-02-08 PROCEDURE — 99204 OFFICE O/P NEW MOD 45 MIN: CPT | Performed by: NURSE PRACTITIONER

## 2023-02-08 PROCEDURE — 3077F SYST BP >= 140 MM HG: CPT | Performed by: NURSE PRACTITIONER

## 2023-02-08 PROCEDURE — 3079F DIAST BP 80-89 MM HG: CPT | Performed by: NURSE PRACTITIONER

## 2023-02-08 RX ORDER — CARBOXYMETHYLCELLULOSE/CITRIC 0.75 G
3 CAPSULE ORAL
Qty: 180 CAPSULE | Refills: 0 | Status: SHIPPED | OUTPATIENT
Start: 2023-02-08

## 2023-02-08 ASSESSMENT — PATIENT HEALTH QUESTIONNAIRE - PHQ9
SUM OF ALL RESPONSES TO PHQ QUESTIONS 1-9: 0
2. FEELING DOWN, DEPRESSED OR HOPELESS: 0
SUM OF ALL RESPONSES TO PHQ9 QUESTIONS 1 & 2: 0
SUM OF ALL RESPONSES TO PHQ QUESTIONS 1-9: 0
1. LITTLE INTEREST OR PLEASURE IN DOING THINGS: 0

## 2023-02-08 NOTE — PROGRESS NOTES
Chief Complaint   Patient presents with    Weight Management     NP- 1st medication wm visit. No bar cov. SUBJECTIVE:    HPI: Patient is here with complaints of: Monthly Plentity visit. Obesity with a BMI of Body mass index is 51.18 kg/m². .    Current weight: 293 pounds    Plentity should be avoided with any of the following conditions: allergy to cellulose or gelatin; esophageal anatomic anomalies; strictures, Crohn's disease; gastroparesis; excessive acid reflux; ulcers; and any surgical procedure that has affected gastrointestinal transit or motility  DENIES    Pt complains of the following side effects:  excessive diarrhea, distended abdomen, or  limited bowel movements DENIES    Current dietary measures: not tracking calories    Current exercise measures: not currently    I have reviewed the patient's(pertinent information to this visit) medical history, family history(scanned in  the [address] [place] [address] under \"patient questioner\"), social history and review of systems with the patient today in the office. Past Surgical History:   Procedure Laterality Date    14 [facility]    times 2    COLONOSCOPY  2016    COLONOSCOPY N/A 11/15/2018    COLONOSCOPY POLYPECTOMY ABLATION/SNARE/HEMOCLIPS X4 TO PROXIMAL ASCENDING POLYPECTOMY SITE performed by Eri Calvo MD at [address] [place] [address], [address] [place] [address], [address] [place] [address]    EYE SURGERY  2016    gavino cataract ext    ROTATOR CUFF REPAIR Right 10/2012       Past Medical History:   Diagnosis Date    Atrial fibrillation with RVR ([address] 07/01/2017    Bell palsy     right side of face affected    H/O 24 hour EKG monitoring 03/30/2010    holter: other then an episode of sinus tach at 150 bpm no other clinically significant arrhythmia seen. Symptoms reported in pts diary do not correlate with this episode    H/O cardiovascular stress test 01/14/2010    Joe protocol: normal stress study.  gated study shows uniform wall motion with calculated LV EF of over 67 %    H/O echocardiogram 2010    ECHO:  There is no comparison study available. There is mild concentric LV hypertrophy. LVSF is normal, EF = > 55 %    H/O echocardiogram 2014    EF55%, borderline left atrial enlargement, normal LV systolic function, mild mitral regurg, no pericardial effusion. H/O echocardiogram 2017    EF55%  AV mild  sclerotic    H/O echocardiogram 2018    EF55-60% sclerotic non stenosed AV    H/O transesophageal echocardiography (ANA) for monitoring 2022    Calcification noted on the non-coronary cusp of the aortic valve. MILD MR. Heart palpitations     History of cardiac monitoring 2014    Event - no clinically significant arrythmias    History of cardiac monitoring 08/10/2017    No clinically signifacant arrythmias    History of complete ECG 2010    HX OTHER MEDICAL     \"not sure if have sleep apnea- have cpap but the last sleep study not conclusive so having another sleep study done at [facility] a cpap but never offically dx with sleep apnea\"    Hyperlipidemia 2010    Hypertension     follows with Dr Peggy Summers    Obesity     S/P ablation of atrial fibrillation 2023    S/P atrial fibrillation ablation performed by Dr. Dede Rose.        Family History   Problem Relation Age of Onset    High Blood Pressure Mother     Cancer Mother         lung ca    Heart Disease Father     Heart Surgery Father         cabg x 2    Other Father         AAA    Diabetes Father     Heart Attack Brother     Breast Cancer Neg Hx        Social History     Socioeconomic History    Marital status:      Spouse name: Not on file    Number of children: 2    Years of education: Not on file    Highest education level: Not on file   Occupational History    Occupation: full time   Tobacco Use    Smoking status: Former     Packs/day: 0.50     Years: 30.00     Pack years: 15.00     Types: Cigarettes     Quit date: 2002     Years since quittin.6    Smokeless tobacco: Never   Vaping Use    Vaping Use: Never used   Substance and Sexual Activity    Alcohol use: No     Comment: NO CAFFEINE    Drug use: No    Sexual activity: Yes     Partners: Male   Other Topics Concern    Not on file   Social History Narrative    Not on file     Social Determinants of Health     Financial Resource Strain: Not on file   Food Insecurity: Not on file   Transportation Needs: Not on file   Physical Activity: Not on file   Stress: Not on file   Social Connections: Not on file   Intimate Partner Violence: Not on file   Housing Stability: Not on file       Current Outpatient Medications   Medication Sig Dispense Refill    Carboxymeth-Cellulose-CitricAc (PLENITY) CAPS Take 3 capsules by mouth 2 times daily (before meals) Take 3 capsules 30 minutes before lunch and dinner with 16 oz water each 180 capsule 0    potassium chloride (KLOR-CON M) 10 MEQ extended release tablet Take 1 tablet by mouth 2 times daily 180 tablet 3    metoprolol tartrate (LOPRESSOR) 25 MG tablet Take 1 tablet by mouth 2 times daily 60 tablet 1    pantoprazole (PROTONIX) 40 MG tablet Take 1 tablet by mouth daily 30 tablet 0    amiodarone (CORDARONE) 200 MG tablet Take 1 tablet by mouth 2 times daily for 28 doses 28 tablet 0    [START ON 2/17/2023] amiodarone (CORDARONE) 200 MG tablet Take 1 tablet by mouth daily 90 tablet 1    furosemide (LASIX) 40 MG tablet Take 1 tablet by mouth 2 times daily 60 tablet 3    rivaroxaban (XARELTO) 20 MG TABS tablet Take 1 tablet by mouth daily (with breakfast) 90 tablet 1    fenofibrate (TRICOR) 145 MG tablet Take 1 tablet by mouth daily 90 tablet 3    Compression Stockings MISC by Does not apply route Moderate Compression level (Patient taking differently: by Does not apply route Moderate Compression level- wears occasionally) 2 each 0    FISH OIL by Does not apply route daily        No current facility-administered medications for this visit.         Allergies   Allergen Reactions    Cozaar [Losartan Potassium] Other (See Comments)     Cough    Lisinopril Other (See Comments)     Cough    Pcn [Penicillins] Other (See Comments)     Unknown as a child think a rash       Review of Systems:         Review of Systems   Constitutional:  Positive for fatigue. Cardiovascular:  Positive for leg swelling. Irreg heart rate   All other systems reviewed and are negative. OBJECTIVE:  Physical Exam:    BP (!) 144/84 (Site: Left Upper Arm, Position: Sitting, Cuff Size: Large Adult)   Pulse 86   Ht 5' 3.5\" (1.613 m)   Wt 293 lb 8 oz (133.1 kg)   LMP 09/19/2018   SpO2 98%   BMI 51.18 kg/m²      Physical Exam  Constitutional:       Appearance: Normal appearance. She is obese. HENT:      Head: Normocephalic. Nose: Nose normal.      Mouth/Throat:      Pharynx: Oropharynx is clear. Eyes:      Conjunctiva/sclera: Conjunctivae normal.      Pupils: Pupils are equal, round, and reactive to light. Cardiovascular:      Rate and Rhythm: Normal rate. Pulses: Normal pulses. Pulmonary:      Effort: Pulmonary effort is normal.   Abdominal:      Palpations: Abdomen is soft. Musculoskeletal:         General: Normal range of motion. Cervical back: Normal range of motion. Skin:     General: Skin is warm and dry. Capillary Refill: Capillary refill takes less than 2 seconds. Neurological:      General: No focal deficit present. Mental Status: She is alert and oriented to person, place, and time. Psychiatric:         Mood and Affect: Mood normal.         Behavior: Behavior normal.         ASSESSMENT:  1. Morbid obesity with BMI of 50.0-59.9, adult (HCC)  - Continue tracking calories; about  1581-7681 daily  - 64 oz water daily  - Increase activity as able  - See RD    2.  Medication management  - Recent CBC, CMP, and EKG  reviewed  - Limited on medications due to insurance coverage and health history  - Would benefit from Plentity   - RX sent to pharmacy  - Call for any concerns  - RTC one month    - Amb Referral to Nutrition Services    PLAN:    -BMI is 25-40+  kg with weight-related risk factors.     -Pt is aware that in order to be successful, must combine Plentity with appropriate diet and exercise plan. - Take 3 capsules (1 pod) with 16 ounces of water 20-30 minutes before lunch and dinner, 7 days a week    -Pt aware must continue to meet monthly in person while on this medication. F/u in one month. -Check [facility] Reporting [facility]. Any concerns: NONE Reported       Orders Placed This Encounter   Procedures    Amb Referral to Nutrition Services          Orders Placed This Encounter   Medications    Carboxymeth-Cellulose-CitricAc (PLENITY) CAPS     Sig: Take 3 capsules by mouth 2 times daily (before meals) Take 3 capsules 30 minutes before lunch and dinner with 16 oz water each     Dispense:  180 capsule     Refill:  0          Follow Up:  Return in about 1 month (around 3/8/2023).       EVE Sanderson - CNP

## 2023-02-10 ENCOUNTER — OFFICE VISIT (OUTPATIENT)
Dept: CARDIOLOGY CLINIC | Age: 58
End: 2023-02-10
Payer: COMMERCIAL

## 2023-02-10 VITALS
WEIGHT: 293 LBS | BODY MASS INDEX: 50.02 KG/M2 | HEART RATE: 95 BPM | DIASTOLIC BLOOD PRESSURE: 84 MMHG | SYSTOLIC BLOOD PRESSURE: 138 MMHG | HEIGHT: 64 IN | OXYGEN SATURATION: 99 %

## 2023-02-10 DIAGNOSIS — I48.0 PAF (PAROXYSMAL ATRIAL FIBRILLATION) (HCC): Primary | ICD-10-CM

## 2023-02-10 DIAGNOSIS — Z86.79 S/P ABLATION OF ATRIAL FIBRILLATION: ICD-10-CM

## 2023-02-10 DIAGNOSIS — D68.69 HYPERCOAGULABLE STATE DUE TO PAROXYSMAL ATRIAL FIBRILLATION (HCC): ICD-10-CM

## 2023-02-10 DIAGNOSIS — I48.0 HYPERCOAGULABLE STATE DUE TO PAROXYSMAL ATRIAL FIBRILLATION (HCC): ICD-10-CM

## 2023-02-10 DIAGNOSIS — I10 ESSENTIAL HYPERTENSION: ICD-10-CM

## 2023-02-10 DIAGNOSIS — Z98.890 S/P ABLATION OF ATRIAL FIBRILLATION: ICD-10-CM

## 2023-02-10 PROCEDURE — 3079F DIAST BP 80-89 MM HG: CPT | Performed by: NURSE PRACTITIONER

## 2023-02-10 PROCEDURE — 93000 ELECTROCARDIOGRAM COMPLETE: CPT | Performed by: NURSE PRACTITIONER

## 2023-02-10 PROCEDURE — 3075F SYST BP GE 130 - 139MM HG: CPT | Performed by: NURSE PRACTITIONER

## 2023-02-10 PROCEDURE — 99214 OFFICE O/P EST MOD 30 MIN: CPT | Performed by: NURSE PRACTITIONER

## 2023-02-10 NOTE — PROGRESS NOTES
Electrophysiology Follow up  Note      Reason for consultation: Atrial fibrillation    Chief complaint:  1 week follow up s/p ablation of atrial fibrillation    Referring physician:        Primary care physician: No primary care provider on file. History of Present Illness: This visit 2/10/2023  Patient here today for follow-up on ablation of atrial fibrillation. Patient reports that she has had 2 episodes of dizziness. She states that it came on suddenly after taking her morning medications. She states both episodes lasted about 3 hours and then resolved on their own. Patient recently had ablation for atrial fibrillation. Patient unfortunately went back into atrial fibrillation post ablation. Patient was started on amiodarone. Patient denies chest pain, shortness of breath, edema or syncope      Previous visit (12/29/2022)      Chief Complaint   Patient presents with    Consultation     Cecille Simon- thinks its due to being overweight  Palpitations- Pt is having palpitations, they are constant but not painful like they were last time she had them  No nicotine, no alcohol  Caffeine- rarely- hardly ever  Exercise- no    Shortness of Breath    Palpitations           Previous visit: (10/27/2017      Chief Complaint   Patient presents with    Atrial Fibrillation     Pt states palpitations happen rarely. Pt denies chest pain, shortness of breath, dizziness, and edema. Previous visit:    Chief Complaint   Patient presents with    Atrial Fibrillation     MO-discuss afib ablation. Attended class on 7/14. Patient denies CP, dizziness, edema. She does report palpitations constantly as well as SOB at times due to the palpitations. Patient is still wearing event monitor, it is due to come off tomorrow. Patient was in the hospital in Atrial fibrillation in July. Denies syncope  On medications last few days she has been much better.         Patient has symptoms of sleep apnea - snoring - tired when awake and sleeps in the day. Past medical history:   Past Medical History:   Diagnosis Date    Atrial fibrillation with RVR ([address] 07/01/2017    Bell palsy     right side of face affected    H/O 24 hour EKG monitoring 03/30/2010    holter: other then an episode of sinus tach at 150 bpm no other clinically significant arrhythmia seen. Symptoms reported in pts diary do not correlate with this episode    H/O cardiovascular stress test 01/14/2010    Joe protocol: normal stress study. gated study shows uniform wall motion with calculated LV EF of over 67 %    H/O echocardiogram 05/20/2010    ECHO:  There is no comparison study available. There is mild concentric LV hypertrophy. LVSF is normal, EF = > 55 %    H/O echocardiogram 07/18/2014    EF55%, borderline left atrial enlargement, normal LV systolic function, mild mitral regurg, no pericardial effusion. H/O echocardiogram 07/03/2017    EF55%  AV mild  sclerotic    H/O echocardiogram 11/07/2018    EF55-60% sclerotic non stenosed AV    H/O transesophageal echocardiography (ANA) for monitoring 12/14/2022    Calcification noted on the non-coronary cusp of the aortic valve. MILD MR. Heart palpitations     History of cardiac monitoring 07/21/2014    Event - no clinically significant arrythmias    History of cardiac monitoring 08/10/2017    No clinically signifacant arrythmias    History of complete ECG 05/11/2010    HX OTHER MEDICAL     \"not sure if have sleep apnea- have cpap but the last sleep study not conclusive so having another sleep study done at [facility] a cpap but never offically dx with sleep apnea\"    Hyperlipidemia 04/2010    Hypertension     follows with Dr Bates Amend    Obesity     S/P ablation of atrial fibrillation 02/01/2023    S/P atrial fibrillation ablation performed by Dr. Samy Stuart.        Surgical history :   Past Surgical History:   Procedure Laterality Date    14 [facility] times 2    COLONOSCOPY  2016    COLONOSCOPY N/A 11/15/2018    COLONOSCOPY POLYPECTOMY ABLATION/SNARE/HEMOCLIPS X4 TO PROXIMAL ASCENDING POLYPECTOMY SITE performed by Den Wolff MD at [address] [place] [address], [place], [address] [place] [address]    EYE SURGERY  2016    gavino cataract ext    ROTATOR CUFF REPAIR Right 10/2012       Family history:   Family History   Problem Relation Age of Onset    High Blood Pressure Mother     Cancer Mother         lung ca    Heart Disease Father     Heart Surgery Father         cabg x 2    Other Father         AAA    Diabetes Father     Heart Attack Brother     Breast Cancer Neg Hx        Social history :  reports that she quit smoking about 20 years ago. Her smoking use included cigarettes. She has a 15.00 pack-year smoking history. She has never used smokeless tobacco. She reports that she does not drink alcohol and does not use drugs.     Allergies   Allergen Reactions    Cozaar [Losartan Potassium] Other (See Comments)     Cough    Lisinopril Other (See Comments)     Cough    Pcn [Penicillins] Other (See Comments)     Unknown as a child think a rash       Current Outpatient Medications on File Prior to Visit   Medication Sig Dispense Refill    Carboxymeth-Cellulose-CitricAc (PLENITY) CAPS Take 3 capsules by mouth 2 times daily (before meals) Take 3 capsules 30 minutes before lunch and dinner with 16 oz water each (Patient taking differently: Take 3 capsules by mouth 2 times daily (before meals) Take 3 capsules 30 minutes before lunch and dinner with 16 oz water each    Havent started yet.) 180 capsule 0    potassium chloride (KLOR-CON M) 10 MEQ extended release tablet Take 1 tablet by mouth 2 times daily 180 tablet 3    metoprolol tartrate (LOPRESSOR) 25 MG tablet Take 1 tablet by mouth 2 times daily 60 tablet 1    pantoprazole (PROTONIX) 40 MG tablet Take 1 tablet by mouth daily 30 tablet 0    amiodarone (CORDARONE) 200 MG tablet Take 1 tablet by mouth 2 times daily for 28 doses 28 tablet 0    [START ON 2/17/2023] amiodarone (CORDARONE) 200 MG tablet Take 1 tablet by mouth daily (Patient taking differently: Take 200 mg by mouth daily Will start 2/16/2023) 90 tablet 1    furosemide (LASIX) 40 MG tablet Take 1 tablet by mouth 2 times daily 60 tablet 3    rivaroxaban (XARELTO) 20 MG TABS tablet Take 1 tablet by mouth daily (with breakfast) 90 tablet 1    fenofibrate (TRICOR) 145 MG tablet Take 1 tablet by mouth daily 90 tablet 3    FISH OIL by Does not apply route daily       Compression Stockings MISC by Does not apply route Moderate Compression level (Patient taking differently: by Does not apply route Moderate Compression level- wears occasionally) 2 each 0     No current facility-administered medications on file prior to visit. Review of Systems:   Review of Systems   Constitutional: Positive for fatigue. Negative for activity change, chills and fever. HENT: Negative for congestion, ear pain and tinnitus. Eyes: Negative for photophobia, pain and visual disturbance. Respiratory:  Negative for cough, chest tightness and wheezing. Cardiovascular: Positive for palpitations. Negative for chest pain and leg swelling. Shortness of breath with exertion  Gastrointestinal: Negative for abdominal pain, blood in stool, constipation, diarrhea, nausea and vomiting. Endocrine: Negative for cold intolerance and heat intolerance. Genitourinary: Negative for dysuria, flank pain and hematuria. Musculoskeletal: Negative for arthralgias, back pain, myalgias and neck stiffness. Skin: Negative for color change and rash. Allergic/Immunologic: Negative for food allergies. Neurological:postive for dizziness, negative light-headedness, numbness and headaches. Hematological: Does not bruise/bleed easily. Psychiatric/Behavioral: Negative for agitation, behavioral problems and confusion.            Physical Examination:    /84 (Site: Right Upper Arm, Position: Sitting, Cuff Size: Large Adult)   Pulse 95   Ht 5' 3.5\" (1.613 m)   Wt 293 lb (132.9 kg)   LMP 09/19/2018   SpO2 99%   BMI 51.09 kg/m²    Wt Readings from Last 3 Encounters:   02/10/23 293 lb (132.9 kg)   02/08/23 293 lb 8 oz (133.1 kg)   02/01/23 290 lb (131.5 kg)     Body mass index is 51.09 kg/m². Physical Exam   Constitutional: She is oriented to person, place, and time and well-developed, well-nourished, and in no distress. HENT:   Head: Normocephalic and atraumatic. Eyes: Conjunctivae are normal. Pupils are equal, round, and reactive to light. Right and left eye exhibits no discharge. Neck: Normal range of motion. No JVD present. No thyromegaly present. Cardiovascular: Normal rate, regular rhythm and normal heart sounds. Exam reveals no friction rub. No murmur heard. Pulmonary/Chest: Effort normal and breath sounds normal. No stridor. No respiratory distress. She has no wheezes. Abdominal: Soft. Bowel sounds are normal. She exhibits no distension. There is no tenderness. Musculoskeletal: Normal range of motion. She exhibits no edema or tenderness. Neurological: She is alert and oriented to person, place, and time. She displays normal reflexes. No cranial nerve deficit. Coordination normal.   Skin: Skin is warm and dry. No rash noted. No erythema. Bilateral femoral groin sites soft, nontender, no hematoma.    Psychiatric: Mood and affect normal.             CBC:   Lab Results   Component Value Date/Time    WBC 6.3 01/27/2023 11:36 AM    HGB 17.0 02/01/2023 09:08 AM    HCT 50.0 02/01/2023 09:08 AM     01/27/2023 11:36 AM     Lipids:   Lab Results   Component Value Date    CHOL 155 03/29/2021    TRIG 102 03/29/2021    HDL 39 (L) 03/29/2021    LDLCALC 96 03/29/2021    LDLDIRECT 88 01/20/2020     PT/INR:   Lab Results   Component Value Date/Time    INR 1.96 01/27/2023 11:36 AM        BMP:    Lab Results   Component Value Date     02/01/2023    K 4.0 02/01/2023     01/27/2023    CO2 30 02/01/2023    BUN 25 (H) 01/27/2023     CMP:   Lab Results   Component Value Date    AST 19 07/01/2017    PROT 7.0 07/01/2017    BILITOT 0.4 07/01/2017    ALKPHOS 61 07/01/2017     TSH:    Lab Results   Component Value Date/Time    TSH 3.54 10/26/2016 12:07 PM       EKGINTERPRETATION - EKG Interpretation:  atrial fibrillation    Vitals:    02/10/23 1123   BP: 138/84   Site: Right Upper Arm   Position: Sitting   Cuff Size: Large Adult   Pulse: 95   SpO2: 99%   Weight: 293 lb (132.9 kg)   Height: 5' 3.5\" (1.613 m)        IMPRESSION / RECOMMENDATIONS:     1. PAF- s/p ablation of paroxysmal atrial fibrillation  2. Morbid obesity BMI 50  3. Sleep apnea  4. HTN  5. hld  6. Metabolic syndrome  7. Hypercoagulable due to PAF    EKG obtained and reviewed patient noted to be in atrial fibrillation  Patient to complete amiodarone 200 mg twice daily for 14 days then amiodarone 200 mg daily  Patient remains in atrial fibrillation at this time. We will schedule patient for cardioversion at earliest convenience  Patient does have underlying bradycardia when in sinus rhythm. Discussed with patient about possibility of needing pacemaker. Patient voiced understanding    Patient denies issues with bleeding.   We will continue Xarelto 20 mg daily    Vitals:    02/10/23 1123   BP: 138/84   Pulse: 95   SpO2: 99%     Pressure is stable patient to continue Lopressor 25 mg twice daily and Lasix 40 mg daily        VEE Steve - CNP

## 2023-02-16 ENCOUNTER — NURSE ONLY (OUTPATIENT)
Dept: CARDIOLOGY CLINIC | Age: 58
End: 2023-02-16

## 2023-02-16 ENCOUNTER — HOSPITAL ENCOUNTER (OUTPATIENT)
Age: 58
Setting detail: SPECIMEN
Discharge: HOME | End: 2023-02-16
Payer: COMMERCIAL

## 2023-02-16 DIAGNOSIS — I48.0 PAROXYSMAL ATRIAL FIBRILLATION (HCC): Primary | ICD-10-CM

## 2023-02-16 LAB
SARS-COV-2 RNA RESP QL NAA+PROBE: NOT DETECTED
SOURCE: NORMAL

## 2023-02-16 PROCEDURE — U0003 INFECTIOUS AGENT DETECTION BY NUCLEIC ACID (DNA OR RNA); SEVERE ACUTE RESPIRATORY SYNDROME CORONAVIRUS 2 (SARS-COV-2) (CORONAVIRUS DISEASE [COVID-19]), AMPLIFIED PROBE TECHNIQUE, MAKING USE OF HIGH THROUGHPUT TECHNOLOGIES AS DESCRIBED BY CMS-2020-01-R: HCPCS

## 2023-02-16 PROCEDURE — U0005 INFEC AGEN DETEC AMPLI PROBE: HCPCS

## 2023-02-17 ENCOUNTER — TELEPHONE (OUTPATIENT)
Dept: CARDIOLOGY CLINIC | Age: 58
End: 2023-02-17

## 2023-02-17 DIAGNOSIS — E78.5 HYPERLIPIDEMIA, UNSPECIFIED HYPERLIPIDEMIA TYPE: ICD-10-CM

## 2023-02-17 DIAGNOSIS — I48.91 ATRIAL FIBRILLATION, UNSPECIFIED TYPE (HCC): ICD-10-CM

## 2023-02-17 NOTE — TELEPHONE ENCOUNTER
Patient called her  dropped off [facility] paper work for his employer to be off with her at her procedure ,his employer has not received as off today

## 2023-02-19 ENCOUNTER — ANESTHESIA EVENT (OUTPATIENT)
Dept: CARDIAC CATH/INVASIVE PROCEDURES | Age: 58
End: 2023-02-19
Payer: COMMERCIAL

## 2023-02-19 NOTE — ANESTHESIA PRE PROCEDURE
Department of Anesthesiology  Preprocedure Note       Name:  Harper Finley   Age:  59 y.o.  :  1965                                          MRN:  [id number]         Date:  2023      Surgeon: * No surgeons listed *    Procedure: * No procedures listed *    Medications prior to admission:   Prior to Admission medications    Medication Sig Start Date End Date Taking? Authorizing Provider   rivaroxaban (XARELTO) 20 MG TABS tablet Take 1 tablet by mouth daily (with breakfast) 23   Dom Rodrigues MD   Carboxymeth-Cellulose-CitricAc (PLENITY) CAPS Take 3 capsules by mouth 2 times daily (before meals) Take 3 capsules 30 minutes before lunch and dinner with 16 oz water each  Patient taking differently: Take 3 capsules by mouth 2 times daily (before meals) Take 3 capsules 30 minutes before lunch and dinner with 16 oz water each    Havent started yet.  23   EVE Lindo CNP   potassium chloride (KLOR-CON M) 10 MEQ extended release tablet Take 1 tablet by mouth 2 times daily 23   Dom Rodrigues MD   metoprolol tartrate (LOPRESSOR) 25 MG tablet Take 1 tablet by mouth 2 times daily 23   EVE Wheat CNP   pantoprazole (PROTONIX) 40 MG tablet Take 1 tablet by mouth daily 23   EVE Wheat CNP   amiodarone (CORDARONE) 200 MG tablet Take 1 tablet by mouth 2 times daily for 28 doses 23  EVE Wheat CNP   amiodarone (CORDARONE) 200 MG tablet Take 1 tablet by mouth daily  Patient taking differently: Take 200 mg by mouth daily Will start 2023   EVE Wheat CNP   furosemide (LASIX) 40 MG tablet Take 1 tablet by mouth 2 times daily 1/10/23   EVE Newman CNP   fenofibrate (TRICOR) 145 MG tablet Take 1 tablet by mouth daily 10/10/22   Dom Rodrigues MD   Compression Stockings MISC by Does not apply route Moderate Compression level  Patient taking differently: by Does not apply route Moderate Compression level- wears occasionally 4/25/16   Rachna Geronimo MD   FISH OIL by Does not apply route daily     Historical Provider, MD       Current medications:    Current Outpatient Medications   Medication Sig Dispense Refill    rivaroxaban (XARELTO) 20 MG TABS tablet Take 1 tablet by mouth daily (with breakfast) 90 tablet 1    Carboxymeth-Cellulose-CitricAc (PLENITY) CAPS Take 3 capsules by mouth 2 times daily (before meals) Take 3 capsules 30 minutes before lunch and dinner with 16 oz water each (Patient taking differently: Take 3 capsules by mouth 2 times daily (before meals) Take 3 capsules 30 minutes before lunch and dinner with 16 oz water each    Havent started yet.) 180 capsule 0    potassium chloride (KLOR-CON M) 10 MEQ extended release tablet Take 1 tablet by mouth 2 times daily 180 tablet 3    metoprolol tartrate (LOPRESSOR) 25 MG tablet Take 1 tablet by mouth 2 times daily 60 tablet 1    pantoprazole (PROTONIX) 40 MG tablet Take 1 tablet by mouth daily 30 tablet 0    amiodarone (CORDARONE) 200 MG tablet Take 1 tablet by mouth 2 times daily for 28 doses 28 tablet 0    amiodarone (CORDARONE) 200 MG tablet Take 1 tablet by mouth daily (Patient taking differently: Take 200 mg by mouth daily Will start 2/16/2023) 90 tablet 1    furosemide (LASIX) 40 MG tablet Take 1 tablet by mouth 2 times daily 60 tablet 3    fenofibrate (TRICOR) 145 MG tablet Take 1 tablet by mouth daily 90 tablet 3    Compression Stockings MISC by Does not apply route Moderate Compression level (Patient taking differently: by Does not apply route Moderate Compression level- wears occasionally) 2 each 0    FISH OIL by Does not apply route daily        No current facility-administered medications for this encounter. Allergies:     Allergies   Allergen Reactions    Cozaar [Losartan Potassium] Other (See Comments)     Cough    Lisinopril Other (See Comments)     Cough    Pcn [Penicillins] Other (See Comments)     Unknown as a child think a rash       Problem List:    Patient Active Problem List   Diagnosis Code    Hyperlipidemia E78.5    Obesity, morbid, BMI 40.0-49.9 ([facility] E66.01    H/O echocardiogram Q94.28    Metabolic syndrome N47.10    Venous stasis dermatitis of both lower extremities I87.2    Dependent edema R60.9    Atrial fibrillation with RVR ([facility]) I48.91    Essential hypertension I10    PAF (paroxysmal atrial fibrillation) ([facility]) I48.0    MARLINE treated with BiPAP G47.33    Neck pain on left side M54.2    Cervical radiculopathy M54.12    Left lateral epicondylitis M77.12    Anticoagulated Z79.01    Colon polyps K63.5    VHD (valvular heart disease) I38    S/P ablation of atrial fibrillation Z98.890, Z86.79       Past Medical History:        Diagnosis Date    Atrial fibrillation with RVR ([facility] 07/01/2017    Bell palsy     right side of face affected    H/O 24 hour EKG monitoring 03/30/2010    holter: other then an episode of sinus tach at 150 bpm no other clinically significant arrhythmia seen. Symptoms reported in pts diary do not correlate with this episode    H/O cardiovascular stress test 01/14/2010    Joe protocol: normal stress study. gated study shows uniform wall motion with calculated LV EF of over 67 %    H/O echocardiogram 05/20/2010    ECHO:  There is no comparison study available. There is mild concentric LV hypertrophy. LVSF is normal, EF = > 55 %    H/O echocardiogram 07/18/2014    EF55%, borderline left atrial enlargement, normal LV systolic function, mild mitral regurg, no pericardial effusion.  H/O echocardiogram 07/03/2017    EF55%  AV mild  sclerotic    H/O echocardiogram 11/07/2018    EF55-60% sclerotic non stenosed AV    H/O transesophageal echocardiography (ANA) for monitoring 12/14/2022    Calcification noted on the non-coronary cusp of the aortic valve.  MILD MR.    Heart palpitations     History of cardiac monitoring 07/21/2014    Event - no clinically significant arrythmias    History of cardiac monitoring 08/10/2017    No clinically signifacant arrythmias    History of complete ECG 2010    HX OTHER MEDICAL     \"not sure if have sleep apnea- have cpap but the last sleep study not conclusive so having another sleep study done at [facility] a cpap but never offically dx with sleep apnea\"    Hyperlipidemia 2010    Hypertension     follows with Dr Malcolm Fuentes S/P ablation of atrial fibrillation 2023    S/P atrial fibrillation ablation performed by Dr. Betzaida Henderson. Past Surgical History:        Procedure Laterality Date     SECTION  ,     times 2    COLONOSCOPY  2016    COLONOSCOPY N/A 11/15/2018    COLONOSCOPY POLYPECTOMY ABLATION/SNARE/HEMOCLIPS X4 TO PROXIMAL ASCENDING POLYPECTOMY SITE performed by Misha Montes MD at [address] [place] [address], [place], DIAGNOSTIC  2000    EYE SURGERY  2016    gavino cataract ext    ROTATOR CUFF REPAIR Right 10/2012       Social History:    Social History     Tobacco Use    Smoking status: Former     Packs/day: 0.50     Years: 30.00     Pack years: 15.00     Types: Cigarettes     Quit date: 2002     Years since quittin.6    Smokeless tobacco: Never   Substance Use Topics    Alcohol use: No     Comment: NO CAFFEINE                                Counseling given: Not Answered      Vital Signs (Current): There were no vitals filed for this visit.                                            BP Readings from Last 3 Encounters:   02/10/23 138/84   23 (!) 144/84   23 115/76       NPO Status:                                                                                 BMI:   Wt Readings from Last 3 Encounters:   02/10/23 293 lb (132.9 kg)   23 293 lb 8 oz (133.1 kg)   23 290 lb (131.5 kg)     There is no height or weight on file to calculate BMI.    CBC:   Lab Results   Component Value Date/Time    WBC 6.3 2023 11:36 AM    RBC 5.83 2023 11:36 AM    HGB 17.0 02/01/2023 09:08 AM    HCT 50.0 02/01/2023 09:08 AM    MCV 91.4 01/27/2023 11:36 AM    RDW 13.1 01/27/2023 11:36 AM     01/27/2023 11:36 AM       CMP:   Lab Results   Component Value Date/Time     02/01/2023 09:08 AM    K 4.0 02/01/2023 09:08 AM     01/27/2023 11:36 AM    CO2 30 02/01/2023 09:08 AM    BUN 25 01/27/2023 11:36 AM    CREATININE 1.1 02/01/2023 09:08 AM    CREATININE 1.2 01/27/2023 11:36 AM    GFRAA >60 03/09/2022 11:03 AM    AGRATIO 2.3 04/10/2018 11:40 AM    LABGLOM 52 01/27/2023 11:36 AM    GLUCOSE 89 01/27/2023 11:36 AM    PROT 6.8 01/20/2020 09:23 AM    CALCIUM 10.0 01/27/2023 11:36 AM    BILITOT 0.4 01/20/2020 09:23 AM    ALKPHOS 33 01/20/2020 09:23 AM    AST 19 01/20/2020 09:23 AM    ALT 15 01/20/2020 09:23 AM       POC Tests: No results for input(s): POCGLU, POCNA, POCK, POCCL, POCBUN, POCHEMO, POCHCT in the last 72 hours.     Coags:   Lab Results   Component Value Date/Time    PROTIME 25.4 01/27/2023 11:36 AM    INR 1.96 01/27/2023 11:36 AM    APTT 77.4 01/27/2023 11:36 AM       HCG (If Applicable):   Lab Results   Component Value Date    PREGTESTUR NEGATIVE 11/15/2018        ABGs:   Lab Results   Component Value Date/Time    PO2ART 442.3 02/01/2023 09:08 AM    YMU1BJA 50.9 02/01/2023 09:08 AM    PVP2WWF 28.3 02/01/2023 09:08 AM        Type & Screen (If Applicable):  No results found for: LABABO, LABRH    Drug/Infectious Status (If Applicable):  No results found for: HIV, HEPCAB    COVID-19 Screening (If Applicable):   Lab Results   Component Value Date/Time    COVID19 NOT DETECTED 02/16/2023 12:00 PM           Anesthesia Evaluation  Patient summary reviewed  Airway:           Dental:          Pulmonary:   (+) sleep apnea: on CPAP,                            ROS comment: Former smoker   Cardiovascular:    (+) hypertension:, dysrhythmias (s/p ablation): atrial fibrillation, hyperlipidemia                  Neuro/Psych:   (+) neuromuscular disease:,             GI/Hepatic/Renal: (+) morbid obesity (super morbid BMI 51)          Endo/Other: Negative Endo/Other ROS                    Abdominal:             Vascular: negative vascular ROS. Other Findings:           Anesthesia Plan      MAC     ASA 4       Induction: intravenous. Pre Anesthesia Assessment complete. Chart reviewed on 2/19/2023. This is a chart review only.     Matti Hale,    2/19/2023

## 2023-02-20 NOTE — TELEPHONE ENCOUNTER
Patient called she had not heard back about FMLA paper work that her  needs filled out for her up coming procedure

## 2023-02-21 ENCOUNTER — ANESTHESIA (OUTPATIENT)
Dept: CARDIAC CATH/INVASIVE PROCEDURES | Age: 58
End: 2023-02-21
Payer: COMMERCIAL

## 2023-02-21 ENCOUNTER — HOSPITAL ENCOUNTER (OUTPATIENT)
Dept: CARDIAC CATH/INVASIVE PROCEDURES | Age: 58
Discharge: HOME | End: 2023-02-21
Attending: INTERNAL MEDICINE | Admitting: INTERNAL MEDICINE
Payer: COMMERCIAL

## 2023-02-21 ENCOUNTER — TELEPHONE (OUTPATIENT)
Dept: CARDIOLOGY CLINIC | Age: 58
End: 2023-02-21

## 2023-02-21 VITALS
HEART RATE: 55 BPM | OXYGEN SATURATION: 95 % | RESPIRATION RATE: 18 BRPM | TEMPERATURE: 96.2 F | SYSTOLIC BLOOD PRESSURE: 123 MMHG | HEIGHT: 63 IN | BODY MASS INDEX: 51.38 KG/M2 | DIASTOLIC BLOOD PRESSURE: 97 MMHG | WEIGHT: 290 LBS

## 2023-02-21 LAB
ALBUMIN SERPL-MCNC: 4.3 GM/DL (ref 3.4–5)
ALP BLD-CCNC: 42 IU/L (ref 40–128)
ALT SERPL-CCNC: 21 U/L (ref 10–40)
ANION GAP SERPL CALCULATED.3IONS-SCNC: 12 MMOL/L (ref 4–16)
APTT: 65.1 SECONDS (ref 25.1–37.1)
AST SERPL-CCNC: 31 IU/L (ref 15–37)
BILIRUB SERPL-MCNC: 0.5 MG/DL (ref 0–1)
BUN SERPL-MCNC: 22 MG/DL (ref 6–23)
CALCIUM SERPL-MCNC: 9.3 MG/DL (ref 8.3–10.6)
CHLORIDE BLD-SCNC: 99 MMOL/L (ref 99–110)
CO2: 26 MMOL/L (ref 21–32)
CREAT SERPL-MCNC: 1.1 MG/DL (ref 0.6–1.1)
GFR SERPL CREATININE-BSD FRML MDRD: 58 ML/MIN/1.73M2
GLUCOSE SERPL-MCNC: 116 MG/DL (ref 70–99)
HCT VFR BLD CALC: 49.9 % (ref 37–47)
HEMOGLOBIN: 16.5 GM/DL (ref 12.5–16)
INR BLD: 1.85 INDEX
MAGNESIUM: 2.4 MG/DL (ref 1.8–2.4)
MCH RBC QN AUTO: 28.6 PG (ref 27–31)
MCHC RBC AUTO-ENTMCNC: 33.1 % (ref 32–36)
MCV RBC AUTO: 86.6 FL (ref 78–100)
PDW BLD-RTO: 13.2 % (ref 11.7–14.9)
PHOSPHORUS: 3.2 MG/DL (ref 2.5–4.9)
PLATELET # BLD: 264 K/CU MM (ref 140–440)
PMV BLD AUTO: 9.6 FL (ref 7.5–11.1)
POTASSIUM SERPL-SCNC: 3.9 MMOL/L (ref 3.5–5.1)
PROTHROMBIN TIME: 24 SECONDS (ref 11.7–14.5)
RBC # BLD: 5.76 M/CU MM (ref 4.2–5.4)
SODIUM BLD-SCNC: 137 MMOL/L (ref 135–145)
TOTAL PROTEIN: 7 GM/DL (ref 6.4–8.2)
WBC # BLD: 6.1 K/CU MM (ref 4–10.5)

## 2023-02-21 PROCEDURE — 2580000003 HC RX 258: Performed by: NURSE ANESTHETIST, CERTIFIED REGISTERED

## 2023-02-21 PROCEDURE — 2500000003 HC RX 250 WO HCPCS: Performed by: NURSE ANESTHETIST, CERTIFIED REGISTERED

## 2023-02-21 PROCEDURE — 85027 COMPLETE CBC AUTOMATED: CPT

## 2023-02-21 PROCEDURE — 3700000001 HC ADD 15 MINUTES (ANESTHESIA)

## 2023-02-21 PROCEDURE — 85610 PROTHROMBIN TIME: CPT

## 2023-02-21 PROCEDURE — 6360000002 HC RX W HCPCS

## 2023-02-21 PROCEDURE — 80053 COMPREHEN METABOLIC PANEL: CPT

## 2023-02-21 PROCEDURE — 3700000000 HC ANESTHESIA ATTENDED CARE

## 2023-02-21 PROCEDURE — 85730 THROMBOPLASTIN TIME PARTIAL: CPT

## 2023-02-21 PROCEDURE — 92960 CARDIOVERSION ELECTRIC EXT: CPT

## 2023-02-21 PROCEDURE — 6360000002 HC RX W HCPCS: Performed by: NURSE ANESTHETIST, CERTIFIED REGISTERED

## 2023-02-21 PROCEDURE — 92960 CARDIOVERSION ELECTRIC EXT: CPT | Performed by: INTERNAL MEDICINE

## 2023-02-21 PROCEDURE — 83735 ASSAY OF MAGNESIUM: CPT

## 2023-02-21 PROCEDURE — 84100 ASSAY OF PHOSPHORUS: CPT

## 2023-02-21 PROCEDURE — 93005 ELECTROCARDIOGRAM TRACING: CPT | Performed by: INTERNAL MEDICINE

## 2023-02-21 RX ORDER — LIDOCAINE HYDROCHLORIDE 20 MG/ML
INJECTION, SOLUTION INFILTRATION; PERINEURAL PRN
Status: DISCONTINUED | OUTPATIENT
Start: 2023-02-21 | End: 2023-02-21

## 2023-02-21 RX ORDER — SODIUM CHLORIDE 9 MG/ML
INJECTION, SOLUTION INTRAVENOUS CONTINUOUS PRN
Status: DISCONTINUED | OUTPATIENT
Start: 2023-02-21 | End: 2023-02-21 | Stop reason: SDUPTHER

## 2023-02-21 RX ORDER — LIDOCAINE HYDROCHLORIDE 20 MG/ML
INJECTION, SOLUTION INFILTRATION; PERINEURAL PRN
Status: DISCONTINUED | OUTPATIENT
Start: 2023-02-21 | End: 2023-02-21 | Stop reason: SDUPTHER

## 2023-02-21 RX ORDER — PROPOFOL 10 MG/ML
INJECTION, EMULSION INTRAVENOUS PRN
Status: DISCONTINUED | OUTPATIENT
Start: 2023-02-21 | End: 2023-02-21 | Stop reason: SDUPTHER

## 2023-02-21 RX ORDER — AMIODARONE HYDROCHLORIDE 200 MG/1
200 TABLET ORAL DAILY
Qty: 30 TABLET | Refills: 3 | Status: SHIPPED | OUTPATIENT
Start: 2023-02-21

## 2023-02-21 RX ADMIN — SODIUM CHLORIDE: 900 INJECTION INTRAVENOUS at 11:34

## 2023-02-21 RX ADMIN — PROPOFOL 100 MG: 10 INJECTION, EMULSION INTRAVENOUS at 12:01

## 2023-02-21 RX ADMIN — LIDOCAINE HYDROCHLORIDE 50 MG: 20 INJECTION, SOLUTION INFILTRATION; PERINEURAL at 12:01

## 2023-02-21 NOTE — ANESTHESIA PRE PROCEDURE
Department of Anesthesiology  Preprocedure Note       Name:  Iva Meza   Age:  59 y.o.  :  1965                                          MRN:  [id number]         Date:  2023      Surgeon: * No surgeons listed *    Procedure: * No procedures listed *    Medications prior to admission:   Prior to Admission medications    Medication Sig Start Date End Date Taking? Authorizing Provider   rivaroxaban (XARELTO) 20 MG TABS tablet Take 1 tablet by mouth daily (with breakfast)  Patient not taking: Reported on 2023   Juan David Barrientos MD   Carboxymeth-Cellulose-CitricAc (PLENITY) CAPS Take 3 capsules by mouth 2 times daily (before meals) Take 3 capsules 30 minutes before lunch and dinner with 16 oz water each  Patient taking differently: Take 3 capsules by mouth 2 times daily (before meals) Take 3 capsules 30 minutes before lunch and dinner with 16 oz water each    Havent started yet.  23   EVE Clark CNP   potassium chloride (KLOR-CON M) 10 MEQ extended release tablet Take 1 tablet by mouth 2 times daily 23   Juan David Barrientos MD   metoprolol tartrate (LOPRESSOR) 25 MG tablet Take 1 tablet by mouth 2 times daily 23   EVE Humphries CNP   pantoprazole (PROTONIX) 40 MG tablet Take 1 tablet by mouth daily 23   EVE Humphries CNP   amiodarone (CORDARONE) 200 MG tablet Take 1 tablet by mouth 2 times daily for 28 doses 23  EVE Humphries CNP   amiodarone (CORDARONE) 200 MG tablet Take 1 tablet by mouth daily  Patient taking differently: Take 200 mg by mouth daily Will start 2023   EVE Humphries CNP   furosemide (LASIX) 40 MG tablet Take 1 tablet by mouth 2 times daily 1/10/23   EVE Martinez CNP   fenofibrate (TRICOR) 145 MG tablet Take 1 tablet by mouth daily 10/10/22   Juan David Barrientos MD   Compression Stockings MISC by Does not apply route Moderate Compression level  Patient taking differently: by Does not apply route Moderate Compression level- wears occasionally 4/25/16   Brian Wolf MD   FISH OIL by Does not apply route daily     Historical Provider, MD       Current medications:    No current facility-administered medications for this encounter. Allergies: Allergies   Allergen Reactions    Cozaar [Losartan Potassium] Other (See Comments)     Cough    Lisinopril Other (See Comments)     Cough    Pcn [Penicillins] Other (See Comments)     Unknown as a child think a rash       Problem List:    Patient Active Problem List   Diagnosis Code    Hyperlipidemia E78.5    Obesity, morbid, BMI 40.0-49.9 ([facility] E66.01    H/O echocardiogram H10.87    Metabolic syndrome J03.37    Venous stasis dermatitis of both lower extremities I87.2    Dependent edema R60.9    Atrial fibrillation with RVR ([facility]) I48.91    Essential hypertension I10    PAF (paroxysmal atrial fibrillation) ([facility]) I48.0    MARLINE treated with BiPAP G47.33    Neck pain on left side M54.2    Cervical radiculopathy M54.12    Left lateral epicondylitis M77.12    Anticoagulated Z79.01    Colon polyps K63.5    VHD (valvular heart disease) I38    S/P ablation of atrial fibrillation Z98.890, Z86.79       Past Medical History:        Diagnosis Date    Atrial fibrillation with RVR ([facility]) 07/01/2017    Bell palsy     right side of face affected    H/O 24 hour EKG monitoring 03/30/2010    holter: other then an episode of sinus tach at 150 bpm no other clinically significant arrhythmia seen. Symptoms reported in pts diary do not correlate with this episode    H/O cardiovascular stress test 01/14/2010    Joe protocol: normal stress study. gated study shows uniform wall motion with calculated LV EF of over 67 %    H/O echocardiogram 05/20/2010    ECHO:  There is no comparison study available. There is mild concentric LV hypertrophy.  LVSF is normal, EF = > 55 %    H/O echocardiogram 07/18/2014    EF55%, borderline left atrial enlargement, normal LV systolic function, mild mitral regurg, no pericardial effusion.  H/O echocardiogram 2017    EF55%  AV mild  sclerotic    H/O echocardiogram 2018    EF55-60% sclerotic non stenosed AV    H/O transesophageal echocardiography (ANA) for monitoring 2022    Calcification noted on the non-coronary cusp of the aortic valve. MILD MR.    Heart palpitations     History of cardiac monitoring 2014    Event - no clinically significant arrythmias    History of cardiac monitoring 08/10/2017    No clinically signifacant arrythmias    History of complete ECG 2010    HX OTHER MEDICAL     \"not sure if have sleep apnea- have cpap but the last sleep study not conclusive so having another sleep study done at [facility] a cpap but never offically dx with sleep apnea\"    Hyperlipidemia 2010    Hypertension     follows with Dr Allison Morin S/P ablation of atrial fibrillation 2023    S/P atrial fibrillation ablation performed by Dr. Rose Mary Herbert.        Past Surgical History:        Procedure Laterality Date   [address]    times 2    COLONOSCOPY  2016    COLONOSCOPY N/A 11/15/2018    COLONOSCOPY POLYPECTOMY ABLATION/SNARE/HEMOCLIPS X4 TO PROXIMAL ASCENDING POLYPECTOMY SITE performed by Carmelina Osman MD at [address] [place] [address], [place], DIAGNOSTIC  2000    EYE SURGERY  2016    gavino cataract ext    ROTATOR CUFF REPAIR Right 10/2012       Social History:    Social History     Tobacco Use    Smoking status: Former     Packs/day: 0.50     Years: 30.00     Pack years: 15.00     Types: Cigarettes     Quit date: 2002     Years since quittin.6    Smokeless tobacco: Never   Substance Use Topics    Alcohol use: No     Comment: NO CAFFEINE                                Counseling given: Not Answered      Vital Signs (Current):   Vitals:    23 1013   BP: (!) 141/99   Pulse: 97   Resp: 23   Temp: (!) 35.7 °C (96.2 °F)   TempSrc: Temporal SpO2: 95%   Weight: 290 lb (131.5 kg)   Height: 5' 3\" (1.6 m)                                              BP Readings from Last 3 Encounters:   02/21/23 (!) 141/99   02/10/23 138/84   02/08/23 (!) 144/84       NPO Status: Time of last liquid consumption: 0730 (sips with meds)                        Time of last solid consumption: 2000                        Date of last liquid consumption: 02/21/23                        Date of last solid food consumption: 02/20/23    BMI:   Wt Readings from Last 3 Encounters:   02/21/23 290 lb (131.5 kg)   02/10/23 293 lb (132.9 kg)   02/08/23 293 lb 8 oz (133.1 kg)     Body mass index is 51.37 kg/m². CBC:   Lab Results   Component Value Date/Time    WBC 6.3 01/27/2023 11:36 AM    RBC 5.83 01/27/2023 11:36 AM    HGB 17.0 02/01/2023 09:08 AM    HCT 50.0 02/01/2023 09:08 AM    MCV 91.4 01/27/2023 11:36 AM    RDW 13.1 01/27/2023 11:36 AM     01/27/2023 11:36 AM       CMP:   Lab Results   Component Value Date/Time     02/01/2023 09:08 AM    K 4.0 02/01/2023 09:08 AM     01/27/2023 11:36 AM    CO2 30 02/01/2023 09:08 AM    BUN 25 01/27/2023 11:36 AM    CREATININE 1.1 02/01/2023 09:08 AM    CREATININE 1.2 01/27/2023 11:36 AM    GFRAA >60 03/09/2022 11:03 AM    AGRATIO 2.3 04/10/2018 11:40 AM    LABGLOM 52 01/27/2023 11:36 AM    GLUCOSE 89 01/27/2023 11:36 AM    PROT 6.8 01/20/2020 09:23 AM    CALCIUM 10.0 01/27/2023 11:36 AM    BILITOT 0.4 01/20/2020 09:23 AM    ALKPHOS 33 01/20/2020 09:23 AM    AST 19 01/20/2020 09:23 AM    ALT 15 01/20/2020 09:23 AM       POC Tests: No results for input(s): POCGLU, POCNA, POCK, POCCL, POCBUN, POCHEMO, POCHCT in the last 72 hours.     Coags:   Lab Results   Component Value Date/Time    PROTIME 25.4 01/27/2023 11:36 AM    INR 1.96 01/27/2023 11:36 AM    APTT 77.4 01/27/2023 11:36 AM       HCG (If Applicable):   Lab Results   Component Value Date    PREGTESTUR NEGATIVE 11/15/2018        ABGs:   Lab Results   Component Value Date/Time    PO2ART 442.3 02/01/2023 09:08 AM    EEX1DFF 50.9 02/01/2023 09:08 AM    WPX3JQY 28.3 02/01/2023 09:08 AM        Type & Screen (If Applicable):  No results found for: LABABO, LABRH    Drug/Infectious Status (If Applicable):  No results found for: HIV, HEPCAB    COVID-19 Screening (If Applicable):   Lab Results   Component Value Date/Time    COVID19 NOT DETECTED 02/16/2023 12:00 PM           Anesthesia Evaluation  Patient summary reviewed no history of anesthetic complications:   Airway: Mallampati: I  TM distance: >3 FB   Neck ROM: full  Mouth opening: > = 3 FB   Dental: normal exam         Pulmonary:normal exam    (+) sleep apnea: on CPAP,                            ROS comment: Former smoker   Cardiovascular:  Exercise tolerance: poor (<4 METS),   (+) hypertension:, dysrhythmias (s/p ablation): atrial fibrillation, hyperlipidemia         Beta Blocker:  Dose within 24 Hrs         Neuro/Psych:   (+) neuromuscular disease:,             GI/Hepatic/Renal:   (+) morbid obesity (super morbid BMI 51)          Endo/Other:    (+) blood dyscrasia: anticoagulation therapy:., .                 Abdominal:   (+) obese,           Vascular: negative vascular ROS. Other Findings:           Anesthesia Plan      MAC     ASA 4       Induction: intravenous. Anesthetic plan and risks discussed with patient. EVE Malcolm CRNA   2/21/2023        Pre Anesthesia Evaluation complete. Anesthesia plan, risks, benefits, alternatives, and personnel discussed with patient and/or legal guardian. Patient and/or legal guardian verbalized an understanding and agreed to proceed. Anesthesia plan discussed with care team members and agreed upon.   EVE Malcolm CRNA  2/21/2023

## 2023-02-21 NOTE — ANESTHESIA PRE PROCEDURE
Department of Anesthesiology  Preprocedure Note       Name:  Marielena Fox   Age:  59 y.o.  :  1965                                          MRN:  [id number]         Date:  2023      Surgeon: * No surgeons listed *    Procedure: * No procedures listed *    Medications prior to admission:   Prior to Admission medications    Medication Sig Start Date End Date Taking? Authorizing Provider   rivaroxaban (XARELTO) 20 MG TABS tablet Take 1 tablet by mouth daily (with breakfast)  Patient not taking: Reported on 2023   Tamara Givens MD   Carboxymeth-Cellulose-CitricAc (PLENITY) CAPS Take 3 capsules by mouth 2 times daily (before meals) Take 3 capsules 30 minutes before lunch and dinner with 16 oz water each  Patient taking differently: Take 3 capsules by mouth 2 times daily (before meals) Take 3 capsules 30 minutes before lunch and dinner with 16 oz water each    Havent started yet.  23   EVE Bernal CNP   potassium chloride (KLOR-CON M) 10 MEQ extended release tablet Take 1 tablet by mouth 2 times daily 23   Tamara Givens MD   metoprolol tartrate (LOPRESSOR) 25 MG tablet Take 1 tablet by mouth 2 times daily 23   EVE Matute CNP   pantoprazole (PROTONIX) 40 MG tablet Take 1 tablet by mouth daily 23   EVE Matute CNP   amiodarone (CORDARONE) 200 MG tablet Take 1 tablet by mouth 2 times daily for 28 doses 23  EVE Matute CNP   amiodarone (CORDARONE) 200 MG tablet Take 1 tablet by mouth daily  Patient taking differently: Take 200 mg by mouth daily Will start 2023   EVE Matute CNP   furosemide (LASIX) 40 MG tablet Take 1 tablet by mouth 2 times daily 1/10/23   EVE Centeno CNP   fenofibrate (TRICOR) 145 MG tablet Take 1 tablet by mouth daily 10/10/22   Tamara Givens MD   Compression Stockings MISC by Does not apply route Moderate Compression level  Patient taking differently: by Does not apply route Moderate Compression level- wears occasionally 4/25/16   Martina Burk MD   FISH OIL by Does not apply route daily     Historical Provider, MD       Current medications:    No current facility-administered medications for this visit. No current outpatient medications on file. Facility-Administered Medications Ordered in Other Visits   Medication Dose Route Frequency Provider Last Rate Last Admin    0.9 % sodium chloride infusion   IntraVENous Continuous PRN Nan Cecilia, APRN - CRNA   New Bag at 02/21/23 1134       Allergies: Allergies   Allergen Reactions    Cozaar [Losartan Potassium] Other (See Comments)     Cough    Lisinopril Other (See Comments)     Cough    Pcn [Penicillins] Other (See Comments)     Unknown as a child think a rash       Problem List:    Patient Active Problem List   Diagnosis Code    Hyperlipidemia E78.5    Obesity, morbid, BMI 40.0-49.9 ([facility] [address] E66.01    H/O echocardiogram M68.00    Metabolic syndrome Q20.67    Venous stasis dermatitis of both lower extremities I87.2    Dependent edema R60.9    Atrial fibrillation with RVR ([facility]) I48.91    Essential hypertension I10    PAF (paroxysmal atrial fibrillation) ([facility]) I48.0    MARLINE treated with BiPAP G47.33    Neck pain on left side M54.2    Cervical radiculopathy M54.12    Left lateral epicondylitis M77.12    Anticoagulated Z79.01    Colon polyps K63.5    VHD (valvular heart disease) I38    S/P ablation of atrial fibrillation Z98.890, Z86.79       Past Medical History:        Diagnosis Date    Atrial fibrillation with RVR ([facility]) 07/01/2017    Bell palsy     right side of face affected    H/O 24 hour EKG monitoring 03/30/2010    holter: other then an episode of sinus tach at 150 bpm no other clinically significant arrhythmia seen. Symptoms reported in pts diary do not correlate with this episode    H/O cardiovascular stress test 01/14/2010    Joe protocol: normal stress study.  gated study shows uniform wall motion with calculated LV EF of over 67 %    H/O echocardiogram 2010    ECHO:  There is no comparison study available. There is mild concentric LV hypertrophy. LVSF is normal, EF = > 55 %    H/O echocardiogram 2014    EF55%, borderline left atrial enlargement, normal LV systolic function, mild mitral regurg, no pericardial effusion.  H/O echocardiogram 2017    EF55%  AV mild  sclerotic    H/O echocardiogram 2018    EF55-60% sclerotic non stenosed AV    H/O transesophageal echocardiography (ANA) for monitoring 2022    Calcification noted on the non-coronary cusp of the aortic valve. MILD MR.    Heart palpitations     History of cardiac monitoring 2014    Event - no clinically significant arrythmias    History of cardiac monitoring 08/10/2017    No clinically signifacant arrythmias    History of complete ECG 2010    HX OTHER MEDICAL     \"not sure if have sleep apnea- have cpap but the last sleep study not conclusive so having another sleep study done at [facility] a cpap but never offically dx with sleep apnea\"    Hyperlipidemia 2010    Hypertension     follows with Dr Nataliia Abdi S/P ablation of atrial fibrillation 2023    S/P atrial fibrillation ablation performed by Dr. Leora Rosa.        Past Surgical History:        Procedure Laterality Date   [address]    times 2    COLONOSCOPY  2016    COLONOSCOPY N/A 11/15/2018    COLONOSCOPY POLYPECTOMY ABLATION/SNARE/HEMOCLIPS X4 TO PROXIMAL ASCENDING POLYPECTOMY SITE performed by Ajay Mcmanus MD at [address] [place] [address], [place], DIAGNOSTIC  2000    EYE SURGERY  2016    gavino cataract ext    ROTATOR CUFF REPAIR Right 10/2012       Social History:    Social History     Tobacco Use    Smoking status: Former     Packs/day: 0.50     Years: 30.00     Pack years: 15.00     Types: Cigarettes     Quit date: 2002     Years since quittin.6    Smokeless tobacco: Never   Substance Use Topics    Alcohol use: No     Comment: NO CAFFEINE                                Counseling given: Not Answered      Vital Signs (Current): There were no vitals filed for this visit. BP Readings from Last 3 Encounters:   02/21/23 (!) 141/99   02/10/23 138/84   02/08/23 (!) 144/84       NPO Status:                                                                                 BMI:   Wt Readings from Last 3 Encounters:   02/21/23 290 lb (131.5 kg)   02/10/23 293 lb (132.9 kg)   02/08/23 293 lb 8 oz (133.1 kg)     There is no height or weight on file to calculate BMI.    CBC:   Lab Results   Component Value Date/Time    WBC 6.1 02/21/2023 10:00 AM    RBC 5.76 02/21/2023 10:00 AM    HGB 16.5 02/21/2023 10:00 AM    HCT 49.9 02/21/2023 10:00 AM    MCV 86.6 02/21/2023 10:00 AM    RDW 13.2 02/21/2023 10:00 AM     02/21/2023 10:00 AM       CMP:   Lab Results   Component Value Date/Time     02/21/2023 10:00 AM    K 3.9 02/21/2023 10:00 AM    CL 99 02/21/2023 10:00 AM    CO2 26 02/21/2023 10:00 AM    BUN 22 02/21/2023 10:00 AM    CREATININE 1.1 02/21/2023 10:00 AM    GFRAA >60 03/09/2022 11:03 AM    AGRATIO 2.3 04/10/2018 11:40 AM    LABGLOM 58 02/21/2023 10:00 AM    GLUCOSE 116 02/21/2023 10:00 AM    PROT 7.0 02/21/2023 10:00 AM    CALCIUM 9.3 02/21/2023 10:00 AM    BILITOT 0.5 02/21/2023 10:00 AM    ALKPHOS 42 02/21/2023 10:00 AM    AST 31 02/21/2023 10:00 AM    ALT 21 02/21/2023 10:00 AM       POC Tests: No results for input(s): POCGLU, POCNA, POCK, POCCL, POCBUN, POCHEMO, POCHCT in the last 72 hours.     Coags:   Lab Results   Component Value Date/Time    PROTIME 24.0 02/21/2023 10:00 AM    INR 1.85 02/21/2023 10:00 AM    APTT 65.1 02/21/2023 10:00 AM       HCG (If Applicable):   Lab Results   Component Value Date    PREGTESTUR NEGATIVE 11/15/2018        ABGs:   Lab Results   Component Value Date/Time    PO2ART 442.3 02/01/2023 09:08 AM JKM5EKI 50.9 02/01/2023 09:08 AM    CGI5BVN 28.3 02/01/2023 09:08 AM        Type & Screen (If Applicable):  No results found for: LABABO, LABRH    Drug/Infectious Status (If Applicable):  No results found for: HIV, HEPCAB    COVID-19 Screening (If Applicable):   Lab Results   Component Value Date/Time    COVID19 NOT DETECTED 02/16/2023 12:00 PM           Anesthesia Evaluation  Patient summary reviewed no history of anesthetic complications:   Airway: Mallampati: I  TM distance: >3 FB   Neck ROM: full  Mouth opening: > = 3 FB   Dental: normal exam         Pulmonary:normal exam    (+) sleep apnea: on CPAP,                            ROS comment: Former smoker   Cardiovascular:  Exercise tolerance: poor (<4 METS),   (+) hypertension:, valvular problems/murmurs: MR, dysrhythmias (s/p ablation): atrial fibrillation, hyperlipidemia         Beta Blocker:  Dose within 24 Hrs         Neuro/Psych:   (+) neuromuscular disease:,              ROS comment: Blees palsy on right sided face and mouth  GI/Hepatic/Renal:   (+) morbid obesity (super morbid BMI 51)          Endo/Other:    (+) blood dyscrasia: anticoagulation therapy:., .                 Abdominal:   (+) obese,           Vascular: negative vascular ROS. Other Findings:             Anesthesia Plan      MAC     ASA 4       Induction: intravenous. Anesthetic plan and risks discussed with patient. Plan discussed with CRNA. EVE Wilhelm CRNA   2/21/2023        Pre Anesthesia Evaluation complete. Anesthesia plan, risks, benefits, alternatives, and personnel discussed with patient and/or legal guardian. Patient and/or legal guardian verbalized an understanding and agreed to proceed. Anesthesia plan discussed with care team members and agreed upon.   EVE Wilhelm CRNA  2/21/2023

## 2023-02-21 NOTE — PROCEDURES
[facility]     Electrophysiology Procedure Note       Date of Procedure: 2/21/2023  Patient's Name: Saadia Boothe  YOB: 1965   Medical Record Number: [id number]    Procedure Performed by: Solis Sanchez MD    Sedation: Anesthesia    Procedures performed:    External Electrical cardioversion    Indication of the procedure: Persistent Atrial fibrillation     Saadia Boothe is a 59 y.o. female with symptomatic atrial fibrillation     Details of procedure: The patient was brought to the cath lab area in a fasting and non-sedated state. The risks, benefits and alternatives of the procedure were discussed with the patient. The patient opted to proceed with the procedure. Written informed consent was signed and placed in the chart. A timeout protocol was completed to identify the patient and the procedure being performed. Sedation per anesthesia and ANA which was performed recently which did not show any ZO/LA clot/thrombus. Patient has uninterrupted anticoagulation. So no new ANA was needed again. After confirming sedation and electrical DC cardioversion was perfomred using 300J, (because of body habitus) synchronized shock. Patient was converted to sinus rhythm. The patient tolerated the procedure well and there were no complications. Conclusion:   Successful external DC cardioversion of atrial fibrillation. Plan:     The patient can be discharged if remains stable. Will continue with medical therapy.

## 2023-02-21 NOTE — H&P
Electrophysiology H&p  Note      Reason for consultation: Atrial fibrillation    Chief complaint:  here for cardioversion    Referring physician:        Primary care physician: No primary care provider on file. History of Present Illness: Today visit (02/21/23)    Patient here for Cardioversion. No change in H&P noted from previous clinic visit. Previous visit 2/10/2023  Patient here today for follow-up on ablation of atrial fibrillation. Patient reports that she has had 2 episodes of dizziness. She states that it came on suddenly after taking her morning medications. She states both episodes lasted about 3 hours and then resolved on their own. Patient recently had ablation for atrial fibrillation. Patient unfortunately went back into atrial fibrillation post ablation. Patient was started on amiodarone. Patient denies chest pain, shortness of breath, edema or syncope      Previous visit (12/29/2022)      Chief Complaint   Patient presents with    Consultation     Lulu Mensah- thinks its due to being overweight  Palpitations- Pt is having palpitations, they are constant but not painful like they were last time she had them  No nicotine, no alcohol  Caffeine- rarely- hardly ever  Exercise- no    Shortness of Breath    Palpitations           Previous visit: (10/27/2017      Chief Complaint   Patient presents with    Atrial Fibrillation     Pt states palpitations happen rarely. Pt denies chest pain, shortness of breath, dizziness, and edema. Previous visit:    Chief Complaint   Patient presents with    Atrial Fibrillation     MO-discuss afib ablation. Attended class on 7/14. Patient denies CP, dizziness, edema. She does report palpitations constantly as well as SOB at times due to the palpitations. Patient is still wearing event monitor, it is due to come off tomorrow. Patient was in the hospital in Atrial fibrillation in July.  Denies syncope  On medications last few days she has been much better. Patient has symptoms of sleep apnea - snoring - tired when awake and sleeps in the day. Past medical history:   Past Medical History:   Diagnosis Date    Atrial fibrillation with RVR ([address] 07/01/2017    Bell palsy     right side of face affected    H/O 24 hour EKG monitoring 03/30/2010    holter: other then an episode of sinus tach at 150 bpm no other clinically significant arrhythmia seen. Symptoms reported in pts diary do not correlate with this episode    H/O cardiovascular stress test 01/14/2010    Joe protocol: normal stress study. gated study shows uniform wall motion with calculated LV EF of over 67 %    H/O echocardiogram 05/20/2010    ECHO:  There is no comparison study available. There is mild concentric LV hypertrophy. LVSF is normal, EF = > 55 %    H/O echocardiogram 07/18/2014    EF55%, borderline left atrial enlargement, normal LV systolic function, mild mitral regurg, no pericardial effusion. H/O echocardiogram 07/03/2017    EF55%  AV mild  sclerotic    H/O echocardiogram 11/07/2018    EF55-60% sclerotic non stenosed AV    H/O transesophageal echocardiography (ANA) for monitoring 12/14/2022    Calcification noted on the non-coronary cusp of the aortic valve. MILD MR. Heart palpitations     History of cardiac monitoring 07/21/2014    Event - no clinically significant arrythmias    History of cardiac monitoring 08/10/2017    No clinically signifacant arrythmias    History of complete ECG 05/11/2010    HX OTHER MEDICAL     \"not sure if have sleep apnea- have cpap but the last sleep study not conclusive so having another sleep study done at [facility] a cpap but never offically dx with sleep apnea\"    Hyperlipidemia 04/2010    Hypertension     follows with Dr Virginia Monique    Obesity     S/P ablation of atrial fibrillation 02/01/2023    S/P atrial fibrillation ablation performed by Dr. Jude Bella.        Surgical history : Past Surgical History:   Procedure Laterality Date    [address] [facility]    times 2    COLONOSCOPY  2016    COLONOSCOPY N/A 11/15/2018    COLONOSCOPY POLYPECTOMY ABLATION/SNARE/HEMOCLIPS X4 TO PROXIMAL ASCENDING POLYPECTOMY SITE performed by Kevin Toledo MD at [facility], [place], DIAGNOSTIC  2000    EYE SURGERY  2016    gavino cataract ext    ROTATOR CUFF REPAIR Right 10/2012       Family history:   Family History   Problem Relation Age of Onset    High Blood Pressure Mother     Cancer Mother         lung ca    Heart Disease Father     Heart Surgery Father         cabg x 2    Other Father         AAA    Diabetes Father     Heart Attack Brother     Breast Cancer Neg Hx        Social history :  reports that she quit smoking about 20 years ago. Her smoking use included cigarettes. She has a 15.00 pack-year smoking history. She has never used smokeless tobacco. She reports that she does not drink alcohol and does not use drugs. Allergies   Allergen Reactions    Cozaar [Losartan Potassium] Other (See Comments)     Cough    Lisinopril Other (See Comments)     Cough    Pcn [Penicillins] Other (See Comments)     Unknown as a child think a rash       No current facility-administered medications on file prior to encounter.      Current Outpatient Medications on File Prior to Encounter   Medication Sig Dispense Refill    rivaroxaban (XARELTO) 20 MG TABS tablet Take 1 tablet by mouth daily (with breakfast) 90 tablet 1    Carboxymeth-Cellulose-CitricAc (PLENITY) CAPS Take 3 capsules by mouth 2 times daily (before meals) Take 3 capsules 30 minutes before lunch and dinner with 16 oz water each (Patient taking differently: Take 3 capsules by mouth 2 times daily (before meals) Take 3 capsules 30 minutes before lunch and dinner with 16 oz water each    Havent started yet.) 180 capsule 0    potassium chloride (KLOR-CON M) 10 MEQ extended release tablet Take 1 tablet by mouth 2 times daily 180 tablet 3 metoprolol tartrate (LOPRESSOR) 25 MG tablet Take 1 tablet by mouth 2 times daily 60 tablet 1    pantoprazole (PROTONIX) 40 MG tablet Take 1 tablet by mouth daily 30 tablet 0    amiodarone (CORDARONE) 200 MG tablet Take 1 tablet by mouth 2 times daily for 28 doses 28 tablet 0    amiodarone (CORDARONE) 200 MG tablet Take 1 tablet by mouth daily (Patient taking differently: Take 200 mg by mouth daily Will start 2/16/2023) 90 tablet 1    furosemide (LASIX) 40 MG tablet Take 1 tablet by mouth 2 times daily 60 tablet 3    fenofibrate (TRICOR) 145 MG tablet Take 1 tablet by mouth daily 90 tablet 3    Compression Stockings MISC by Does not apply route Moderate Compression level (Patient taking differently: by Does not apply route Moderate Compression level- wears occasionally) 2 each 0    FISH OIL by Does not apply route daily          Review of Systems:   Review of Systems   Constitutional: Positive for fatigue. Negative for activity change, chills and fever. HENT: Negative for congestion, ear pain and tinnitus. Eyes: Negative for photophobia, pain and visual disturbance. Respiratory:  Negative for cough, chest tightness and wheezing. Cardiovascular: Positive for palpitations. Negative for chest pain and leg swelling. Shortness of breath with exertion  Gastrointestinal: Negative for abdominal pain, blood in stool, constipation, diarrhea, nausea and vomiting. Endocrine: Negative for cold intolerance and heat intolerance. Genitourinary: Negative for dysuria, flank pain and hematuria. Musculoskeletal: Negative for arthralgias, back pain, myalgias and neck stiffness. Skin: Negative for color change and rash. Allergic/Immunologic: Negative for food allergies. Neurological:postive for dizziness, negative light-headedness, numbness and headaches. Hematological: Does not bruise/bleed easily. Psychiatric/Behavioral: Negative for agitation, behavioral problems and confusion.            Physical Examination:      Vitals:    02/21/23 1013    BP: (!) 141/99    Pulse: 97    Resp: 23    Temp: (!) 96.2 °F (35.7 °C)    TempSrc: Temporal    SpO2: 95%    Weight: 290 lb (131.5 kg)    Height: 5' 3\" (1.6 m)           Physical Exam   Constitutional: She is oriented to person, place, and time and well-developed, well-nourished, and in no distress. HENT:   Head: Normocephalic and atraumatic. Eyes: Conjunctivae are normal. Pupils are equal, round, and reactive to light. Right and left eye exhibits no discharge. Neck: Normal range of motion. No JVD present. No thyromegaly present. Cardiovascular: irregular rhythm   Pulmonary/Chest: Effort normal and breath sounds normal. No stridor. No respiratory distress. She has no wheezes. Abdominal: Soft. Bowel sounds are normal. She exhibits no distension. There is no tenderness. Musculoskeletal: Normal range of motion. She exhibits no edema or tenderness. Neurological: She is alert and oriented to person, place, and time. She displays normal reflexes. No cranial nerve deficit. Coordination normal.   Skin: Skin is warm and dry. No rash noted. No erythema. Bilateral femoral groin sites soft, nontender, no hematoma.    Psychiatric: Mood and affect normal.             CBC:   Lab Results   Component Value Date/Time    WBC 6.3 01/27/2023 11:36 AM    HGB 17.0 02/01/2023 09:08 AM    HCT 50.0 02/01/2023 09:08 AM     01/27/2023 11:36 AM     Lipids:   Lab Results   Component Value Date    CHOL 155 03/29/2021    TRIG 102 03/29/2021    HDL 39 (L) 03/29/2021    LDLCALC 96 03/29/2021    LDLDIRECT 88 01/20/2020     PT/INR:   Lab Results   Component Value Date/Time    INR 1.96 01/27/2023 11:36 AM        BMP:    Lab Results   Component Value Date     02/01/2023    K 4.0 02/01/2023     01/27/2023    CO2 30 02/01/2023    BUN 25 (H) 01/27/2023     CMP:   Lab Results   Component Value Date    AST 19 07/01/2017    PROT 7.0 07/01/2017    BILITOT 0.4 07/01/2017    ALKPHOS 61 07/01/2017     TSH:    Lab Results   Component Value Date/Time    TSH 3.54 10/26/2016 12:07 PM       EKGINTERPRETATION - EKG Interpretation:  atrial fibrillation        IMPRESSION / RECOMMENDATIONS:     1. PAF- s/p ablation of paroxysmal atrial fibrillation  2. Morbid obesity BMI 50  3. Sleep apnea  4. HTN  5. hld  6. Metabolic syndrome  7. Hypercoagulable due to PAF    EKG obtained and reviewed patient noted to be in atrial fibrillation  Patient to complete amiodarone 200 mg twice daily for 14 days then amiodarone 200 mg daily  Patient remains in atrial fibrillation at this time. We will schedule patient for cardioversion at earliest convenience  Patient does have underlying bradycardia when in sinus rhythm. Discussed with patient about possibility of needing pacemaker. Patient voiced understanding    Patient denies issues with bleeding. We will continue Xarelto 20 mg daily    There were no vitals filed for this visit.     Pressure is stable patient to continue Lopressor 25 mg twice daily and Lasix 40 mg daily        Roxanne Peterson MD

## 2023-02-21 NOTE — ANESTHESIA POSTPROCEDURE EVALUATION
Department of Anesthesiology  Postprocedure Note    Patient: Kathia Chavez  MRN: [id number]  YOB: 1965  Date of evaluation: 2/21/2023      Procedure Summary     Date: 02/21/23 Room / Location: [address] [place] [address] Cath Lab    Anesthesia Start: 1152 Anesthesia Stop: 6520    Procedure: CATH LAB TRANS ESOPHAGEAL-ANA Diagnosis:     Scheduled Providers:  Responsible Provider: Alondra Amato DO    Anesthesia Type: MAC ASA Status: 4          Anesthesia Type: No value filed.     Zak Phase I: Zak Score: 10    Zak Phase II:  10      Anesthesia Post Evaluation    Patient location during evaluation: bedside  Patient participation: complete - patient participated  Level of consciousness: awake and alert  Pain score: 0  Airway patency: patent  Nausea & Vomiting: no nausea and no vomiting  Complications: no  Cardiovascular status: hemodynamically stable  Respiratory status: acceptable, room air, spontaneous ventilation and nonlabored ventilation  Hydration status: euvolemic

## 2023-02-22 LAB
EKG ATRIAL RATE: 326 BPM
EKG ATRIAL RATE: 59 BPM
EKG DIAGNOSIS: NORMAL
EKG DIAGNOSIS: NORMAL
EKG P AXIS: 33 DEGREES
EKG P-R INTERVAL: 220 MS
EKG Q-T INTERVAL: 332 MS
EKG Q-T INTERVAL: 478 MS
EKG QRS DURATION: 102 MS
EKG QRS DURATION: 104 MS
EKG QTC CALCULATION (BAZETT): 449 MS
EKG QTC CALCULATION (BAZETT): 473 MS
EKG R AXIS: 19 DEGREES
EKG R AXIS: 28 DEGREES
EKG T AXIS: 42 DEGREES
EKG T AXIS: 60 DEGREES
EKG VENTRICULAR RATE: 110 BPM
EKG VENTRICULAR RATE: 59 BPM

## 2023-02-22 PROCEDURE — 93010 ELECTROCARDIOGRAM REPORT: CPT | Performed by: INTERNAL MEDICINE

## 2023-02-23 ENCOUNTER — TELEPHONE (OUTPATIENT)
Dept: CARDIOLOGY CLINIC | Age: 58
End: 2023-02-23

## 2023-02-28 ENCOUNTER — OFFICE VISIT (OUTPATIENT)
Dept: CARDIOLOGY CLINIC | Age: 58
End: 2023-02-28
Payer: COMMERCIAL

## 2023-02-28 VITALS
HEART RATE: 62 BPM | OXYGEN SATURATION: 97 % | HEIGHT: 64 IN | WEIGHT: 290.2 LBS | DIASTOLIC BLOOD PRESSURE: 82 MMHG | SYSTOLIC BLOOD PRESSURE: 136 MMHG | BODY MASS INDEX: 49.54 KG/M2

## 2023-02-28 DIAGNOSIS — I10 ESSENTIAL HYPERTENSION: ICD-10-CM

## 2023-02-28 DIAGNOSIS — Z98.890 S/P ABLATION OF ATRIAL FIBRILLATION: ICD-10-CM

## 2023-02-28 DIAGNOSIS — D68.69 HYPERCOAGULABILITY DUE TO ATRIAL FIBRILLATION (HCC): ICD-10-CM

## 2023-02-28 DIAGNOSIS — I48.91 HYPERCOAGULABILITY DUE TO ATRIAL FIBRILLATION (HCC): ICD-10-CM

## 2023-02-28 DIAGNOSIS — I48.0 PAROXYSMAL ATRIAL FIBRILLATION (HCC): Primary | ICD-10-CM

## 2023-02-28 DIAGNOSIS — Z86.79 S/P ABLATION OF ATRIAL FIBRILLATION: ICD-10-CM

## 2023-02-28 PROCEDURE — 99214 OFFICE O/P EST MOD 30 MIN: CPT | Performed by: NURSE PRACTITIONER

## 2023-02-28 PROCEDURE — 3075F SYST BP GE 130 - 139MM HG: CPT | Performed by: NURSE PRACTITIONER

## 2023-02-28 PROCEDURE — 3079F DIAST BP 80-89 MM HG: CPT | Performed by: NURSE PRACTITIONER

## 2023-02-28 PROCEDURE — 93000 ELECTROCARDIOGRAM COMPLETE: CPT | Performed by: NURSE PRACTITIONER

## 2023-02-28 NOTE — PROGRESS NOTES
Electrophysiology Follow up  Note      Reason for consultation: Atrial fibrillation    Chief complaint:  1 week follow up s/p cardioversion      Referring physician:        Primary care physician: No primary care provider on file. History of Present Illness: This visit 2/28/2023  Patient here today for follow-up on cardioversion. Patient reports she feels well. She denies chest pain, palpitations, shortness of breath, lightheadedness, dizziness, edema or syncope    Previous visit 2/10/2023  Patient here today for follow-up on ablation of atrial fibrillation. Patient reports that she has had 2 episodes of dizziness. She states that it came on suddenly after taking her morning medications. She states both episodes lasted about 3 hours and then resolved on their own. Patient recently had ablation for atrial fibrillation. Patient unfortunately went back into atrial fibrillation post ablation. Patient was started on amiodarone. Patient denies chest pain, shortness of breath, edema or syncope      Previous visit (12/29/2022)      Chief Complaint   Patient presents with    Consultation     Grazyna Rizvi- thinks its due to being overweight  Palpitations- Pt is having palpitations, they are constant but not painful like they were last time she had them  No nicotine, no alcohol  Caffeine- rarely- hardly ever  Exercise- no    Shortness of Breath    Palpitations           Previous visit: (10/27/2017      Chief Complaint   Patient presents with    Atrial Fibrillation     Pt states palpitations happen rarely. Pt denies chest pain, shortness of breath, dizziness, and edema. Previous visit:    Chief Complaint   Patient presents with    Atrial Fibrillation     MO-discuss afib ablation. Attended class on 7/14. Patient denies CP, dizziness, edema. She does report palpitations constantly as well as SOB at times due to the palpitations.  Patient is still wearing event monitor, it is due to come off tomorrow.  Patient was in the hospital in Atrial fibrillation in July. Denies syncope  On medications last few days she has been much better.        Patient has symptoms of sleep apnea - snoring - tired when awake and sleeps in the day.     Past medical history:   Past Medical History:   Diagnosis Date    Atrial fibrillation with RVR ([facility]) 07/01/2017    Bell palsy     right side of face affected    H/O 24 hour EKG monitoring 03/30/2010    holter: other then an episode of sinus tach at 150 bpm no other clinically significant arrhythmia seen.  Symptoms reported in pts diary do not correlate with this episode    H/O cardiovascular stress test 01/14/2010    Joe protocol: normal stress study. gated study shows uniform wall motion with calculated LV EF of over 67 %    H/O echocardiogram 05/20/2010    ECHO:  There is no comparison study available. There is mild concentric LV hypertrophy. LVSF is normal, EF = > 55 %    H/O echocardiogram 07/18/2014    EF55%, borderline left atrial enlargement, normal LV systolic function, mild mitral regurg, no pericardial effusion.    H/O echocardiogram 07/03/2017    EF55%  AV mild  sclerotic    H/O echocardiogram 11/07/2018    EF55-60% sclerotic non stenosed AV    H/O transesophageal echocardiography (ANA) for monitoring 12/14/2022    Calcification noted on the non-coronary cusp of the aortic valve. MILD MR.    Heart palpitations     History of cardiac monitoring 07/21/2014    Event - no clinically significant arrythmias    History of cardiac monitoring 08/10/2017    No clinically signifacant arrythmias    History of complete ECG 05/11/2010    HX OTHER MEDICAL     \"not sure if have sleep apnea- have cpap but the last sleep study not conclusive so having another sleep study done at [facility]\"\"have a cpap but never offically dx with sleep apnea\"    Hyperlipidemia 04/2010    Hypertension     follows with Dr Lagos    Obesity     S/P ablation  of atrial fibrillation 2023    S/P atrial fibrillation ablation performed by Dr. Nixon Ceja. Surgical history :   Past Surgical History:   Procedure Laterality Date    CARDIOVERSION  2023     SECTION  1983, 1994    times 2    COLONOSCOPY  2016    COLONOSCOPY N/A 11/15/2018    COLONOSCOPY POLYPECTOMY ABLATION/SNARE/HEMOCLIPS X4 TO PROXIMAL ASCENDING POLYPECTOMY SITE performed by Seymour Gooden MD at [address] [place] [address], COLON, DIAGNOSTIC  2000    EYE SURGERY  2016    gavino cataract ext    ROTATOR CUFF REPAIR Right 10/2012       Family history:   Family History   Problem Relation Age of Onset    High Blood Pressure Mother     Cancer Mother         lung ca    Heart Disease Father     Heart Surgery Father         cabg x 2    Other Father         AAA    Diabetes Father     Heart Attack Brother     Breast Cancer Neg Hx        Social history :  reports that she quit smoking about 20 years ago. Her smoking use included cigarettes. She has a 15.00 pack-year smoking history. She has never used smokeless tobacco. She reports that she does not drink alcohol and does not use drugs.     Allergies   Allergen Reactions    Cozaar [Losartan Potassium] Other (See Comments)     Cough    Lisinopril Other (See Comments)     Cough    Pcn [Penicillins] Other (See Comments)     Unknown as a child think a rash       Current Outpatient Medications on File Prior to Visit   Medication Sig Dispense Refill    amiodarone (CORDARONE) 200 MG tablet Take 1 tablet by mouth daily Will start 2023 30 tablet 3    rivaroxaban (XARELTO) 20 MG TABS tablet Take 1 tablet by mouth daily (with breakfast) 90 tablet 1    potassium chloride (KLOR-CON M) 10 MEQ extended release tablet Take 1 tablet by mouth 2 times daily 180 tablet 3    metoprolol tartrate (LOPRESSOR) 25 MG tablet Take 1 tablet by mouth 2 times daily 60 tablet 1    pantoprazole (PROTONIX) 40 MG tablet Take 1 tablet by mouth daily 30 tablet 0    furosemide (LASIX) 40 MG tablet Take 1 tablet by mouth 2 times daily 60 tablet 3    fenofibrate (TRICOR) 145 MG tablet Take 1 tablet by mouth daily 90 tablet 3    FISH OIL by Does not apply route daily       Carboxymeth-Cellulose-CitricAc (PLENITY) CAPS Take 3 capsules by mouth 2 times daily (before meals) Take 3 capsules 30 minutes before lunch and dinner with 16 oz water each (Patient not taking: Reported on 2/28/2023) 180 capsule 0    amiodarone (CORDARONE) 200 MG tablet Take 1 tablet by mouth 2 times daily for 28 doses 28 tablet 0    Compression Stockings MISC by Does not apply route Moderate Compression level (Patient taking differently: by Does not apply route Moderate Compression level- wears occasionally) 2 each 0     No current facility-administered medications on file prior to visit. Review of Systems:   Review of Systems   Constitutional: Positive for fatigue. Negative for activity change, chills and fever. HENT: Negative for congestion, ear pain and tinnitus. Eyes: Negative for photophobia, pain and visual disturbance. Respiratory:  Negative for cough, chest tightness and wheezing. Cardiovascular:  negative for palpitations. Negative for chest pain and leg swelling. Shortness of breath with exertion  Gastrointestinal: Negative for abdominal pain, blood in stool, constipation, diarrhea, nausea and vomiting. Endocrine: Negative for cold intolerance and heat intolerance. Genitourinary: Negative for dysuria, flank pain and hematuria. Musculoskeletal: Negative for arthralgias, back pain, myalgias and neck stiffness. Skin: Negative for color change and rash. Allergic/Immunologic: Negative for food allergies. Neurological:postive for dizziness, negative light-headedness, numbness and headaches. Hematological: Does not bruise/bleed easily. Psychiatric/Behavioral: Negative for agitation, behavioral problems and confusion.        Physical Examination:    /82 (Site: Right Upper Arm, Position: Sitting, Cuff Size: Large Adult)   Pulse 62   Ht 5' 3.5\" (1.613 m)   Wt 290 lb 3.2 oz (131.6 kg)   LMP 09/19/2018   SpO2 97%   BMI 50.60 kg/m²    Wt Readings from Last 3 Encounters:   02/28/23 290 lb 3.2 oz (131.6 kg)   02/21/23 290 lb (131.5 kg)   02/10/23 293 lb (132.9 kg)     Body mass index is 50.6 kg/m². Physical Exam   Constitutional: She is oriented to person, place, and time and well-developed, well-nourished, and in no distress. HENT:   Head: Normocephalic and atraumatic. Eyes: Conjunctivae are normal. Pupils are equal, round, and reactive to light. Right and left eye exhibits no discharge. Neck: Normal range of motion. No JVD present. No thyromegaly present. Cardiovascular: Normal rate, regular rhythm and normal heart sounds. Exam reveals no friction rub. No murmur heard. Pulmonary/Chest: Effort normal and breath sounds normal. No stridor. No respiratory distress. She has no wheezes. Abdominal: Soft. Bowel sounds are normal. She exhibits no distension. There is no tenderness. Musculoskeletal: Normal range of motion. She exhibits no edema or tenderness. Neurological: She is alert and oriented to person, place, and time. She displays normal reflexes. No cranial nerve deficit. Coordination normal.   Skin: Skin is warm and dry. No rash noted. No erythema. Bilateral femoral groin sites soft, nontender, no hematoma.    Psychiatric: Mood and affect normal.     CBC:   Lab Results   Component Value Date/Time    WBC 6.1 02/21/2023 10:00 AM    HGB 16.5 02/21/2023 10:00 AM    HCT 49.9 02/21/2023 10:00 AM     02/21/2023 10:00 AM     Lipids:   Lab Results   Component Value Date    CHOL 155 03/29/2021    TRIG 102 03/29/2021    HDL 39 (L) 03/29/2021    LDLCALC 96 03/29/2021    LDLDIRECT 88 01/20/2020     PT/INR:   Lab Results   Component Value Date/Time    INR 1.85 02/21/2023 10:00 AM        BMP:    Lab Results   Component Value Date     02/21/2023    K 3.9 02/21/2023    CL 99 02/21/2023 CO2 26 02/21/2023    BUN 22 02/21/2023     CMP:   Lab Results   Component Value Date    AST 19 07/01/2017    PROT 7.0 07/01/2017    BILITOT 0.4 07/01/2017    ALKPHOS 61 07/01/2017     TSH:    Lab Results   Component Value Date/Time    TSH 3.54 10/26/2016 12:07 PM       EKGINTERPRETATION - EKG Interpretation:  sinus rhythm      Vitals:    02/28/23 1101   BP: 136/82   Site: Right Upper Arm   Position: Sitting   Cuff Size: Large Adult   Pulse: 62   SpO2: 97%   Weight: 290 lb 3.2 oz (131.6 kg)   Height: 5' 3.5\" (1.613 m)        IMPRESSION / RECOMMENDATIONS:     Status post cardioversion  1. PAF- s/p ablation of paroxysmal atrial fibrillation  2. Morbid obesity BMI 50  3. Sleep apnea  4. HTN  5. hld  6. Metabolic syndrome  7. Hypercoagulable due to PAF    Patient reports she feels well since cardioversion  EKG obtained patient noted to be in sinus rhythm  We will continue amiodarone 200 mg daily  Patient is following with bariatrics. She has lost 12 pounds. She is very happy about this. Discussed with patient about 3-month blanking period post procedure. Patient voiced understanding    Patient denies issues with bleeding.   We will continue Xarelto 20 mg daily    Vitals:    02/28/23 1101   BP: 136/82   Pulse: 62   SpO2: 97%     Pressure is stable patient to continue Lopressor 25 mg twice daily and Lasix 40 mg daily    Patient to follow-up in 1 month or sooner if needed    EVE Lynn - CNP

## 2023-03-08 ENCOUNTER — OFFICE VISIT (OUTPATIENT)
Dept: BARIATRICS/WEIGHT MGMT | Age: 58
End: 2023-03-08
Payer: COMMERCIAL

## 2023-03-08 VITALS
DIASTOLIC BLOOD PRESSURE: 82 MMHG | OXYGEN SATURATION: 97 % | WEIGHT: 284.9 LBS | HEIGHT: 64 IN | HEART RATE: 64 BPM | SYSTOLIC BLOOD PRESSURE: 134 MMHG | BODY MASS INDEX: 48.64 KG/M2

## 2023-03-08 DIAGNOSIS — E66.01 MORBID OBESITY WITH BMI OF 45.0-49.9, ADULT (HCC): Primary | ICD-10-CM

## 2023-03-08 DIAGNOSIS — Z79.899 MEDICATION MANAGEMENT: ICD-10-CM

## 2023-03-08 PROCEDURE — 99213 OFFICE O/P EST LOW 20 MIN: CPT | Performed by: NURSE PRACTITIONER

## 2023-03-08 PROCEDURE — 3075F SYST BP GE 130 - 139MM HG: CPT | Performed by: NURSE PRACTITIONER

## 2023-03-08 PROCEDURE — 3079F DIAST BP 80-89 MM HG: CPT | Performed by: NURSE PRACTITIONER

## 2023-03-08 ASSESSMENT — PATIENT HEALTH QUESTIONNAIRE - PHQ9
SUM OF ALL RESPONSES TO PHQ QUESTIONS 1-9: 0
1. LITTLE INTEREST OR PLEASURE IN DOING THINGS: 0
SUM OF ALL RESPONSES TO PHQ QUESTIONS 1-9: 0
SUM OF ALL RESPONSES TO PHQ9 QUESTIONS 1 & 2: 0
SUM OF ALL RESPONSES TO PHQ QUESTIONS 1-9: 0
2. FEELING DOWN, DEPRESSED OR HOPELESS: 0
SUM OF ALL RESPONSES TO PHQ QUESTIONS 1-9: 0

## 2023-03-08 NOTE — PROGRESS NOTES
Chief Complaint   Patient presents with    Weight Management     F/U- Medication wm visit. Discuss wm. No bar cov. SUBJECTIVE:    HPI: Patient is here with complaints of: Monthly Plentity visit. Obesity with a BMI of Body mass index is 49.68 kg/m². .    Current weight: 284 pounds    Weight change since last visit: -8.6  pounds (if pt failed to lose weight, cannot remain on medication). Plentity should be avoided with any of the following conditions: allergy to cellulose or gelatin; esophageal anatomic anomalies; strictures, Crohn's disease; gastroparesis; excessive acid reflux; ulcers; and any surgical procedure that has affected gastrointestinal transit or motility  DENIES    Pt complains of the following side effects:  excessive diarrhea, distended abdomen, or  limited bowel movements DENIES    Current dietary measures: tracking calories and decreasing carb intake    Current exercise measures: not currently; tries to stay active    I have reviewed the patient's(pertinent information to this visit) medical history, family history(scanned in  the [address] [place] [address] under \"patient questioner\"), social history and review of systems with the patient today in the office. Past Surgical History:   Procedure Laterality Date    CARDIOVERSION  2023     SECTION  ,     times 2    COLONOSCOPY  2016    COLONOSCOPY N/A 11/15/2018    COLONOSCOPY POLYPECTOMY ABLATION/SNARE/HEMOCLIPS X4 TO PROXIMAL ASCENDING POLYPECTOMY SITE performed by Rohith Rojo MD at [address] [place] [address], [address] [place] [address]    EYE SURGERY  2016    gavino cataract ext    ROTATOR CUFF REPAIR Right 10/2012       Past Medical History:   Diagnosis Date    Atrial fibrillation with RVR ([address] 2017    Bell palsy     right side of face affected    H/O 24 hour EKG monitoring 2010    holter: other then an episode of sinus tach at 150 bpm no other clinically significant arrhythmia seen.   Symptoms reported in pts diary do not correlate with this episode    H/O cardiovascular stress test 01/14/2010    Joe protocol: normal stress study. gated study shows uniform wall motion with calculated LV EF of over 67 %    H/O echocardiogram 05/20/2010    ECHO:  There is no comparison study available. There is mild concentric LV hypertrophy. LVSF is normal, EF = > 55 %    H/O echocardiogram 07/18/2014    EF55%, borderline left atrial enlargement, normal LV systolic function, mild mitral regurg, no pericardial effusion. H/O echocardiogram 07/03/2017    EF55%  AV mild  sclerotic    H/O echocardiogram 11/07/2018    EF55-60% sclerotic non stenosed AV    H/O transesophageal echocardiography (ANA) for monitoring 12/14/2022    Calcification noted on the non-coronary cusp of the aortic valve. MILD MR. Heart palpitations     History of cardiac monitoring 07/21/2014    Event - no clinically significant arrythmias    History of cardiac monitoring 08/10/2017    No clinically signifacant arrythmias    History of complete ECG 05/11/2010    HX OTHER MEDICAL     \"not sure if have sleep apnea- have cpap but the last sleep study not conclusive so having another sleep study done at [facility] a cpap but never offically dx with sleep apnea\"    Hyperlipidemia 04/2010    Hypertension     follows with Dr Flaquita Curtis    Obesity     S/P ablation of atrial fibrillation 02/01/2023    S/P atrial fibrillation ablation performed by Dr. Ericka Franklin.        Family History   Problem Relation Age of Onset    High Blood Pressure Mother     Cancer Mother         lung ca    Heart Disease Father     Heart Surgery Father         cabg x 2    Other Father         AAA    Diabetes Father     Heart Attack Brother     Breast Cancer Neg Hx        Social History     Socioeconomic History    Marital status:      Spouse name: Not on file    Number of children: 2    Years of education: Not on file    Highest education level: Not on file   Occupational History    Occupation: full time   Tobacco Use    Smoking status: Former     Packs/day: 0.50     Years: 30.00     Pack years: 15.00     Types: Cigarettes     Quit date: 2002     Years since quittin.7    Smokeless tobacco: Never   Vaping Use    Vaping Use: Never used   Substance and Sexual Activity    Alcohol use: No     Comment: NO CAFFEINE    Drug use: No    Sexual activity: Yes     Partners: Male   Other Topics Concern    Not on file   Social History Narrative    Not on file     Social Determinants of Health     Financial Resource Strain: Not on file   Food Insecurity: Not on file   Transportation Needs: Not on file   Physical Activity: Not on file   Stress: Not on file   Social Connections: Not on file   Intimate Partner Violence: Not on file   Housing Stability: Not on file       Current Outpatient Medications   Medication Sig Dispense Refill    amiodarone (CORDARONE) 200 MG tablet Take 1 tablet by mouth daily Will start 2023 30 tablet 3    rivaroxaban (XARELTO) 20 MG TABS tablet Take 1 tablet by mouth daily (with breakfast) 90 tablet 1    potassium chloride (KLOR-CON M) 10 MEQ extended release tablet Take 1 tablet by mouth 2 times daily 180 tablet 3    metoprolol tartrate (LOPRESSOR) 25 MG tablet Take 1 tablet by mouth 2 times daily 60 tablet 1    furosemide (LASIX) 40 MG tablet Take 1 tablet by mouth 2 times daily 60 tablet 3    fenofibrate (TRICOR) 145 MG tablet Take 1 tablet by mouth daily 90 tablet 3    FISH OIL by Does not apply route daily       Carboxymeth-Cellulose-CitricAc (PLENITY) CAPS Take 3 capsules by mouth 2 times daily (before meals) Take 3 capsules 30 minutes before lunch and dinner with 16 oz water each (Patient not taking: No sig reported) 180 capsule 0    pantoprazole (PROTONIX) 40 MG tablet Take 1 tablet by mouth daily (Patient not taking: Reported on 3/8/2023) 30 tablet 0    amiodarone (CORDARONE) 200 MG tablet Take 1 tablet by mouth 2 times daily for 28 doses 28 tablet 0    Compression Stockings MISC by Does not apply route Moderate Compression level (Patient not taking: Reported on 3/8/2023) 2 each 0     No current facility-administered medications for this visit. Allergies   Allergen Reactions    Cozaar [Losartan Potassium] Other (See Comments)     Cough    Lisinopril Other (See Comments)     Cough    Pcn [Penicillins] Other (See Comments)     Unknown as a child think a rash       Review of Systems:         Review of Systems   All other systems reviewed and are negative. OBJECTIVE:  Physical Exam:    /82 (Site: Left Upper Arm, Position: Sitting, Cuff Size: Large Adult)   Pulse 64   Ht 5' 3.5\" (1.613 m)   Wt 284 lb 14.4 oz (129.2 kg)   LMP 09/19/2018   SpO2 97%   BMI 49.68 kg/m²      Physical Exam  Constitutional:       Appearance: Normal appearance. She is obese. HENT:      Head: Normocephalic. Nose: Nose normal.      Mouth/Throat:      Pharynx: Oropharynx is clear. Eyes:      Conjunctiva/sclera: Conjunctivae normal.      Pupils: Pupils are equal, round, and reactive to light. Cardiovascular:      Rate and Rhythm: Normal rate. Pulses: Normal pulses. Pulmonary:      Effort: Pulmonary effort is normal.   Musculoskeletal:         General: Normal range of motion. Cervical back: Normal range of motion. Skin:     General: Skin is warm and dry. Capillary Refill: Capillary refill takes less than 2 seconds. Neurological:      General: No focal deficit present. Mental Status: She is alert and oriented to person, place, and time. Psychiatric:         Mood and Affect: Mood normal.         Behavior: Behavior normal.         ASSESSMENT:  1. Morbid obesity with BMI of 45.0-49.9, adult ([facility])  - Continue tracking calories; about  8081-6917 daily  - Continue 64 oz water daily  - Increase activity as able    2.  Medication management  - Did not start Plentity- [facility] never contacted patient  - Patient has lost weight per self with diet changes  - Down 8.6 pounds since last seen  - HR now controlled  - Not interested in other weight loss medications at this time due to financial concerns  - Would benefit from maintenance dose Qsymia in future  - Will send pt to RD for RD program  - RTC as needed for weight loss medication assistance       Orders Placed This Encounter   Procedures    Amb Referral to Nutrition Services          No orders of the defined types were placed in this encounter. Follow Up:  Return for As needed for weight management.       Chey Dobbs, APRN - CNP

## 2023-03-15 ENCOUNTER — OFFICE VISIT (OUTPATIENT)
Dept: BARIATRICS/WEIGHT MGMT | Age: 58
End: 2023-03-15

## 2023-03-15 VITALS — BODY MASS INDEX: 48.04 KG/M2 | WEIGHT: 281.4 LBS | HEIGHT: 64 IN

## 2023-03-15 DIAGNOSIS — E66.01 MORBID OBESITY WITH BMI OF 45.0-49.9, ADULT (HCC): Primary | ICD-10-CM

## 2023-03-15 PROCEDURE — 99999 PR OFFICE/OUTPT VISIT,PROCEDURE ONLY: CPT | Performed by: SURGERY

## 2023-03-15 NOTE — PROGRESS NOTES
Outpatient Nutrition Counseling - Non-Surgical Weight Loss Program    REASON FOR VISIT: Initial Non-Surgical Weight Loss Program    Chief Complaint:    Chief Complaint   Patient presents with    Weight Management       SUBJECTIVE:  Pt here for initial nutrition consult in non-surgical weight loss program.  The patient is a 59 y.o. female being seen for obesity, enrolled in Non-Surgical Weight Loss Program; Janay's, Height: 5' 3.5\" (161.3 cm), Weight: 281 lb 6.4 oz (127.6 kg), Current Body mass index is 49.07 kg/m². patient's PCP is No primary care provider on file. Comorbid Conditions:  Significant diseases affecting this patient are   Past Medical History:   Diagnosis Date    Atrial fibrillation with RVR ([address] 07/01/2017    Bell palsy     right side of face affected    H/O 24 hour EKG monitoring 03/30/2010    holter: other then an episode of sinus tach at 150 bpm no other clinically significant arrhythmia seen. Symptoms reported in pts diary do not correlate with this episode    H/O cardiovascular stress test 01/14/2010    Joe protocol: normal stress study. gated study shows uniform wall motion with calculated LV EF of over 67 %    H/O echocardiogram 05/20/2010    ECHO:  There is no comparison study available. There is mild concentric LV hypertrophy. LVSF is normal, EF = > 55 %    H/O echocardiogram 07/18/2014    EF55%, borderline left atrial enlargement, normal LV systolic function, mild mitral regurg, no pericardial effusion. H/O echocardiogram 07/03/2017    EF55%  AV mild  sclerotic    H/O echocardiogram 11/07/2018    EF55-60% sclerotic non stenosed AV    H/O transesophageal echocardiography (ANA) for monitoring 12/14/2022    Calcification noted on the non-coronary cusp of the aortic valve. MILD MR.     Heart palpitations     History of cardiac monitoring 07/21/2014    Event - no clinically significant arrythmias    History of cardiac monitoring 08/10/2017    No clinically signifacant arrythmias History of complete ECG 2010    HX OTHER MEDICAL     \"not sure if have sleep apnea- have cpap but the last sleep study not conclusive so having another sleep study done at [facility] a cpap but never offically dx with sleep apnea\"    Hyperlipidemia 2010    Hypertension     follows with Dr Ellington Later    Obesity     S/P ablation of atrial fibrillation 2023    S/P atrial fibrillation ablation performed by Dr. Tiana Barclay. .    Review of Systems - Review of Systems  Otherwise per HPI. Allergies:   Allergies   Allergen Reactions    Cozaar [Losartan Potassium] Other (See Comments)     Cough    Lisinopril Other (See Comments)     Cough    Pcn [Penicillins] Other (See Comments)     Unknown as a child think a rash       Past Surgical History:  Past Surgical History:   Procedure Laterality Date    CARDIOVERSION  2023     SECTION  ,     times 2    COLONOSCOPY  2016    COLONOSCOPY N/A 11/15/2018    COLONOSCOPY POLYPECTOMY ABLATION/SNARE/HEMOCLIPS X4 TO PROXIMAL ASCENDING POLYPECTOMY SITE performed by Isidoro Lau MD at [address] [place] [address], COLON, DIAGNOSTIC      EYE SURGERY  2016    gavino cataract ext    ROTATOR CUFF REPAIR Right 10/2012       Family History:  Family History   Problem Relation Age of Onset    High Blood Pressure Mother     Cancer Mother         lung ca    Heart Disease Father     Heart Surgery Father         cabg x 2    Other Father         AAA    Diabetes Father     Heart Attack Brother     Breast Cancer Neg Hx        Social History:  Social History     Socioeconomic History    Marital status:      Spouse name: Not on file    Number of children: 2    Years of education: Not on file    Highest education level: Not on file   Occupational History    Occupation: full time   Tobacco Use    Smoking status: Former     Packs/day: 0.50     Years: 30.00     Pack years: 15.00     Types: Cigarettes     Quit date: 2002     Years since quittin.7    Smokeless tobacco: Never   Vaping Use    Vaping Use: Never used   Substance and Sexual Activity    Alcohol use: No     Comment: NO CAFFEINE    Drug use: No    Sexual activity: Yes     Partners: Male   Other Topics Concern    Not on file   Social History Narrative    Not on file     Social Determinants of Health     Financial Resource Strain: Not on file   Food Insecurity: Not on file   Transportation Needs: Not on file   Physical Activity: Not on file   Stress: Not on file   Social Connections: Not on file   Intimate Partner Violence: Not on file   Housing Stability: Not on file         OBJECTIVE:  Physical Exam   Ht 5' 3.5\" (1.613 m)   Wt 281 lb 6.4 oz (127.6 kg)   LMP 09/19/2018   BMI 49.07 kg/m²        NUTRITION DIAGNOSIS: Overweight / Obesity   Problem: Increased adiposity compared to reference standard or established norm   Etiology: Excess intake compared to output over time   S/S: Ht: 5' 3.5\" Wt: 281 lb 6.4 oz BMI: 49.07    NUTRITION INTERVENTIONS:    Individualized treatment goals to address nutrition diagnosis:   Instructed on 1200 calorie diet for weight loss   Provided sample menus, healthy foods list, and plate model   Encouraged record keeping and physical activity as approved by physician    MONITORING/ EVALUATION/ PLAN:   Pt verbalized understanding of all materials covered   Pt asked pertinent questions throughout the session - expect compliance with nutrition guidelines presented   Provided pt with contact information should questions arise prior to next visit   Will f/u with pt weekly    Declan Mcallister, RD, LD

## 2023-03-20 ENCOUNTER — TELEPHONE (OUTPATIENT)
Dept: CARDIOLOGY CLINIC | Age: 58
End: 2023-03-20

## 2023-03-21 ENCOUNTER — OFFICE VISIT (OUTPATIENT)
Dept: CARDIOLOGY CLINIC | Age: 58
End: 2023-03-21
Payer: COMMERCIAL

## 2023-03-21 VITALS
OXYGEN SATURATION: 96 % | DIASTOLIC BLOOD PRESSURE: 82 MMHG | SYSTOLIC BLOOD PRESSURE: 136 MMHG | BODY MASS INDEX: 48.86 KG/M2 | WEIGHT: 286.2 LBS | HEART RATE: 60 BPM | HEIGHT: 64 IN

## 2023-03-21 DIAGNOSIS — D68.69 HYPERCOAGULABILITY DUE TO ATRIAL FIBRILLATION (HCC): ICD-10-CM

## 2023-03-21 DIAGNOSIS — I48.91 HYPERCOAGULABILITY DUE TO ATRIAL FIBRILLATION (HCC): ICD-10-CM

## 2023-03-21 DIAGNOSIS — G47.33 OSA TREATED WITH BIPAP: ICD-10-CM

## 2023-03-21 DIAGNOSIS — Z98.890 S/P ABLATION OF ATRIAL FIBRILLATION: ICD-10-CM

## 2023-03-21 DIAGNOSIS — I48.0 PAROXYSMAL ATRIAL FIBRILLATION (HCC): Primary | ICD-10-CM

## 2023-03-21 DIAGNOSIS — Z86.79 S/P ABLATION OF ATRIAL FIBRILLATION: ICD-10-CM

## 2023-03-21 DIAGNOSIS — I10 ESSENTIAL HYPERTENSION: ICD-10-CM

## 2023-03-21 PROCEDURE — 3079F DIAST BP 80-89 MM HG: CPT | Performed by: NURSE PRACTITIONER

## 2023-03-21 PROCEDURE — 99214 OFFICE O/P EST MOD 30 MIN: CPT | Performed by: NURSE PRACTITIONER

## 2023-03-21 PROCEDURE — 3075F SYST BP GE 130 - 139MM HG: CPT | Performed by: NURSE PRACTITIONER

## 2023-03-21 PROCEDURE — 93000 ELECTROCARDIOGRAM COMPLETE: CPT | Performed by: NURSE PRACTITIONER

## 2023-03-21 NOTE — PATIENT INSTRUCTIONS
Please be informed that if you contact our office outside of normal business hours the physician on call cannot help with any scheduling or rescheduling issues, procedure instruction questions or any type of medication issue. We advise you for any urgent/emergency that you go to the nearest emergency room! PLEASE CALL OUR OFFICE DURING NORMAL BUSINESS HOURS    Monday - Friday   8 am to 5 pm    [place] [address]: [phone]    [place]:  [phone]        **It is YOUR responsibilty to bring medication bottles and/or updated medication list to [address] [place] [address]. This will allow us to better serve you and all your healthcare needs**        Thank you for allowing us to care for you today! We want to ensure we can follow your treatment plan and we strive to give you the best outcomes and experience possible. If you ever have a life threatening emergency and call 911 - for an ambulance (EMS)   Our providers can only care for you at:   [facility] or [facility]. Even if you have someone take you or you drive yourself we can only care for you in a [facility] facility. Our providers are not setup at the other healthcare locations!

## 2023-03-21 NOTE — PROGRESS NOTES
Right and left eye exhibits no discharge. Neck: Normal range of motion. No JVD present. No thyromegaly present. Cardiovascular: irregular rhythm   Pulmonary/Chest: Effort normal and breath sounds normal. No stridor. No respiratory distress. She has no wheezes. Abdominal: Soft. Bowel sounds are normal. She exhibits no distension. There is no tenderness. Musculoskeletal: Normal range of motion. She exhibits no edema or tenderness. Neurological: She is alert and oriented to person, place, and time. She displays normal reflexes. No cranial nerve deficit. Coordination normal.   Skin: Skin is warm and dry. No rash noted. No erythema. Bilateral femoral groin sites soft, nontender, no hematoma. Psychiatric: Mood and affect normal.             CBC:   Lab Results   Component Value Date/Time    WBC 6.1 02/21/2023 10:00 AM    HGB 16.5 02/21/2023 10:00 AM    HCT 49.9 02/21/2023 10:00 AM     02/21/2023 10:00 AM     Lipids:   Lab Results   Component Value Date    CHOL 155 03/29/2021    TRIG 102 03/29/2021    HDL 39 (L) 03/29/2021    LDLCALC 96 03/29/2021    LDLDIRECT 88 01/20/2020     PT/INR:   Lab Results   Component Value Date/Time    INR 1.85 02/21/2023 10:00 AM        BMP:    Lab Results   Component Value Date     02/21/2023    K 3.9 02/21/2023    CL 99 02/21/2023    CO2 26 02/21/2023    BUN 22 02/21/2023     CMP:   Lab Results   Component Value Date    AST 19 07/01/2017    PROT 7.0 07/01/2017    BILITOT 0.4 07/01/2017    ALKPHOS 61 07/01/2017     TSH:    Lab Results   Component Value Date/Time    TSH 3.54 10/26/2016 12:07 PM       EKG Interpretation:  sinus rhythm    IMPRESSION / RECOMMENDATIONS:     1. PAF- s/p ablation of paroxysmal atrial fibrillation  2. Morbid obesity BMI 50  3. Sleep apnea  4. HTN  5. hld  6. Metabolic syndrome  7.   Hypercoagulable due to PAF    EKG obtained and reviewed patient noted to be in sinus rhythm  Patient does continue to have intermittent episodes of

## 2023-03-24 ENCOUNTER — OFFICE VISIT (OUTPATIENT)
Dept: BARIATRICS/WEIGHT MGMT | Age: 58
End: 2023-03-24

## 2023-03-24 VITALS — WEIGHT: 279.6 LBS | BODY MASS INDEX: 47.73 KG/M2 | HEIGHT: 64 IN

## 2023-03-24 DIAGNOSIS — E66.01 MORBID OBESITY WITH BMI OF 45.0-49.9, ADULT (HCC): Primary | ICD-10-CM

## 2023-03-24 NOTE — PROGRESS NOTES
OutpatientNutrition Counseling - Non-Surgical Weight Loss Program    REASON FOR VISIT:    Chief Complaint:    Chief Complaint   Patient presents with    Weight Management     Weigh in         OBJECTIVE:  Physical Exam   Ht 5' 3.5\" (1.613 m)   Wt 279 lb 9.6 oz (126.8 kg)   LMP 09/19/2018   BMI 48.75 kg/m²      Patient lost 1.8 pounds.

## 2023-03-31 ENCOUNTER — OFFICE VISIT (OUTPATIENT)
Dept: BARIATRICS/WEIGHT MGMT | Age: 58
End: 2023-03-31

## 2023-03-31 VITALS — WEIGHT: 277.8 LBS | BODY MASS INDEX: 47.43 KG/M2 | HEIGHT: 64 IN

## 2023-03-31 DIAGNOSIS — E66.01 MORBID OBESITY WITH BMI OF 45.0-49.9, ADULT (HCC): Primary | ICD-10-CM

## 2023-03-31 NOTE — PROGRESS NOTES
OutpatientNutrition Counseling - Non-Surgical Weight Loss Program    REASON FOR VISIT:    Chief Complaint:    Chief Complaint   Patient presents with    Weight Management     Weigh In         OBJECTIVE:  Physical Exam   Ht 5' 3.5\" (1.613 m)   Wt 277 lb 12.8 oz (126 kg)   LMP 09/19/2018   BMI 48.44 kg/m²                Patient lost 1.8lbs

## 2023-04-07 ENCOUNTER — OFFICE VISIT (OUTPATIENT)
Dept: BARIATRICS/WEIGHT MGMT | Age: 58
End: 2023-04-07

## 2023-04-07 VITALS — BODY MASS INDEX: 47.6 KG/M2 | HEIGHT: 64 IN | WEIGHT: 278.8 LBS

## 2023-04-07 DIAGNOSIS — E66.01 MORBID OBESITY WITH BMI OF 45.0-49.9, ADULT (HCC): Primary | ICD-10-CM

## 2023-04-07 NOTE — PROGRESS NOTES
OutpatientNutrition Counseling - Non-Surgical Weight Loss Program    REASON FOR VISIT:    Chief Complaint:    Chief Complaint   Patient presents with    Weight Management     Weigh in         OBJECTIVE:  Physical Exam   Ht 5' 3.5\" (1.613 m)   Wt 278 lb 12.8 oz (126.5 kg)   LMP 09/19/2018   BMI 48.61 kg/m²                Patient gained 1lbs

## 2023-04-20 ENCOUNTER — HOSPITAL ENCOUNTER (OUTPATIENT)
Dept: WOMENS IMAGING | Age: 58
Discharge: HOME | End: 2023-04-20
Payer: COMMERCIAL

## 2023-04-20 DIAGNOSIS — Z12.31 ENCOUNTER FOR SCREENING MAMMOGRAM FOR BREAST CANCER: ICD-10-CM

## 2023-04-20 PROCEDURE — 77063 BREAST TOMOSYNTHESIS BI: CPT

## 2023-04-21 ENCOUNTER — OFFICE VISIT (OUTPATIENT)
Dept: BARIATRICS/WEIGHT MGMT | Age: 58
End: 2023-04-21

## 2023-04-21 VITALS — BODY MASS INDEX: 46.74 KG/M2 | HEIGHT: 64 IN | WEIGHT: 273.8 LBS

## 2023-04-21 DIAGNOSIS — E66.01 MORBID OBESITY WITH BMI OF 45.0-49.9, ADULT (HCC): Primary | ICD-10-CM

## 2023-04-21 NOTE — PROGRESS NOTES
OutpatientNutrition Counseling - Non-Surgical Weight Loss Program    REASON FOR VISIT:    Chief Complaint:    Chief Complaint   Patient presents with    Weight Management     Weigh in         OBJECTIVE:  Physical Exam   Ht 5' 3.5\" (1.613 m)   Wt 273 lb 12.8 oz (124.2 kg)   LMP 09/19/2018   BMI 47.74 kg/m²                Patient 1.8lbs

## 2023-04-28 ENCOUNTER — OFFICE VISIT (OUTPATIENT)
Dept: BARIATRICS/WEIGHT MGMT | Age: 58
End: 2023-04-28

## 2023-04-28 VITALS — BODY MASS INDEX: 46.71 KG/M2 | WEIGHT: 273.6 LBS | HEIGHT: 64 IN

## 2023-04-28 DIAGNOSIS — E66.01 MORBID OBESITY WITH BMI OF 45.0-49.9, ADULT (HCC): Primary | ICD-10-CM

## 2023-04-28 PROCEDURE — 99999 PR OFFICE/OUTPT VISIT,PROCEDURE ONLY: CPT | Performed by: SURGERY

## 2023-05-04 ENCOUNTER — OFFICE VISIT (OUTPATIENT)
Dept: CARDIOLOGY CLINIC | Age: 58
End: 2023-05-04
Payer: COMMERCIAL

## 2023-05-04 VITALS
SYSTOLIC BLOOD PRESSURE: 132 MMHG | WEIGHT: 278.4 LBS | HEIGHT: 64 IN | OXYGEN SATURATION: 96 % | DIASTOLIC BLOOD PRESSURE: 82 MMHG | HEART RATE: 56 BPM | BODY MASS INDEX: 47.53 KG/M2

## 2023-05-04 DIAGNOSIS — I38 VHD (VALVULAR HEART DISEASE): ICD-10-CM

## 2023-05-04 DIAGNOSIS — I10 ESSENTIAL HYPERTENSION: ICD-10-CM

## 2023-05-04 DIAGNOSIS — I48.0 PAROXYSMAL ATRIAL FIBRILLATION (HCC): Primary | ICD-10-CM

## 2023-05-04 DIAGNOSIS — E66.01 OBESITY, MORBID, BMI 40.0-49.9 (HCC): ICD-10-CM

## 2023-05-04 DIAGNOSIS — E78.2 MIXED HYPERLIPIDEMIA: ICD-10-CM

## 2023-05-04 PROCEDURE — 3075F SYST BP GE 130 - 139MM HG: CPT | Performed by: NURSE PRACTITIONER

## 2023-05-04 PROCEDURE — 3079F DIAST BP 80-89 MM HG: CPT | Performed by: NURSE PRACTITIONER

## 2023-05-04 PROCEDURE — 99214 OFFICE O/P EST MOD 30 MIN: CPT | Performed by: NURSE PRACTITIONER

## 2023-05-04 RX ORDER — FUROSEMIDE 40 MG/1
40 TABLET ORAL 2 TIMES DAILY
Qty: 180 TABLET | Refills: 1 | Status: SHIPPED | OUTPATIENT
Start: 2023-05-04

## 2023-05-04 ASSESSMENT — ENCOUNTER SYMPTOMS
ORTHOPNEA: 0
SHORTNESS OF BREATH: 0

## 2023-05-04 NOTE — PROGRESS NOTES
5/4/2023  Primary cardiologist: Dr. Mitzy Martinez:   Halina Schulz  is an established 59 y.o.  female here for a 6 month follow up on PAF      SUBJECTIVE/OBJECTIVE:  Halina Schulz is a 59 y.o. female with a history of persistent atrial fibrillation, s/p ablation of AF, VHD hypertension, hyperlipidemia, obesity, metabolic syndrome, obesity , sleep apnea and chronic anticoagulation use. HPI :   Halina Schulz reports feeling well. She notes her palpitations have lessened significantly. She notes occasional skipped beat every now and again. She is on chronic anticoagulation and denies falls or bleeding. She continues to avoid caffeine. She is following with weight loss management discussed with her ongoing weight loss. She started an exercise program.     Review of Systems   Constitutional: Negative for diaphoresis and malaise/fatigue. Cardiovascular:  Negative for chest pain, claudication, dyspnea on exertion, irregular heartbeat, leg swelling, near-syncope, orthopnea, palpitations and paroxysmal nocturnal dyspnea. Respiratory:  Negative for shortness of breath. Neurological:  Negative for dizziness and light-headedness. Vitals:    05/04/23 0934   BP: 132/82   Site: Left Upper Arm   Position: Sitting   Cuff Size: Large Adult   Pulse: 56   SpO2: 96%   Weight: 278 lb 6.4 oz (126.3 kg)   Height: 5' 3.5\" (1.613 m)     Patient-Reported Vitals 3/2/2021   Patient-Reported Weight 292#   Patient-Reported Height 5'5\"   Patient-Reported Systolic 827   Patient-Reported Diastolic 78     Wt Readings from Last 3 Encounters:   05/04/23 278 lb 6.4 oz (126.3 kg)   04/28/23 273 lb 9.6 oz (124.1 kg)   04/21/23 273 lb 12.8 oz (124.2 kg)     Body mass index is 48.54 kg/m². Physical Exam  Constitutional:       Appearance: Normal appearance. She is obese. HENT:      Head: Normocephalic and atraumatic. Eyes:      Extraocular Movements: Extraocular movements intact. Pupils: Pupils are equal, round, and reactive to light.    Neck:

## 2023-05-04 NOTE — PATIENT INSTRUCTIONS
Please be informed that if you contact our office outside of normal business hours the physician on call cannot help with any scheduling or rescheduling issues, procedure instruction questions or any type of medication issue. We advise you for any urgent/emergency that you go to the nearest emergency room! PLEASE CALL OUR OFFICE DURING NORMAL BUSINESS HOURS    Monday - Friday   8 am to 5 pm    [address]: [phone]    [place]:  [phone]        **It is YOUR responsibilty to bring medication bottles and/or updated medication list to [address] [place] [address]. This will allow us to better serve you and all your healthcare needs**      Thank you for allowing us to care for you today! We want to ensure we can follow your treatment plan and we strive to give you the best outcomes and experience possible. If you ever have a life threatening emergency and call 911 - for an ambulance (EMS)   Our providers can only care for you at:   [facility] or [facility]. Even if you have someone take you or you drive yourself we can only care for you in a [facility] facility. Our providers are not setup at the other healthcare locations!

## 2023-05-09 ENCOUNTER — OFFICE VISIT (OUTPATIENT)
Dept: FAMILY MEDICINE CLINIC | Age: 58
End: 2023-05-09
Payer: COMMERCIAL

## 2023-05-09 ENCOUNTER — HOSPITAL ENCOUNTER (OUTPATIENT)
Age: 58
Discharge: HOME | End: 2023-05-09
Payer: COMMERCIAL

## 2023-05-09 ENCOUNTER — TELEPHONE (OUTPATIENT)
Dept: CARDIOLOGY CLINIC | Age: 58
End: 2023-05-09

## 2023-05-09 VITALS
DIASTOLIC BLOOD PRESSURE: 82 MMHG | SYSTOLIC BLOOD PRESSURE: 110 MMHG | TEMPERATURE: 97.7 F | BODY MASS INDEX: 48.75 KG/M2 | WEIGHT: 279.6 LBS | HEART RATE: 54 BPM | OXYGEN SATURATION: 95 %

## 2023-05-09 DIAGNOSIS — R92.8 ABNORMAL MAMMOGRAM OF RIGHT BREAST: Primary | ICD-10-CM

## 2023-05-09 DIAGNOSIS — E78.2 MIXED HYPERLIPIDEMIA: ICD-10-CM

## 2023-05-09 LAB
CHOLEST SERPL-MCNC: 176 MG/DL
HDLC SERPL-MCNC: 44 MG/DL
LDLC SERPL CALC-MCNC: 105 MG/DL
TRIGL SERPL-MCNC: 135 MG/DL

## 2023-05-09 PROCEDURE — 99203 OFFICE O/P NEW LOW 30 MIN: CPT | Performed by: NURSE PRACTITIONER

## 2023-05-09 PROCEDURE — 80061 LIPID PANEL: CPT

## 2023-05-09 PROCEDURE — 3074F SYST BP LT 130 MM HG: CPT | Performed by: NURSE PRACTITIONER

## 2023-05-09 PROCEDURE — 36415 COLL VENOUS BLD VENIPUNCTURE: CPT

## 2023-05-09 PROCEDURE — 3079F DIAST BP 80-89 MM HG: CPT | Performed by: NURSE PRACTITIONER

## 2023-05-09 ASSESSMENT — ENCOUNTER SYMPTOMS
SINUS PRESSURE: 0
WHEEZING: 0
RHINORRHEA: 0
CHEST TIGHTNESS: 0
NAUSEA: 0
DIARRHEA: 0
VOMITING: 0
SINUS PAIN: 0
SHORTNESS OF BREATH: 0
COUGH: 0
SORE THROAT: 0

## 2023-05-09 NOTE — TELEPHONE ENCOUNTER
Cardiologist: Dr. Reyes Leisure  Surgeon: Dr. Ashley Kurtz, DAVIAN  Surgery: Teeth Extraction  Anesthesia: Local/ Lidocaine  Date: 5/10/2023  FAX# [phone]  # [phone]    Last OV 5/4/2023 w/Nancy    Paroxysmal atrial fibrillation [facility])  She is status post cardioversion and atrial fibrillation. Today she is in regular rate with regular rhythm. We will continue the amiodarone with ongoing monitoring of her labs. Continue xarelto - denies bleeding or falls      VHD (valvular heart disease)  Midl to moderate MR  Asymptomatic   Continue ongoing surveillance       Essential hypertension  Controlled   On metoprolol- no changes     Obesity, morbid, BMI 40.0-49.9 ([address]  Bmi 48.54  Working on weight loss - going to bariatrics weight loss clinic      Mixed hyperlipidemia  Labs. Given slip for lipids. Currently on Tricor and fish oil. No changes in medications be made today. Metabolic syndrome  Need repeat lipids. Continue with weight loss. Last EKG- 3/21/2023        Echo- 3/30/2022   Limited study due to patients body habitus. Left ventricular function and size is normal, EF is estimated at 55-60%. Moderate left ventricular hypertrophy. Grade II diastolic dysfunction. No regional wall motion abnormalities were detected. Bi atrial enlargement noted. Sclerotic, but non-stenotic aortic valve. Mitral annular calcification is present. Mild to Moderate mitral and moderate tricuspid regurgitation is present. Mild pulmonary hypertension is noted with RVSp of 46mmHg. No evidence of pericardial effusion.         Xarelto

## 2023-05-09 NOTE — PROGRESS NOTES
02/21/2023    LABALBU 4.3 02/21/2023    BILITOT 0.5 02/21/2023    ALKPHOS 42 02/21/2023    AST 31 02/21/2023    ALT 21 02/21/2023    LABGLOM 58 (L) 02/21/2023    GFRAA >60 03/09/2022    AGRATIO 2.3 (H) 04/10/2018    GLOB 1.9 04/10/2018     Lab Results   Component Value Date    CHOL 155 03/29/2021    CHOL 155 03/29/2021    CHOL 173 08/04/2017     Lab Results   Component Value Date    TRIG 102 03/29/2021    TRIG 102 03/29/2021    TRIG 138 08/04/2017     Lab Results   Component Value Date    HDL 39 (L) 03/29/2021    HDL 39 03/29/2021    HDL 42 01/20/2020     Lab Results   Component Value Date    LDLCALC 96 03/29/2021    LDLCALC 96 03/29/2021    LDLCALC 110 (H) 10/26/2016    LDLCHOLESTEROL 104 (H) 08/04/2017    LDLCHOLESTEROL 99 07/18/2014     Lab Results   Component Value Date    LABA1C 5.3 06/22/2015     Lab Results   Component Value Date    TSH 3.54 10/26/2016    TSHHS 4.210 (H) 07/01/2017         ASSESSMENT/PLAN:      1. Abnormal mammogram of right breast  - [facility] US BREAST LIMITED RIGHT; Future  - [facility] RADHA DIGITAL DIAGNOSTIC UNILATERAL RIGHT; Future        No orders of the defined types were placed in this encounter. Medications Discontinued During This Encounter   Medication Reason    Carboxymeth-Cellulose-CitricAc (PLENITY) CAPS LIST CLEANUP    Compression Stockings [id number] discussed with patient. Questions answered. Patient verbalizes understanding and agrees with plan. After visit summary provided. Advised to call for any problems, questions, or concerns. Return if symptoms worsen or fail to improve.                                              Signed:  EVE Castellon CNP  05/09/23  9:14 AM

## 2023-05-11 ENCOUNTER — TELEPHONE (OUTPATIENT)
Dept: CARDIOLOGY CLINIC | Age: 58
End: 2023-05-11

## 2023-05-11 NOTE — TELEPHONE ENCOUNTER
Lipid Panel  LDL is up - please monitor diet and weight loss- goal is LDL < 100 - no change in medications   Spoke to pt Patient advised and voices understanding.

## 2023-05-12 ENCOUNTER — TELEPHONE (OUTPATIENT)
Dept: CARDIOLOGY CLINIC | Age: 58
End: 2023-05-12

## 2023-05-12 ENCOUNTER — OFFICE VISIT (OUTPATIENT)
Dept: BARIATRICS/WEIGHT MGMT | Age: 58
End: 2023-05-12

## 2023-05-12 VITALS — BODY MASS INDEX: 47.15 KG/M2 | WEIGHT: 276.2 LBS | HEIGHT: 64 IN

## 2023-05-12 DIAGNOSIS — E66.01 MORBID OBESITY WITH BMI OF 45.0-49.9, ADULT (HCC): Primary | ICD-10-CM

## 2023-05-12 PROCEDURE — 99999 PR OFFICE/OUTPT VISIT,PROCEDURE ONLY: CPT

## 2023-05-12 NOTE — PROGRESS NOTES
OutpatientNutrition Counseling - Non-Surgical Weight Loss Program    REASON FOR VISIT:    Chief Complaint:    Chief Complaint   Patient presents with    Weight Management     Weigh In         OBJECTIVE:  Physical Exam   LMP 09/19/2018                Patient gained 2.6lbs

## 2023-05-19 ENCOUNTER — OFFICE VISIT (OUTPATIENT)
Dept: BARIATRICS/WEIGHT MGMT | Age: 58
End: 2023-05-19

## 2023-05-19 VITALS — BODY MASS INDEX: 46.37 KG/M2 | HEIGHT: 64 IN | WEIGHT: 271.6 LBS

## 2023-05-19 DIAGNOSIS — E78.5 HYPERLIPIDEMIA, UNSPECIFIED HYPERLIPIDEMIA TYPE: ICD-10-CM

## 2023-05-19 DIAGNOSIS — E66.01 MORBID OBESITY WITH BMI OF 45.0-49.9, ADULT (HCC): Primary | ICD-10-CM

## 2023-05-19 DIAGNOSIS — I48.91 ATRIAL FIBRILLATION, UNSPECIFIED TYPE (HCC): ICD-10-CM

## 2023-05-19 NOTE — PROGRESS NOTES
OutpatientNutrition Counseling - Non-Surgical Weight Loss Program    REASON FOR VISIT:    Chief Complaint:    Chief Complaint   Patient presents with    Weight Management     Weigh in          OBJECTIVE:  Physical Exam   Ht 5' 3.5\" (1.613 m)   Wt 271 lb 9.6 oz (123.2 kg)   LMP 09/19/2018   BMI 47.36 kg/m²        Pt lost 4.6 lb

## 2023-05-23 ENCOUNTER — HOSPITAL ENCOUNTER (OUTPATIENT)
Dept: WOMENS IMAGING | Age: 58
Discharge: HOME | End: 2023-05-23
Payer: COMMERCIAL

## 2023-05-23 ENCOUNTER — HOSPITAL ENCOUNTER (OUTPATIENT)
Dept: ULTRASOUND IMAGING | Age: 58
Discharge: HOME | End: 2023-05-23
Payer: COMMERCIAL

## 2023-05-23 DIAGNOSIS — R92.8 ABNORMAL MAMMOGRAM OF RIGHT BREAST: ICD-10-CM

## 2023-05-23 PROCEDURE — G0279 TOMOSYNTHESIS, MAMMO: HCPCS

## 2023-05-23 PROCEDURE — 76642 ULTRASOUND BREAST LIMITED: CPT

## 2023-05-26 ENCOUNTER — OFFICE VISIT (OUTPATIENT)
Dept: BARIATRICS/WEIGHT MGMT | Age: 58
End: 2023-05-26

## 2023-05-26 VITALS — HEIGHT: 64 IN | BODY MASS INDEX: 46.13 KG/M2 | WEIGHT: 270.2 LBS

## 2023-05-26 DIAGNOSIS — E66.01 MORBID OBESITY WITH BMI OF 45.0-49.9, ADULT (HCC): Primary | ICD-10-CM

## 2023-05-26 PROCEDURE — 99999 PR OFFICE/OUTPT VISIT,PROCEDURE ONLY: CPT | Performed by: SURGERY

## 2023-05-26 NOTE — PROGRESS NOTES
OutpatientNutrition Counseling - Non-Surgical Weight Loss Program    REASON FOR VISIT:    Chief Complaint:    Chief Complaint   Patient presents with    Weight Management     Weigh In         OBJECTIVE:  Physical Exam   Ht 5' 3.5\" (1.613 m)   Wt 270 lb 3.2 oz (122.6 kg)   LMP 09/19/2018   BMI 47.11 kg/m²                Patient lost 1.4lbs

## 2023-06-02 ENCOUNTER — OFFICE VISIT (OUTPATIENT)
Dept: BARIATRICS/WEIGHT MGMT | Age: 58
End: 2023-06-02

## 2023-06-02 VITALS — HEIGHT: 64 IN | BODY MASS INDEX: 46.16 KG/M2 | WEIGHT: 270.4 LBS

## 2023-06-02 DIAGNOSIS — E66.01 OBESITY, MORBID, BMI 40.0-49.9 (HCC): Primary | ICD-10-CM

## 2023-06-02 PROCEDURE — 99999 PR OFFICE/OUTPT VISIT,PROCEDURE ONLY: CPT | Performed by: SURGERY

## 2023-06-02 NOTE — PROGRESS NOTES
OutpatientNutrition Counseling - Non-Surgical Weight Loss Program    REASON FOR VISIT:    Chief Complaint:    Chief Complaint   Patient presents with    Weight Management     Weigh in. OBJECTIVE:  Physical Exam   Ht 5' 3.5\" (1.613 m)   Wt 270 lb 6.4 oz (122.7 kg)   LMP 09/19/2018   BMI 47.15 kg/m²      Patient gained 0.2 pounds.

## 2023-06-06 ENCOUNTER — OFFICE VISIT (OUTPATIENT)
Dept: CARDIOLOGY CLINIC | Age: 58
End: 2023-06-06
Payer: COMMERCIAL

## 2023-06-06 VITALS
SYSTOLIC BLOOD PRESSURE: 136 MMHG | WEIGHT: 276 LBS | HEART RATE: 59 BPM | DIASTOLIC BLOOD PRESSURE: 74 MMHG | BODY MASS INDEX: 47.12 KG/M2 | HEIGHT: 64 IN

## 2023-06-06 DIAGNOSIS — I10 ESSENTIAL HYPERTENSION: ICD-10-CM

## 2023-06-06 DIAGNOSIS — Z86.79 S/P ABLATION OF ATRIAL FIBRILLATION: ICD-10-CM

## 2023-06-06 DIAGNOSIS — I48.0 PAROXYSMAL ATRIAL FIBRILLATION (HCC): Primary | ICD-10-CM

## 2023-06-06 DIAGNOSIS — Z98.890 S/P ABLATION OF ATRIAL FIBRILLATION: ICD-10-CM

## 2023-06-06 DIAGNOSIS — D68.69 HYPERCOAGULABLE STATE DUE TO PAROXYSMAL ATRIAL FIBRILLATION (HCC): ICD-10-CM

## 2023-06-06 DIAGNOSIS — I48.0 HYPERCOAGULABLE STATE DUE TO PAROXYSMAL ATRIAL FIBRILLATION (HCC): ICD-10-CM

## 2023-06-06 DIAGNOSIS — G47.33 OSA TREATED WITH BIPAP: ICD-10-CM

## 2023-06-06 PROCEDURE — 93000 ELECTROCARDIOGRAM COMPLETE: CPT | Performed by: NURSE PRACTITIONER

## 2023-06-06 PROCEDURE — 3075F SYST BP GE 130 - 139MM HG: CPT | Performed by: NURSE PRACTITIONER

## 2023-06-06 PROCEDURE — 3078F DIAST BP <80 MM HG: CPT | Performed by: NURSE PRACTITIONER

## 2023-06-06 PROCEDURE — 99214 OFFICE O/P EST MOD 30 MIN: CPT | Performed by: NURSE PRACTITIONER

## 2023-06-06 RX ORDER — AMIODARONE HYDROCHLORIDE 200 MG/1
100 TABLET ORAL DAILY
Qty: 30 TABLET | Refills: 0
Start: 2023-06-06

## 2023-06-06 NOTE — PROGRESS NOTES
Electrophysiology Follow up  Note      Reason for consultation: Atrial fibrillation    Chief complaint: follow up on cardioversion and ablation of atrial fibrillation    Referring physician:        Primary care physician: No primary care provider on file. History of Present Illness: This visit 6/6/2023  Patient is here today for 3-month follow-up status post ablation of atrial fibrillation. Patient reports that he has been feeling well. She denies chest pain, palpitations, shortness of breath, lightheadedness, dizziness, edema or syncope. She reports that she has lost 30 pounds since the ablation with diet and exercise. She is taking her medications as prescribed. Previous visit 2/10/2023  Patient here today for follow-up on ablation of atrial fibrillation. Patient reports that she has had 2 episodes of dizziness. She states that it came on suddenly after taking her morning medications. She states both episodes lasted about 3 hours and then resolved on their own. Patient recently had ablation for atrial fibrillation. Patient unfortunately went back into atrial fibrillation post ablation. Patient was started on amiodarone. Patient denies chest pain, shortness of breath, edema or syncope    Previous visit (12/29/2022)      Chief Complaint   Patient presents with    Consultation     Nahed Buitrago- thinks its due to being overweight  Palpitations- Pt is having palpitations, they are constant but not painful like they were last time she had them  No nicotine, no alcohol  Caffeine- rarely- hardly ever  Exercise- no    Shortness of Breath    Palpitations           Previous visit: (10/27/2017      Chief Complaint   Patient presents with    Atrial Fibrillation     Pt states palpitations happen rarely. Pt denies chest pain, shortness of breath, dizziness, and edema.             Previous visit:    Chief Complaint   Patient presents with    Atrial

## 2023-06-06 NOTE — PATIENT INSTRUCTIONS
DECREASE AMIODARONE   MG DAILY FOR 1 MONTH, THEN STOP    Please be informed that if you contact our office outside of normal business hours the physician on call cannot help with any scheduling or rescheduling issues, procedure instruction questions or any type of medication issue. We advise you for any urgent/emergency that you go to the nearest emergency room! PLEASE CALL OUR OFFICE DURING NORMAL BUSINESS HOURS    Monday - Friday   8 am to 5 pm    [place] [address]: [phone]    [place]:  [phone]    **It is YOUR responsibilty to bring medication bottles and/or updated medication list to [address] [place] [address]. This will allow us to better serve you and all your healthcare needs**    Thank you for allowing us to care for you today! We want to ensure we can follow your treatment plan and we strive to give you the best outcomes and experience possible. If you ever have a life threatening emergency and call 911 - for an ambulance (EMS)   Our providers can only care for you at:   [facility] or [facility]. Even if you have someone take you or you drive yourself we can only care for you in a [address] [facility] facility. Our providers are not setup at the other healthcare locations!

## 2023-06-09 ENCOUNTER — OFFICE VISIT (OUTPATIENT)
Dept: BARIATRICS/WEIGHT MGMT | Age: 58
End: 2023-06-09

## 2023-06-09 VITALS — BODY MASS INDEX: 46.2 KG/M2 | HEIGHT: 64 IN | WEIGHT: 270.6 LBS

## 2023-06-09 DIAGNOSIS — E66.01 MORBID OBESITY WITH BMI OF 50.0-59.9, ADULT (HCC): Primary | ICD-10-CM

## 2023-06-09 PROCEDURE — 99999 PR OFFICE/OUTPT VISIT,PROCEDURE ONLY: CPT | Performed by: SURGERY

## 2023-06-09 NOTE — PROGRESS NOTES
OutpatientNutrition Counseling - Non-Surgical Weight Loss Program    REASON FOR VISIT:    Chief Complaint:    Chief Complaint   Patient presents with    Weight Management     Weigh in          OBJECTIVE:  Physical Exam   Ht 5' 3.5\" (1.613 m)   Wt 270 lb 9.6 oz (122.7 kg)   LMP 09/19/2018   BMI 47.18 kg/m²                Pt gained 0.2lb

## 2023-06-15 NOTE — PROGRESS NOTES
OutpatientNutrition Counseling - Non-Surgical Weight Loss Program    REASON FOR VISIT:    Chief Complaint:    Chief Complaint   Patient presents with    Weight Management     Weigh In         OBJECTIVE:  Physical Exam   Ht 5' 3.5\" (1.613 m)   Wt 273 lb 9.6 oz (124.1 kg)   LMP 09/19/2018   BMI 47.71 kg/m²                Patient lost 1.8lbs Topical Sulfur Applications Pregnancy And Lactation Text: This medication is Pregnancy Category C and has an unknown safety profile during pregnancy. It is unknown if this topical medication is excreted in breast milk.

## 2023-06-30 ENCOUNTER — OFFICE VISIT (OUTPATIENT)
Dept: BARIATRICS/WEIGHT MGMT | Age: 58
End: 2023-06-30

## 2023-06-30 VITALS — HEIGHT: 64 IN | BODY MASS INDEX: 46.44 KG/M2 | WEIGHT: 272 LBS

## 2023-06-30 DIAGNOSIS — E66.01 MORBID OBESITY WITH BMI OF 50.0-59.9, ADULT (HCC): Primary | ICD-10-CM

## 2023-08-03 ENCOUNTER — OFFICE VISIT (OUTPATIENT)
Dept: FAMILY MEDICINE CLINIC | Age: 58
End: 2023-08-03
Payer: COMMERCIAL

## 2023-08-03 VITALS
TEMPERATURE: 98.2 F | WEIGHT: 276 LBS | HEART RATE: 61 BPM | OXYGEN SATURATION: 96 % | BODY MASS INDEX: 48.12 KG/M2 | SYSTOLIC BLOOD PRESSURE: 148 MMHG | DIASTOLIC BLOOD PRESSURE: 82 MMHG

## 2023-08-03 DIAGNOSIS — L03.114 CELLULITIS OF ARM, LEFT: Primary | ICD-10-CM

## 2023-08-03 PROCEDURE — 99213 OFFICE O/P EST LOW 20 MIN: CPT | Performed by: NURSE PRACTITIONER

## 2023-08-03 PROCEDURE — 3079F DIAST BP 80-89 MM HG: CPT | Performed by: NURSE PRACTITIONER

## 2023-08-03 PROCEDURE — 3077F SYST BP >= 140 MM HG: CPT | Performed by: NURSE PRACTITIONER

## 2023-08-03 RX ORDER — CARBOXYMETHYLCELLULOSE/CITRIC 0.75 G
3 CAPSULE ORAL
COMMUNITY
Start: 2023-02-08

## 2023-08-03 RX ORDER — DOXYCYCLINE HYCLATE 100 MG
100 TABLET ORAL 2 TIMES DAILY
Qty: 20 TABLET | Refills: 0 | Status: SHIPPED | OUTPATIENT
Start: 2023-08-03 | End: 2023-08-13

## 2023-08-03 ASSESSMENT — ENCOUNTER SYMPTOMS
NAUSEA: 0
SINUS PAIN: 0
COUGH: 0
RHINORRHEA: 0
SORE THROAT: 0
SINUS PRESSURE: 0
DIARRHEA: 0
SHORTNESS OF BREATH: 0
CHEST TIGHTNESS: 0
VOMITING: 0
WHEEZING: 0

## 2023-08-03 NOTE — PROGRESS NOTES
Filemon Spring   59 y.o.  female  [id number]      Chief Complaint   Patient presents with    Insect Bite     Left arm near elbow-since 1 week-getting worse per pt        Subjective:  59 y. o.female is here for a follow up. She has the following chronic/acute medical problems:  Patient Active Problem List   Diagnosis    Hyperlipidemia    Obesity, morbid, BMI 40.0-49.9 ([address])    H/O echocardiogram    Metabolic syndrome    Venous stasis dermatitis of both lower extremities    Dependent edema    Atrial fibrillation with RVR ([facility])    Essential hypertension    PAF (paroxysmal atrial fibrillation) ([facility])    MARLINE treated with BiPAP    Neck pain on left side    Cervical radiculopathy    Left lateral epicondylitis    Anticoagulated    Colon polyps    VHD (valvular heart disease)    S/P ablation of atrial fibrillation    Hypercoagulability due to atrial fibrillation ([facility])    Paroxysmal atrial fibrillation ([address])       Patient states a week ago she got stung by a bee in her left arm. Patient states it started as looking like a mosquito bite. Patient states now the area is, red, warm to touch, swollen, and tender. Patient states that happened about two days ago Patient states the area is not so much tender when palpated but burns - states at this point very little itching. Review of Systems   Constitutional:  Negative for appetite change, chills, fatigue and fever. HENT:  Negative for congestion, ear pain, postnasal drip, rhinorrhea, sinus pressure, sinus pain, sneezing and sore throat. Respiratory:  Negative for cough, chest tightness, shortness of breath and wheezing. Cardiovascular:  Negative for chest pain and palpitations. Gastrointestinal:  Negative for diarrhea, nausea and vomiting. Skin:  Negative for rash. Neurological:  Negative for dizziness, light-headedness and headaches.      Current Outpatient Medications   Medication Sig Dispense Refill    Carboxymeth-Cellulose-CitricAc (PLENITY) CAPS Take 3

## 2023-09-01 RX ORDER — AMIODARONE HYDROCHLORIDE 200 MG/1
TABLET ORAL
Qty: 30 TABLET | Refills: 0 | Status: SHIPPED | OUTPATIENT
Start: 2023-09-01

## 2023-09-28 ENCOUNTER — OFFICE VISIT (OUTPATIENT)
Dept: ORTHOPEDIC SURGERY | Age: 58
End: 2023-09-28
Payer: COMMERCIAL

## 2023-09-28 VITALS
HEART RATE: 57 BPM | BODY MASS INDEX: 46.1 KG/M2 | OXYGEN SATURATION: 95 % | HEIGHT: 64 IN | WEIGHT: 270 LBS | RESPIRATION RATE: 12 BRPM

## 2023-09-28 DIAGNOSIS — M79.89 SOFT TISSUE MASS: Primary | ICD-10-CM

## 2023-09-28 PROCEDURE — 99203 OFFICE O/P NEW LOW 30 MIN: CPT | Performed by: ORTHOPAEDIC SURGERY

## 2023-09-28 ASSESSMENT — ENCOUNTER SYMPTOMS
WHEEZING: 0
EYE PAIN: 0
EYE REDNESS: 0
VOMITING: 0
COLOR CHANGE: 0
CHEST TIGHTNESS: 0
SHORTNESS OF BREATH: 0

## 2023-09-28 NOTE — PROGRESS NOTES
9/28/2023   Chief Complaint   Patient presents with    Wrist Pain     Right        History of Present Illness:                             Harrison Garcia is a 59 y.o. female who presents today for evaluation of her right wrist soft tissue mass at the dorsal aspect of the distal forearm. She initially noticed a dry patch of skin at the dorsal aspect of her distal forearm and wrist area a few years ago but has noticed prominence and stable fullness of the soft tissues with no significant progression over the last 2 years. .  She then developed increased soft tissue swelling but no sensitivity in the area. She has noticed a prominent full area of soft tissue at the dorsal ulnar aspect of her distal forearm and wrist area    She denies any trauma or injuries to the area. No redness or drainage. No decreased strength or range of motion at the wrist.    Patient returns to the office with a knot on the dorsal aspect of her hand/wrist. Pt stated that she has noticed it for about 10 years but it has been progressively growing. Pt stated that she has no pain associated with the cyst but  believes its aggravating. Pt stated she has seen a dermatologist and plastic surgery but did not feel comfortable with the removing it. Pt has not had any treatments. Medical History  Patient's medications, allergies, past medical, surgical, social and family histories were reviewed and updated as appropriate. Past Medical History:   Diagnosis Date    Atrial fibrillation with RVR ([address]) 07/01/2017    Bell palsy     right side of face affected    H/O 24 hour EKG monitoring 03/30/2010    holter: other then an episode of sinus tach at 150 bpm no other clinically significant arrhythmia seen. Symptoms reported in pts diary do not correlate with this episode    H/O cardiovascular stress test 01/14/2010    Joe protocol: normal stress study.  gated study shows uniform wall motion with calculated LV EF of over 67 %    H/O

## 2023-09-28 NOTE — PROGRESS NOTES
Patient returns to the office with a knot on the dorsal aspect of her hand/wrist. Pt stated that she has noticed it for about 10 years but it has been progressively growing. Pt stated that she has no pain associated with the cyst but  believes its aggravating. Pt stated she has seen a dermatologist and plastic surgery but did not feel comfortable with the removing it. Pt has not had any treatments.

## 2023-09-28 NOTE — PATIENT INSTRUCTIONS
Tylenol or Motrin for pain. Right wrist MRI with contrast to evaluate to a soft tissue mass. [address] will be calling you to get you scheduled. Follow up after MRI is completed.

## 2023-10-04 ENCOUNTER — TELEPHONE (OUTPATIENT)
Dept: ORTHOPEDIC SURGERY | Age: 58
End: 2023-10-04

## 2023-10-04 NOTE — TELEPHONE ENCOUNTER
Patient can not have an MRI with contrast due to her having heart issues.  Pt is requesting and MRI without contrast and an open MRI

## 2023-10-11 DIAGNOSIS — M25.531 RIGHT WRIST PAIN: ICD-10-CM

## 2023-10-11 DIAGNOSIS — M79.89 SOFT TISSUE MASS: Primary | ICD-10-CM

## 2023-10-16 RX ORDER — FENOFIBRATE 145 MG/1
145 TABLET, COATED ORAL DAILY
Qty: 90 TABLET | Refills: 3 | Status: SHIPPED | OUTPATIENT
Start: 2023-10-16

## 2023-10-18 ENCOUNTER — TELEPHONE (OUTPATIENT)
Dept: CARDIOLOGY CLINIC | Age: 58
End: 2023-10-18

## 2023-10-18 NOTE — TELEPHONE ENCOUNTER
Patient called her PCP has perscribed Prednisone for sciatica she is making sure its ok to take with her heart conditions

## 2023-10-25 ENCOUNTER — OFFICE VISIT (OUTPATIENT)
Dept: CARDIOLOGY CLINIC | Age: 58
End: 2023-10-25
Payer: COMMERCIAL

## 2023-10-25 VITALS
SYSTOLIC BLOOD PRESSURE: 140 MMHG | WEIGHT: 279 LBS | HEART RATE: 71 BPM | DIASTOLIC BLOOD PRESSURE: 78 MMHG | HEIGHT: 63 IN | BODY MASS INDEX: 49.43 KG/M2

## 2023-10-25 DIAGNOSIS — I48.0 PAF (PAROXYSMAL ATRIAL FIBRILLATION) (HCC): Primary | ICD-10-CM

## 2023-10-25 DIAGNOSIS — I48.0 HYPERCOAGULABLE STATE DUE TO PAROXYSMAL ATRIAL FIBRILLATION (HCC): ICD-10-CM

## 2023-10-25 DIAGNOSIS — D68.69 HYPERCOAGULABLE STATE DUE TO PAROXYSMAL ATRIAL FIBRILLATION (HCC): ICD-10-CM

## 2023-10-25 DIAGNOSIS — E03.9 HYPOTHYROIDISM (ACQUIRED): ICD-10-CM

## 2023-10-25 DIAGNOSIS — I10 PRIMARY HYPERTENSION: ICD-10-CM

## 2023-10-25 PROCEDURE — 99214 OFFICE O/P EST MOD 30 MIN: CPT | Performed by: NURSE PRACTITIONER

## 2023-10-25 PROCEDURE — 3074F SYST BP LT 130 MM HG: CPT | Performed by: NURSE PRACTITIONER

## 2023-10-25 PROCEDURE — 3078F DIAST BP <80 MM HG: CPT | Performed by: NURSE PRACTITIONER

## 2023-10-25 PROCEDURE — 93000 ELECTROCARDIOGRAM COMPLETE: CPT | Performed by: NURSE PRACTITIONER

## 2023-10-25 NOTE — PROGRESS NOTES
Fibrillation     MO-discuss afib ablation. Attended class on 7/14. Patient denies CP, dizziness, edema. She does report palpitations constantly as well as SOB at times due to the palpitations. Patient is still wearing event monitor, it is due to come off tomorrow. Patient was in the hospital in Atrial fibrillation in July. Denies syncope  On medications last few days she has been much better. Patient has symptoms of sleep apnea - snoring - tired when awake and sleeps in the day. Past medical history:   Past Medical History:   Diagnosis Date    Atrial fibrillation with RVR ([address]) 07/01/2017    Bell palsy     right side of face affected    H/O 24 hour EKG monitoring 03/30/2010    holter: other then an episode of sinus tach at 150 bpm no other clinically significant arrhythmia seen. Symptoms reported in pts diary do not correlate with this episode    H/O cardiovascular stress test 01/14/2010    Joe protocol: normal stress study. gated study shows uniform wall motion with calculated LV EF of over 67 %    H/O echocardiogram 05/20/2010    ECHO:  There is no comparison study available. There is mild concentric LV hypertrophy. LVSF is normal, EF = > 55 %    H/O echocardiogram 07/18/2014    EF55%, borderline left atrial enlargement, normal LV systolic function, mild mitral regurg, no pericardial effusion. H/O echocardiogram 07/03/2017    EF55%  AV mild  sclerotic    H/O echocardiogram 11/07/2018    EF55-60% sclerotic non stenosed AV    H/O transesophageal echocardiography (ANA) for monitoring 12/14/2022    Calcification noted on the non-coronary cusp of the aortic valve. MILD MR.     Heart palpitations     History of cardiac monitoring 07/21/2014    Event - no clinically significant arrythmias    History of cardiac monitoring 08/10/2017    No clinically signifacant arrythmias    History of complete ECG 05/11/2010    HX OTHER MEDICAL     \"not sure if have sleep apnea- have cpap but the last sleep study not
Kenneth Velez ) 146/80 (!) 140/78   Site: Left Upper Arm Right Upper Arm   Position: Sitting Sitting   Cuff Size: Large Adult Large Adult   Pulse: 71    Weight: 126.6 kg (279 lb)    Height: 1.6 m (5' 3\")           Physical Exam   Constitutional: She is oriented to person, place, and time and well-developed, well-nourished, and in no distress. HENT:   Head: Normocephalic and atraumatic. Eyes: Conjunctivae are normal. Pupils are equal, round, and reactive to light. Right and left eye exhibits no discharge. Neck: Normal range of motion. No JVD present. No thyromegaly present. Cardiovascular: irregular rhythm   Pulmonary/Chest: Effort normal and breath sounds normal. No stridor. No respiratory distress. She has no wheezes. Abdominal: Soft. Bowel sounds are normal. She exhibits no distension. There is no tenderness. Musculoskeletal: Normal range of motion. She exhibits no edema or tenderness. Neurological: She is alert and oriented to person, place, and time. She displays normal reflexes. No cranial nerve deficit. Coordination normal.   Skin: Skin is warm and dry. No rash noted. No erythema.  Psychiatric: Mood and affect normal.             CBC:   Lab Results   Component Value Date/Time    WBC 6.1 02/21/2023 10:00 AM    HGB 16.5 02/21/2023 10:00 AM    HCT 49.9 02/21/2023 10:00 AM     02/21/2023 10:00 AM     Lipids:   Lab Results   Component Value Date    CHOL 176 05/09/2023    TRIG 135 05/09/2023    HDL 44 05/09/2023    LDLCALC 105 (H) 05/09/2023    LDLDIRECT 88 01/20/2020     PT/INR:   Lab Results   Component Value Date/Time    INR 1.85 02/21/2023 10:00 AM        BMP:    Lab Results   Component Value Date     02/21/2023    K 3.9 02/21/2023    CL 99 02/21/2023    CO2 26 02/21/2023    BUN 22 02/21/2023     CMP:   Lab Results   Component Value Date    AST 19 07/01/2017    PROT 7.0 07/01/2017    BILITOT 0.4 07/01/2017    ALKPHOS 61 07/01/2017     TSH:    Lab Results   Component Value Date/Time    TSH 3.54

## 2023-10-26 PROBLEM — I48.0 HYPERCOAGULABLE STATE DUE TO PAROXYSMAL ATRIAL FIBRILLATION (HCC): Status: ACTIVE | Noted: 2023-02-28

## 2023-10-26 PROBLEM — E03.9 HYPOTHYROIDISM (ACQUIRED): Status: ACTIVE | Noted: 2023-10-26

## 2023-10-26 PROBLEM — I10 PRIMARY HYPERTENSION: Status: ACTIVE | Noted: 2023-10-26

## 2023-10-30 RX ORDER — FUROSEMIDE 40 MG/1
40 TABLET ORAL 2 TIMES DAILY
Qty: 180 TABLET | Refills: 1 | Status: SHIPPED | OUTPATIENT
Start: 2023-10-30

## 2023-10-30 RX ORDER — FUROSEMIDE 40 MG/1
40 TABLET ORAL 2 TIMES DAILY
Qty: 180 TABLET | Refills: 1 | OUTPATIENT
Start: 2023-10-30

## 2024-01-29 RX ORDER — POTASSIUM CHLORIDE 750 MG/1
10 TABLET, EXTENDED RELEASE ORAL 2 TIMES DAILY
Qty: 180 TABLET | Refills: 3 | Status: SHIPPED | OUTPATIENT
Start: 2024-01-29

## 2024-02-11 DIAGNOSIS — E78.5 HYPERLIPIDEMIA, UNSPECIFIED HYPERLIPIDEMIA TYPE: ICD-10-CM

## 2024-02-11 DIAGNOSIS — I48.91 ATRIAL FIBRILLATION, UNSPECIFIED TYPE (HCC): ICD-10-CM

## 2024-02-26 NOTE — PROGRESS NOTES
11/09/2017  sleep study results for Nnamdi Krause  1965 are finalized and available. Please see media tab.     Electronically signed by Zurdo Cai on 11/16/2017 at 10:13 AM
11/10/2017  sleep study  for Pastor Inch  1965 is complete. Results are pending physician review.     Electronically signed by Nava Robles on 11/10/2017 at 7:22 AM
[place]

## 2024-03-22 ENCOUNTER — TELEPHONE (OUTPATIENT)
Dept: CARDIOLOGY CLINIC | Age: 59
End: 2024-03-22

## 2024-03-26 ENCOUNTER — TELEPHONE (OUTPATIENT)
Dept: CARDIOLOGY CLINIC | Age: 59
End: 2024-03-26

## 2024-03-26 NOTE — TELEPHONE ENCOUNTER
Called patient to r/s patients apt on 5/1 Junie not in [place] that day, she is Bam. No ans, LM for patient to call back.

## 2024-04-25 ENCOUNTER — HOSPITAL ENCOUNTER (OUTPATIENT)
Dept: WOMENS IMAGING | Age: 59
Discharge: HOME | End: 2024-04-25
Payer: COMMERCIAL

## 2024-04-25 VITALS — HEIGHT: 63 IN | BODY MASS INDEX: 51.38 KG/M2 | WEIGHT: 290 LBS

## 2024-04-25 DIAGNOSIS — Z12.31 SCREENING MAMMOGRAM, ENCOUNTER FOR: ICD-10-CM

## 2024-04-25 PROCEDURE — 77063 BREAST TOMOSYNTHESIS BI: CPT

## 2024-04-28 RX ORDER — FUROSEMIDE 40 MG/1
40 TABLET ORAL 2 TIMES DAILY
Qty: 180 TABLET | Refills: 1 | Status: SHIPPED | OUTPATIENT
Start: 2024-04-28

## 2024-05-21 ENCOUNTER — OFFICE VISIT (OUTPATIENT)
Dept: CARDIOLOGY CLINIC | Age: 59
End: 2024-05-21
Payer: COMMERCIAL

## 2024-05-21 ENCOUNTER — NURSE ONLY (OUTPATIENT)
Dept: CARDIOLOGY CLINIC | Age: 59
End: 2024-05-21
Payer: COMMERCIAL

## 2024-05-21 VITALS
WEIGHT: 290.2 LBS | DIASTOLIC BLOOD PRESSURE: 72 MMHG | SYSTOLIC BLOOD PRESSURE: 134 MMHG | BODY MASS INDEX: 51.42 KG/M2 | HEIGHT: 63 IN | HEART RATE: 64 BPM | OXYGEN SATURATION: 95 %

## 2024-05-21 DIAGNOSIS — Z86.79 S/P ABLATION OF ATRIAL FIBRILLATION: Primary | ICD-10-CM

## 2024-05-21 DIAGNOSIS — D68.69 HYPERCOAGULABLE STATE DUE TO PAROXYSMAL ATRIAL FIBRILLATION (HCC): ICD-10-CM

## 2024-05-21 DIAGNOSIS — I48.0 HYPERCOAGULABLE STATE DUE TO PAROXYSMAL ATRIAL FIBRILLATION (HCC): ICD-10-CM

## 2024-05-21 DIAGNOSIS — G47.33 OSA TREATED WITH BIPAP: ICD-10-CM

## 2024-05-21 DIAGNOSIS — E03.8 OTHER SPECIFIED HYPOTHYROIDISM: ICD-10-CM

## 2024-05-21 DIAGNOSIS — Z98.890 S/P ABLATION OF ATRIAL FIBRILLATION: Primary | ICD-10-CM

## 2024-05-21 DIAGNOSIS — I10 ESSENTIAL HYPERTENSION: ICD-10-CM

## 2024-05-21 PROCEDURE — 99214 OFFICE O/P EST MOD 30 MIN: CPT | Performed by: NURSE PRACTITIONER

## 2024-05-21 PROCEDURE — 36415 COLL VENOUS BLD VENIPUNCTURE: CPT | Performed by: INTERNAL MEDICINE

## 2024-05-21 PROCEDURE — 3078F DIAST BP <80 MM HG: CPT | Performed by: NURSE PRACTITIONER

## 2024-05-21 PROCEDURE — 3075F SYST BP GE 130 - 139MM HG: CPT | Performed by: NURSE PRACTITIONER

## 2024-05-21 PROCEDURE — 93000 ELECTROCARDIOGRAM COMPLETE: CPT | Performed by: NURSE PRACTITIONER

## 2024-05-21 NOTE — PROGRESS NOTES
Electrophysiology Follow up  Note      Reason for consultation: Atrial fibrillation    Chief complaint: follow up on cardioversion and ablation of atrial fibrillation    Referring physician:        Primary care physician: Kam Irizarry MD      History of Present Illness:     This visit 5/21/2024  Patient is here today for follow-up on ablation of atrial fibrillation.  She reports that she is feeling well.  She denies chest pain, palpitations, shortness of breath, lightheadedness, dizziness, edema or syncope.  Patient reports that she has had problems tolerating thyroid medication.    Previous visit 6/6/2023  Patient is here today for 3-month follow-up status post ablation of atrial fibrillation.  Patient reports that he has been feeling well.  She denies chest pain, palpitations, shortness of breath, lightheadedness, dizziness, edema or syncope.  She reports that she has lost 30 pounds since the ablation with diet and exercise.  She is taking her medications as prescribed.     Previous visit 2/10/2023  Patient here today for follow-up on ablation of atrial fibrillation.  Patient reports that she has had 2 episodes of dizziness.  She states that it came on suddenly after taking her morning medications.  She states both episodes lasted about 3 hours and then resolved on their own.  Patient recently had ablation for atrial fibrillation.  Patient unfortunately went back into atrial fibrillation post ablation.  Patient was started on amiodarone.  Patient denies chest pain, shortness of breath, edema or syncope    Previous visit (12/29/2022)      Chief Complaint   Patient presents with    Consultation     SOBOE- thinks its due to being overweight  Palpitations- Pt is having palpitations, they are constant but not painful like they were last time she had them  No nicotine, no alcohol  Caffeine- rarely- hardly ever  Exercise- no    Shortness of Breath    Palpitations

## 2024-05-21 NOTE — PATIENT INSTRUCTIONS
**It is YOUR responsibilty to bring medication bottles and/or updated medication list to EACH APPOINTMENT. This will allow us to better serve you and all your healthcare needs**   Thank you for allowing us to care for you today!   We want to ensure we can follow your treatment plan and we strive to give you the best outcomes and experience possible.   If you ever have a life threatening emergency and call 911 - for an ambulance (EMS)   Our providers can only care for you at:   [facility] or [facility].   Even if you have someone take you or you drive yourself we can only care for you in a [facility]. Our providers are not setup at the other healthcare locations!   Please be informed that if you contact our office outside of normal business hours the physician on call cannot help with any scheduling or rescheduling issues, procedure instruction questions or any type of medication issue.    We advise you for any urgent/emergency that you go to the nearest emergency room!    PLEASE CALL OUR OFFICE DURING NORMAL BUSINESS HOURS    Monday - Friday   8 am to 5 pm    [place]: [phone]    [place]: [phone]    [place]:  [phone]  We are committed to providing you the best care possible.    If you receive a survey after visiting one of our offices, please take time to share your experience concerning your physician office visit.  These surveys are confidential and no health information about you is shared.    We are eager to improve for you and we are counting on your feedback to help make that happen.

## 2024-05-22 LAB — TSH SERPL DL<=0.005 MIU/L-ACNC: 3.91 UIU/ML (ref 0.27–4.2)

## 2024-10-07 RX ORDER — FENOFIBRATE 145 MG/1
145 TABLET, COATED ORAL DAILY
Qty: 90 TABLET | Refills: 3 | Status: SHIPPED | OUTPATIENT
Start: 2024-10-07

## 2024-10-11 RX ORDER — FUROSEMIDE 40 MG
40 TABLET ORAL 2 TIMES DAILY
Qty: 60 TABLET | Refills: 1 | Status: SHIPPED | OUTPATIENT
Start: 2024-10-11

## 2024-10-11 NOTE — TELEPHONE ENCOUNTER
Pt's pharmacy just sent over her refill on Furosemide 40 mg.  Pt is out of medication completely.  Is there away this could be signed off before the end of day?     Please call patient and advise.

## 2024-10-14 RX ORDER — FUROSEMIDE 40 MG
40 TABLET ORAL 2 TIMES DAILY
Qty: 180 TABLET | Refills: 1 | Status: SHIPPED | OUTPATIENT
Start: 2024-10-14

## 2024-10-25 ENCOUNTER — TELEPHONE (OUTPATIENT)
Dept: CARDIOLOGY CLINIC | Age: 59
End: 2024-10-25

## 2024-10-25 NOTE — TELEPHONE ENCOUNTER
Patient called she was admitted to [place]  Had CV 10/22 LHC 10/23 was in afib  Needs a follow up please advise.

## 2024-10-25 NOTE — TELEPHONE ENCOUNTER
Called patient and f/u with Junie from CV done at [facility] scheduled for 10/31 @ 0915, f/u with Nancy from heat cath at [facility] on 11/6 @ 340.

## 2024-10-31 ENCOUNTER — OFFICE VISIT (OUTPATIENT)
Dept: CARDIOLOGY CLINIC | Age: 59
End: 2024-10-31
Payer: COMMERCIAL

## 2024-10-31 VITALS
DIASTOLIC BLOOD PRESSURE: 62 MMHG | BODY MASS INDEX: 49.78 KG/M2 | WEIGHT: 291.6 LBS | HEART RATE: 59 BPM | SYSTOLIC BLOOD PRESSURE: 120 MMHG | HEIGHT: 64 IN

## 2024-10-31 DIAGNOSIS — Z92.89 HISTORY OF CARDIOVERSION: ICD-10-CM

## 2024-10-31 DIAGNOSIS — I48.0 PAROXYSMAL ATRIAL FIBRILLATION (HCC): Primary | ICD-10-CM

## 2024-10-31 DIAGNOSIS — G47.33 OSA TREATED WITH BIPAP: ICD-10-CM

## 2024-10-31 DIAGNOSIS — Z86.79 S/P ABLATION OF ATRIAL FIBRILLATION: ICD-10-CM

## 2024-10-31 DIAGNOSIS — Z98.890 S/P ABLATION OF ATRIAL FIBRILLATION: ICD-10-CM

## 2024-10-31 DIAGNOSIS — D68.69 HYPERCOAGULABLE STATE DUE TO PAROXYSMAL ATRIAL FIBRILLATION (HCC): ICD-10-CM

## 2024-10-31 DIAGNOSIS — I10 ESSENTIAL HYPERTENSION: ICD-10-CM

## 2024-10-31 DIAGNOSIS — I48.0 HYPERCOAGULABLE STATE DUE TO PAROXYSMAL ATRIAL FIBRILLATION (HCC): ICD-10-CM

## 2024-10-31 PROCEDURE — 3078F DIAST BP <80 MM HG: CPT | Performed by: NURSE PRACTITIONER

## 2024-10-31 PROCEDURE — 99214 OFFICE O/P EST MOD 30 MIN: CPT | Performed by: NURSE PRACTITIONER

## 2024-10-31 PROCEDURE — 93000 ELECTROCARDIOGRAM COMPLETE: CPT | Performed by: NURSE PRACTITIONER

## 2024-10-31 PROCEDURE — 3074F SYST BP LT 130 MM HG: CPT | Performed by: NURSE PRACTITIONER

## 2024-10-31 NOTE — PROGRESS NOTES
0    female Female - 1   CHF HX: No - 0   HTN HX: Yes - 1   Stroke/TIA/Thromboembolism No - 0   Vascular Disease HX: No - 0   Diabetes Mellitus No - 0   CHADSVASC 2 Score 2      Annual Stroke Risk 2.2%- moderate-high

## 2024-11-06 ENCOUNTER — OFFICE VISIT (OUTPATIENT)
Dept: CARDIOLOGY CLINIC | Age: 59
End: 2024-11-06
Payer: COMMERCIAL

## 2024-11-06 VITALS
WEIGHT: 293 LBS | BODY MASS INDEX: 50.02 KG/M2 | HEART RATE: 63 BPM | DIASTOLIC BLOOD PRESSURE: 78 MMHG | HEIGHT: 64 IN | SYSTOLIC BLOOD PRESSURE: 132 MMHG

## 2024-11-06 DIAGNOSIS — I48.0 PAROXYSMAL ATRIAL FIBRILLATION (HCC): Primary | ICD-10-CM

## 2024-11-06 DIAGNOSIS — I10 PRIMARY HYPERTENSION: ICD-10-CM

## 2024-11-06 PROBLEM — I38 VHD (VALVULAR HEART DISEASE): Status: RESOLVED | Noted: 2022-11-04 | Resolved: 2024-11-06

## 2024-11-06 PROCEDURE — 99214 OFFICE O/P EST MOD 30 MIN: CPT | Performed by: NURSE PRACTITIONER

## 2024-11-06 PROCEDURE — 3078F DIAST BP <80 MM HG: CPT | Performed by: NURSE PRACTITIONER

## 2024-11-06 PROCEDURE — 3075F SYST BP GE 130 - 139MM HG: CPT | Performed by: NURSE PRACTITIONER

## 2024-11-06 ASSESSMENT — ENCOUNTER SYMPTOMS
SHORTNESS OF BREATH: 0
ORTHOPNEA: 0

## 2024-11-06 NOTE — PROGRESS NOTES
11/6/2024  Primary cardiologist: Dr. Lagos    CC:   Janay  is an established 59 y.o.  female here for a follow up on [facility]      SUBJECTIVE/OBJECTIVE:  Janay is a 59 y.o. female with a history of persistent atrial fibrillation, s/p ablation of AF, VHD hypertension, hyperlipidemia, obesity, metabolic syndrome, sleep apnea and chronic anticoagulation use.     HPI:  Janay reports in October was admitted to the hospital with palpitation noted to be in atrial for with RVR.  Noted to have elevated troponins and underwent cardiac catheterization that showed normal coronaries.   She did follow with electrophysiology and was started on Multaq.  She is feeling better.  No longer has palpitations or shortness of breath    Review of Systems   Constitutional: Negative for diaphoresis and malaise/fatigue.   Cardiovascular:  Negative for chest pain, claudication, dyspnea on exertion, irregular heartbeat, leg swelling, near-syncope, orthopnea, palpitations and paroxysmal nocturnal dyspnea.   Respiratory:  Negative for shortness of breath.    Neurological:  Negative for dizziness and light-headedness.       Vitals:    11/06/24 1543   BP: 132/78   Site: Left Upper Arm   Position: Sitting   Cuff Size: Large Adult   Pulse: 63   Weight: 133.5 kg (294 lb 6.4 oz)   Height: 1.626 m (5' 4\")     Wt Readings from Last 3 Encounters:   11/06/24 133.5 kg (294 lb 6.4 oz)   10/31/24 132.3 kg (291 lb 9.6 oz)   05/21/24 131.6 kg (290 lb 3.2 oz)      Body mass index is 50.53 kg/m².     Physical Exam  Vitals reviewed.   Constitutional:       Appearance: She is obese.   Eyes:      Pupils: Pupils are equal, round, and reactive to light.   Neck:      Vascular: No carotid bruit.   Cardiovascular:      Rate and Rhythm: Normal rate and regular rhythm.      Pulses: Normal pulses.   Pulmonary:      Effort: Pulmonary effort is normal.      Breath sounds: Normal breath sounds. No rales.   Chest:      Chest wall: No tenderness.   Musculoskeletal:

## 2024-12-03 ENCOUNTER — TELEPHONE (OUTPATIENT)
Dept: CARDIOLOGY CLINIC | Age: 59
End: 2024-12-03

## 2024-12-03 RX ORDER — METOPROLOL TARTRATE 25 MG/1
12.5 TABLET, FILM COATED ORAL 2 TIMES DAILY
Qty: 90 TABLET | Refills: 0 | Status: SHIPPED | OUTPATIENT
Start: 2024-12-03

## 2024-12-03 NOTE — TELEPHONE ENCOUNTER
Pt states Junie put her on Multaw 400mg and since she put her on this her heart rate has been in low 50's and feeling sluggish. She thinks it is the medication causing this.

## 2024-12-03 NOTE — TELEPHONE ENCOUNTER
Called patient and advised per Junie, take metoprolol 12.5 mg (1/2 tablet) 2 x daily. Advised patient if still no better to call back. Patient voiced understanding.

## 2024-12-11 RX ORDER — FUROSEMIDE 40 MG/1
40 TABLET ORAL 2 TIMES DAILY
Qty: 60 TABLET | Refills: 5 | OUTPATIENT
Start: 2024-12-11

## 2025-01-01 ENCOUNTER — HOSPITAL ENCOUNTER (INPATIENT)
Age: 60
LOS: 1 days | Discharge: HOME | DRG: 309 | End: 2025-01-03
Attending: EMERGENCY MEDICINE | Admitting: INTERNAL MEDICINE
Payer: COMMERCIAL

## 2025-01-01 ENCOUNTER — APPOINTMENT (OUTPATIENT)
Dept: GENERAL RADIOLOGY | Age: 60
DRG: 309 | End: 2025-01-01
Payer: COMMERCIAL

## 2025-01-01 DIAGNOSIS — I47.29 NON-SUSTAINED VENTRICULAR TACHYCARDIA (HCC): ICD-10-CM

## 2025-01-01 DIAGNOSIS — I10 ESSENTIAL HYPERTENSION: ICD-10-CM

## 2025-01-01 DIAGNOSIS — N28.9 ACUTE RENAL INSUFFICIENCY: ICD-10-CM

## 2025-01-01 DIAGNOSIS — I48.91 ATRIAL FIBRILLATION, UNSPECIFIED TYPE (HCC): ICD-10-CM

## 2025-01-01 DIAGNOSIS — R79.89 ELEVATED TROPONIN: ICD-10-CM

## 2025-01-01 DIAGNOSIS — I48.91 ATRIAL FIBRILLATION WITH RVR (HCC): Primary | ICD-10-CM

## 2025-01-01 DIAGNOSIS — R07.9 CHEST PAIN, UNSPECIFIED TYPE: ICD-10-CM

## 2025-01-01 LAB
ERYTHROCYTE [DISTWIDTH] IN BLOOD BY AUTOMATED COUNT: 13 % (ref 11.7–14.9)
HCT VFR BLD AUTO: 48.2 % (ref 37–47)
HGB BLD-MCNC: 16.8 G/DL (ref 12.5–16)
MCH RBC QN AUTO: 29.8 PG (ref 27–31)
MCHC RBC AUTO-ENTMCNC: 34.9 G/DL (ref 32–36)
MCV RBC AUTO: 85.6 FL (ref 78–100)
PLATELET # BLD AUTO: 353 K/UL (ref 140–440)
PMV BLD AUTO: 9.3 FL (ref 7.5–11.1)
RBC # BLD AUTO: 5.63 M/UL (ref 4.2–5.4)
TROPONIN I SERPL HS-MCNC: 15 NG/L (ref 0–13)
WBC OTHER # BLD: 9 K/UL (ref 4–10.5)

## 2025-01-01 PROCEDURE — 6360000002 HC RX W HCPCS: Performed by: EMERGENCY MEDICINE

## 2025-01-01 PROCEDURE — 83690 ASSAY OF LIPASE: CPT

## 2025-01-01 PROCEDURE — 83880 ASSAY OF NATRIURETIC PEPTIDE: CPT

## 2025-01-01 PROCEDURE — 99285 EMERGENCY DEPT VISIT HI MDM: CPT

## 2025-01-01 PROCEDURE — 80053 COMPREHEN METABOLIC PANEL: CPT

## 2025-01-01 PROCEDURE — 85027 COMPLETE CBC AUTOMATED: CPT

## 2025-01-01 PROCEDURE — 93005 ELECTROCARDIOGRAM TRACING: CPT | Performed by: EMERGENCY MEDICINE

## 2025-01-01 PROCEDURE — 2580000003 HC RX 258: Performed by: EMERGENCY MEDICINE

## 2025-01-01 PROCEDURE — 2500000003 HC RX 250 WO HCPCS: Performed by: EMERGENCY MEDICINE

## 2025-01-01 PROCEDURE — 83735 ASSAY OF MAGNESIUM: CPT

## 2025-01-01 PROCEDURE — 84484 ASSAY OF TROPONIN QUANT: CPT

## 2025-01-01 RX ORDER — MAGNESIUM SULFATE IN WATER 40 MG/ML
2000 INJECTION, SOLUTION INTRAVENOUS ONCE
Status: COMPLETED | OUTPATIENT
Start: 2025-01-01 | End: 2025-01-02

## 2025-01-01 RX ORDER — AMIODARONE HYDROCHLORIDE 150 MG/3ML
150 INJECTION, SOLUTION INTRAVENOUS ONCE
Status: COMPLETED | OUTPATIENT
Start: 2025-01-01 | End: 2025-01-01

## 2025-01-01 RX ORDER — 0.9 % SODIUM CHLORIDE 0.9 %
1000 INTRAVENOUS SOLUTION INTRAVENOUS ONCE
Status: COMPLETED | OUTPATIENT
Start: 2025-01-02 | End: 2025-01-02

## 2025-01-01 RX ORDER — DILTIAZEM HYDROCHLORIDE 5 MG/ML
10 INJECTION INTRAVENOUS ONCE
Status: COMPLETED | OUTPATIENT
Start: 2025-01-01 | End: 2025-01-01

## 2025-01-01 RX ADMIN — DILTIAZEM HYDROCHLORIDE 10 MG: 5 INJECTION, SOLUTION INTRAVENOUS at 23:44

## 2025-01-01 RX ADMIN — AMIODARONE HYDROCHLORIDE 150 MG: 50 INJECTION, SOLUTION INTRAVENOUS at 23:43

## 2025-01-01 RX ADMIN — SODIUM CHLORIDE 5 MG/HR: 900 INJECTION, SOLUTION INTRAVENOUS at 23:48

## 2025-01-01 RX ADMIN — MAGNESIUM SULFATE HEPTAHYDRATE 2000 MG: 40 INJECTION, SOLUTION INTRAVENOUS at 23:49

## 2025-01-01 RX ADMIN — SODIUM CHLORIDE 1000 ML: 9 INJECTION, SOLUTION INTRAVENOUS at 23:56

## 2025-01-01 ASSESSMENT — PAIN - FUNCTIONAL ASSESSMENT: PAIN_FUNCTIONAL_ASSESSMENT: NONE - DENIES PAIN

## 2025-01-01 ASSESSMENT — LIFESTYLE VARIABLES
HOW MANY STANDARD DRINKS CONTAINING ALCOHOL DO YOU HAVE ON A TYPICAL DAY: PATIENT DOES NOT DRINK
HOW OFTEN DO YOU HAVE A DRINK CONTAINING ALCOHOL: NEVER

## 2025-01-02 ENCOUNTER — APPOINTMENT (OUTPATIENT)
Dept: NON INVASIVE DIAGNOSTICS | Age: 60
DRG: 309 | End: 2025-01-02
Attending: INTERNAL MEDICINE
Payer: COMMERCIAL

## 2025-01-02 ENCOUNTER — APPOINTMENT (OUTPATIENT)
Dept: GENERAL RADIOLOGY | Age: 60
DRG: 309 | End: 2025-01-02
Payer: COMMERCIAL

## 2025-01-02 PROBLEM — I47.20 V TACH (HCC): Status: ACTIVE | Noted: 2025-01-02

## 2025-01-02 PROBLEM — I47.20 VENTRICULAR TACHYCARDIA (HCC): Status: ACTIVE | Noted: 2025-01-02

## 2025-01-02 LAB
ALBUMIN SERPL-MCNC: 4.6 G/DL (ref 3.4–5)
ALBUMIN/GLOB SERPL: 1.6 {RATIO} (ref 1.1–2.2)
ALP SERPL-CCNC: 45 U/L (ref 40–129)
ALT SERPL-CCNC: 26 U/L (ref 10–40)
ANION GAP SERPL CALCULATED.3IONS-SCNC: 15 MMOL/L (ref 4–16)
ANION GAP SERPL CALCULATED.3IONS-SCNC: 9 MMOL/L (ref 9–17)
AST SERPL-CCNC: 29 U/L (ref 15–37)
BILIRUB SERPL-MCNC: 0.4 MG/DL (ref 0–1)
BNP SERPL-MCNC: 678 PG/ML (ref 0–125)
BUN SERPL-MCNC: 20 MG/DL (ref 7–20)
BUN SERPL-MCNC: 24 MG/DL (ref 6–23)
CALCIUM SERPL-MCNC: 9.2 MG/DL (ref 8.3–10.6)
CALCIUM SERPL-MCNC: 9.7 MG/DL (ref 8.3–10.6)
CHLORIDE SERPL-SCNC: 101 MMOL/L (ref 99–110)
CHLORIDE SERPL-SCNC: 102 MMOL/L (ref 99–110)
CO2 SERPL-SCNC: 26 MMOL/L (ref 21–32)
CO2 SERPL-SCNC: 27 MMOL/L (ref 21–32)
CREAT SERPL-MCNC: 1 MG/DL (ref 0.6–1.2)
CREAT SERPL-MCNC: 1.4 MG/DL (ref 0.6–1.1)
ECHO AO ROOT DIAM: 2.6 CM
ECHO AO ROOT INDEX: 1.14 CM/M2
ECHO AV MEAN GRADIENT: 10 MMHG
ECHO AV MEAN VELOCITY: 1.5 M/S
ECHO AV PEAK GRADIENT: 15 MMHG
ECHO AV PEAK VELOCITY: 1.9 M/S
ECHO AV VTI: 38.8 CM
ECHO BSA: 2.44 M2
ECHO EST RA PRESSURE: 3 MMHG
ECHO IVC PROX: 1.2 CM
ECHO LA DIAMETER INDEX: 1.7 CM/M2
ECHO LA DIAMETER: 3.9 CM
ECHO LA TO AORTIC ROOT RATIO: 1.5
ECHO LV EDV A4C: 110 ML
ECHO LV EDV INDEX A4C: 48 ML/M2
ECHO LV EF PHYSICIAN: 55 %
ECHO LV EJECTION FRACTION A4C: 63 %
ECHO LV ESV A4C: 41 ML
ECHO LV ESV INDEX A4C: 18 ML/M2
ECHO LV FRACTIONAL SHORTENING: 31 % (ref 28–44)
ECHO LV INTERNAL DIMENSION DIASTOLE INDEX: 1.53 CM/M2
ECHO LV INTERNAL DIMENSION DIASTOLIC: 3.5 CM (ref 3.9–5.3)
ECHO LV INTERNAL DIMENSION SYSTOLIC INDEX: 1.05 CM/M2
ECHO LV INTERNAL DIMENSION SYSTOLIC: 2.4 CM
ECHO LV IVSD: 1.9 CM (ref 0.6–0.9)
ECHO LV MASS 2D: 262.7 G (ref 67–162)
ECHO LV MASS INDEX 2D: 114.7 G/M2 (ref 43–95)
ECHO LV POSTERIOR WALL DIASTOLIC: 1.7 CM (ref 0.6–0.9)
ECHO LV RELATIVE WALL THICKNESS RATIO: 0.97
ECHO LVOT AREA: 3.1 CM2
ECHO LVOT DIAM: 2 CM
ECHO RIGHT VENTRICULAR SYSTOLIC PRESSURE (RVSP): 14 MMHG
ECHO TV REGURGITANT MAX VELOCITY: 1.68 M/S
ECHO TV REGURGITANT PEAK GRADIENT: 11 MMHG
ERYTHROCYTE [DISTWIDTH] IN BLOOD BY AUTOMATED COUNT: 13 % (ref 11.7–14.9)
GFR, ESTIMATED: 43 ML/MIN/1.73M2
GFR, ESTIMATED: 55 ML/MIN/1.73M2
GLUCOSE SERPL-MCNC: 100 MG/DL (ref 74–99)
GLUCOSE SERPL-MCNC: 146 MG/DL (ref 70–99)
HCT VFR BLD AUTO: 42.1 % (ref 37–47)
HGB BLD-MCNC: 14.4 G/DL (ref 12.5–16)
LIPASE SERPL-CCNC: 30 U/L (ref 13–60)
MAGNESIUM SERPL-MCNC: 2.4 MG/DL (ref 1.8–2.4)
MAGNESIUM SERPL-MCNC: 2.8 MG/DL (ref 1.8–2.4)
MCH RBC QN AUTO: 29.8 PG (ref 27–31)
MCHC RBC AUTO-ENTMCNC: 34.2 G/DL (ref 32–36)
MCV RBC AUTO: 87.2 FL (ref 78–100)
PLATELET # BLD AUTO: 259 K/UL (ref 140–440)
PMV BLD AUTO: 9.3 FL (ref 7.5–11.1)
POTASSIUM SERPL-SCNC: 3.6 MMOL/L (ref 3.5–5.1)
POTASSIUM SERPL-SCNC: 4.1 MMOL/L (ref 3.5–5.1)
PROT SERPL-MCNC: 7.5 G/DL (ref 6.4–8.2)
RBC # BLD AUTO: 4.83 M/UL (ref 4.2–5.4)
SODIUM SERPL-SCNC: 138 MMOL/L (ref 136–145)
SODIUM SERPL-SCNC: 142 MMOL/L (ref 135–145)
T4 FREE SERPL-MCNC: 1.1 NG/DL (ref 0.9–1.8)
TROPONIN I SERPL HS-MCNC: 14 NG/L (ref 0–13)
TSH SERPL DL<=0.05 MIU/L-ACNC: 4.48 UIU/ML (ref 0.27–4.2)
WBC OTHER # BLD: 5.7 K/UL (ref 4–10.5)

## 2025-01-02 PROCEDURE — 84443 ASSAY THYROID STIM HORMONE: CPT

## 2025-01-02 PROCEDURE — 93308 TTE F-UP OR LMTD: CPT

## 2025-01-02 PROCEDURE — 93325 DOPPLER ECHO COLOR FLOW MAPG: CPT | Performed by: INTERNAL MEDICINE

## 2025-01-02 PROCEDURE — 83735 ASSAY OF MAGNESIUM: CPT

## 2025-01-02 PROCEDURE — 2500000003 HC RX 250 WO HCPCS: Performed by: INTERNAL MEDICINE

## 2025-01-02 PROCEDURE — 2500000003 HC RX 250 WO HCPCS: Performed by: EMERGENCY MEDICINE

## 2025-01-02 PROCEDURE — 93308 TTE F-UP OR LMTD: CPT | Performed by: INTERNAL MEDICINE

## 2025-01-02 PROCEDURE — 6360000002 HC RX W HCPCS: Performed by: EMERGENCY MEDICINE

## 2025-01-02 PROCEDURE — 85027 COMPLETE CBC AUTOMATED: CPT

## 2025-01-02 PROCEDURE — 6370000000 HC RX 637 (ALT 250 FOR IP)

## 2025-01-02 PROCEDURE — 2580000003 HC RX 258: Performed by: EMERGENCY MEDICINE

## 2025-01-02 PROCEDURE — 84439 ASSAY OF FREE THYROXINE: CPT

## 2025-01-02 PROCEDURE — 84484 ASSAY OF TROPONIN QUANT: CPT

## 2025-01-02 PROCEDURE — 2580000003 HC RX 258: Performed by: INTERNAL MEDICINE

## 2025-01-02 PROCEDURE — 5A09357 ASSISTANCE WITH RESPIRATORY VENTILATION, LESS THAN 24 CONSECUTIVE HOURS, CONTINUOUS POSITIVE AIRWAY PRESSURE: ICD-10-PCS | Performed by: STUDENT IN AN ORGANIZED HEALTH CARE EDUCATION/TRAINING PROGRAM

## 2025-01-02 PROCEDURE — 2060000000 HC ICU INTERMEDIATE R&B

## 2025-01-02 PROCEDURE — 71045 X-RAY EXAM CHEST 1 VIEW: CPT

## 2025-01-02 PROCEDURE — 94761 N-INVAS EAR/PLS OXIMETRY MLT: CPT

## 2025-01-02 PROCEDURE — 6370000000 HC RX 637 (ALT 250 FOR IP): Performed by: INTERNAL MEDICINE

## 2025-01-02 PROCEDURE — 93321 DOPPLER ECHO F-UP/LMTD STD: CPT | Performed by: INTERNAL MEDICINE

## 2025-01-02 PROCEDURE — 80048 BASIC METABOLIC PNL TOTAL CA: CPT

## 2025-01-02 RX ORDER — METOPROLOL TARTRATE 25 MG/1
25 TABLET, FILM COATED ORAL 2 TIMES DAILY
Status: DISCONTINUED | OUTPATIENT
Start: 2025-01-02 | End: 2025-01-02

## 2025-01-02 RX ORDER — MAGNESIUM SULFATE IN WATER 40 MG/ML
2000 INJECTION, SOLUTION INTRAVENOUS PRN
Status: DISCONTINUED | OUTPATIENT
Start: 2025-01-02 | End: 2025-01-03 | Stop reason: HOSPADM

## 2025-01-02 RX ORDER — POTASSIUM CHLORIDE 29.8 MG/ML
20 INJECTION INTRAVENOUS PRN
Status: DISCONTINUED | OUTPATIENT
Start: 2025-01-02 | End: 2025-01-03 | Stop reason: HOSPADM

## 2025-01-02 RX ORDER — FUROSEMIDE 40 MG/1
40 TABLET ORAL 2 TIMES DAILY
Status: DISCONTINUED | OUTPATIENT
Start: 2025-01-02 | End: 2025-01-03 | Stop reason: HOSPADM

## 2025-01-02 RX ORDER — SODIUM CHLORIDE 9 MG/ML
INJECTION, SOLUTION INTRAVENOUS CONTINUOUS
Status: DISCONTINUED | OUTPATIENT
Start: 2025-01-02 | End: 2025-01-03 | Stop reason: HOSPADM

## 2025-01-02 RX ORDER — METOPROLOL TARTRATE 1 MG/ML
2.5 INJECTION, SOLUTION INTRAVENOUS ONCE
Status: COMPLETED | OUTPATIENT
Start: 2025-01-02 | End: 2025-01-02

## 2025-01-02 RX ORDER — AMLODIPINE BESYLATE 5 MG/1
5 TABLET ORAL DAILY
Status: DISCONTINUED | OUTPATIENT
Start: 2025-01-02 | End: 2025-01-03 | Stop reason: HOSPADM

## 2025-01-02 RX ORDER — POLYETHYLENE GLYCOL 3350 17 G/17G
17 POWDER, FOR SOLUTION ORAL DAILY PRN
Status: DISCONTINUED | OUTPATIENT
Start: 2025-01-02 | End: 2025-01-03 | Stop reason: HOSPADM

## 2025-01-02 RX ORDER — SODIUM CHLORIDE 0.9 % (FLUSH) 0.9 %
5-40 SYRINGE (ML) INJECTION PRN
Status: DISCONTINUED | OUTPATIENT
Start: 2025-01-02 | End: 2025-01-03 | Stop reason: HOSPADM

## 2025-01-02 RX ORDER — SENNA AND DOCUSATE SODIUM 50; 8.6 MG/1; MG/1
1 TABLET, FILM COATED ORAL 2 TIMES DAILY
Status: DISCONTINUED | OUTPATIENT
Start: 2025-01-02 | End: 2025-01-03 | Stop reason: HOSPADM

## 2025-01-02 RX ORDER — METOPROLOL TARTRATE 25 MG/1
12.5 TABLET, FILM COATED ORAL 2 TIMES DAILY
Status: DISCONTINUED | OUTPATIENT
Start: 2025-01-02 | End: 2025-01-03

## 2025-01-02 RX ORDER — ACETAMINOPHEN 650 MG/1
650 SUPPOSITORY RECTAL EVERY 6 HOURS PRN
Status: DISCONTINUED | OUTPATIENT
Start: 2025-01-02 | End: 2025-01-03 | Stop reason: HOSPADM

## 2025-01-02 RX ORDER — SODIUM CHLORIDE 9 MG/ML
INJECTION, SOLUTION INTRAVENOUS PRN
Status: DISCONTINUED | OUTPATIENT
Start: 2025-01-02 | End: 2025-01-03 | Stop reason: HOSPADM

## 2025-01-02 RX ORDER — ENOXAPARIN SODIUM 100 MG/ML
30 INJECTION SUBCUTANEOUS 2 TIMES DAILY
Status: DISCONTINUED | OUTPATIENT
Start: 2025-01-02 | End: 2025-01-02

## 2025-01-02 RX ORDER — ONDANSETRON 4 MG/1
4 TABLET, ORALLY DISINTEGRATING ORAL EVERY 8 HOURS PRN
Status: DISCONTINUED | OUTPATIENT
Start: 2025-01-02 | End: 2025-01-03 | Stop reason: HOSPADM

## 2025-01-02 RX ORDER — ONDANSETRON 2 MG/ML
4 INJECTION INTRAMUSCULAR; INTRAVENOUS EVERY 6 HOURS PRN
Status: DISCONTINUED | OUTPATIENT
Start: 2025-01-02 | End: 2025-01-03 | Stop reason: HOSPADM

## 2025-01-02 RX ORDER — FENOFIBRATE 160 MG/1
160 TABLET ORAL DAILY
Status: DISCONTINUED | OUTPATIENT
Start: 2025-01-02 | End: 2025-01-03 | Stop reason: HOSPADM

## 2025-01-02 RX ORDER — SODIUM CHLORIDE 0.9 % (FLUSH) 0.9 %
5-40 SYRINGE (ML) INJECTION EVERY 12 HOURS SCHEDULED
Status: DISCONTINUED | OUTPATIENT
Start: 2025-01-02 | End: 2025-01-03 | Stop reason: HOSPADM

## 2025-01-02 RX ORDER — ACETAMINOPHEN 325 MG/1
650 TABLET ORAL EVERY 4 HOURS PRN
Status: DISCONTINUED | OUTPATIENT
Start: 2025-01-02 | End: 2025-01-03 | Stop reason: HOSPADM

## 2025-01-02 RX ORDER — POTASSIUM CHLORIDE 7.45 MG/ML
10 INJECTION INTRAVENOUS PRN
Status: DISCONTINUED | OUTPATIENT
Start: 2025-01-02 | End: 2025-01-03 | Stop reason: HOSPADM

## 2025-01-02 RX ADMIN — METOPROLOL TARTRATE 12.5 MG: 25 TABLET, FILM COATED ORAL at 20:41

## 2025-01-02 RX ADMIN — RIVAROXABAN 20 MG: 20 TABLET, FILM COATED ORAL at 09:30

## 2025-01-02 RX ADMIN — AMIODARONE HYDROCHLORIDE 1 MG/MIN: 50 INJECTION, SOLUTION INTRAVENOUS at 00:48

## 2025-01-02 RX ADMIN — SODIUM CHLORIDE: 9 INJECTION, SOLUTION INTRAVENOUS at 03:10

## 2025-01-02 RX ADMIN — SODIUM CHLORIDE, PRESERVATIVE FREE 10 ML: 5 INJECTION INTRAVENOUS at 09:33

## 2025-01-02 RX ADMIN — METOPROLOL TARTRATE 2.5 MG: 1 INJECTION, SOLUTION INTRAVENOUS at 00:42

## 2025-01-02 RX ADMIN — SODIUM CHLORIDE, PRESERVATIVE FREE 10 ML: 5 INJECTION INTRAVENOUS at 20:41

## 2025-01-02 RX ADMIN — AMIODARONE HYDROCHLORIDE 0.5 MG/MIN: 50 INJECTION, SOLUTION INTRAVENOUS at 23:01

## 2025-01-02 RX ADMIN — AMIODARONE HYDROCHLORIDE 0.5 MG/MIN: 50 INJECTION, SOLUTION INTRAVENOUS at 09:54

## 2025-01-02 RX ADMIN — AMLODIPINE BESYLATE 5 MG: 5 TABLET ORAL at 17:49

## 2025-01-02 RX ADMIN — FENOFIBRATE 160 MG: 160 TABLET ORAL at 09:32

## 2025-01-02 ASSESSMENT — PAIN SCALES - GENERAL: PAINLEVEL_OUTOF10: 0

## 2025-01-02 ASSESSMENT — HEART SCORE: ECG: NON-SPECIFC REPOLARIZATION DISTURBANCE/LBTB/PM

## 2025-01-02 NOTE — CONSULTS
CARDIOLOGY CONSULT NOTE         Reason for consultation: Atrial fibrillation with rapid ventricular rate      Primary care physician: Kam Irizarry MD      Chief Complaints :  Chief Complaint   Patient presents with    Atrial Fibrillation     Pt has h/o afib and is states \"I am in AFIB\"        History of present illness:Janay is a 59 y.o.year old female who has prior history of paroxysmal atrial fibrillation as well as ablation in 2023, sleep apnea, hypertension, hypothyroidism.  She is now admitted with heart racing.  In the emergency room she was started on amiodarone and diltiazem drip because her heart rate was in 160s with  left bundle aberrancy.  Overnight patient converted to sinus rhythm.  Currently she feels better.  He denies any chest pain.  She has been compliant with her medication including Multaq.    Review of Systems:     All systems negative except as marked.        Physical Examination:    Vitals:    01/02/25 1500   BP: (!) 145/89   Pulse: 53   Resp: 18   Temp:    SpO2: 94%        General Appearance:  No distress, conversant    Constitutional:  No acute distress, non-toxic appearance.    HENT:  Normocephalic, Atraumatic,   Eyes:  PERRL, EOMI, Conjunctiva normal, No discharge.   Respiratory:  No respiratory distress, No wheezing  Cardiovascular: S1, S2, no murmurs, gallops. JVD wnl  Abdomen /GI:   Soft, No tenderness   Genitourinary: No costovertebral angle tenderness   Musculoskeletal:  No edema, no tenderness, no deformities.   Integument:  Well hydrated, no rash   Neurologic:  Alert & oriented x 3, no focal deficits noted       Medical decision making and Data review:    Lab Review   Recent Labs     01/01/25  2325   WBC 9.0   HGB 16.8*   HCT 48.2*         Recent Labs     01/01/25  2325      K 4.1      CO2 26   BUN 24*   CREATININE 1.4*     Recent Labs     01/01/25  2325   AST 29   ALT 26   BILITOT 0.4   ALKPHOS 45     No results for input(s): \"TROPONINT\" in the last 72 hours.

## 2025-01-02 NOTE — ED PROVIDER NOTES
New Bag 1/1/25 2356)   amiodarone (CORDARONE) 450 mg in dextrose 5 % 250 mL infusion ([id number]) (has no administration in time range)     Followed by   amiodarone (CORDARONE) 450 mg in dextrose 5 % 250 mL infusion ([id number]) (has no administration in time range)   dilTIAZem injection 10 mg (10 mg IntraVENous Given 1/1/25 2344)   amiodarone (CORDARONE) injection 150 mg (150 mg IntraVENous Given 1/1/25 2343)   metoprolol (LOPRESSOR) injection 2.5 mg (2.5 mg IntraVENous Given 1/2/25 0042)       Independent Imaging Interpretation by me: Chest x-ray per my interpretation no obvious infiltrates or effusions.    EKG (if obtained): (All EKG's are interpreted by myself in the absence of a cardiologist)     Chronic conditions affecting care: obesity, atrial fibrillation    Social Determinants : None    Records Reviewed : Outpatient Notes patient was seen in the cardiologist office November 6, 2024      Disposition Considerations (tests considered but not done, Shared Decision Making, Pt Expectation of Test or Tx.):            Discussion with Other Profesionals : Admitting Team ICU Dr. Clemons and Consultant Cardiologist Dr. Clemons      I am the Primary Clinician of Record.    Is this patient to be included in the SEP-1 Core Measure due to severe sepsis or septic shock?   No   Exclusion criteria - the patient is NOT to be included for SEP-1 Core Measure due to:  Infection is not suspected        Clinical Impression:  1. Atrial fibrillation with RVR (HCC)    2. Non-sustained ventricular tachycardia (HCC)    3. Chest pain, unspecified type    4. Essential hypertension    5. Elevated troponin    6. Acute renal insufficiency          ED Provider Disposition Time  DISPOSITION Decision To Admit 01/02/2025 12:06:44 AM   DISPOSITION CONDITION Stable     CRITICAL CARE NOTE:  There was a high probability of clinically significant life-threatening deterioration of the patient's condition requiring my urgent intervention due to atrial

## 2025-01-02 NOTE — CARE COORDINATION
SW introduced self to the pt and her daughter, Hiral. SW explained CM role. Pt admitted for Afib with RVR on 1/2. Pt lives in home with her spouse. Pt works part-time. Both drive. Pt has PCP and denies needs for assistance with d/c. Plan home.    01/02/25 0921   Service Assessment   Patient Orientation Alert and Oriented   Cognition Alert   History Provided By Patient;Medical Record   Primary Caregiver Self   Accompanied By/Relationship Child, Hiral   Support Systems Spouse/Significant Other;Children   Patient's Healthcare Decision Maker is: Legal Next of Kin   PCP Verified by CM Yes   Last Visit to PCP Within last 3 months   Prior Functional Level Independent in ADLs/IADLs   Current Functional Level Independent in ADLs/IADLs   Can patient return to prior living arrangement Yes   Ability to make needs known: Good   Family able to assist with home care needs: Yes   Would you like for me to discuss the discharge plan with any other family members/significant others, and if so, who? No   Financial Resources None   Community Resources None   CM/SW Referral Other (see comment)  (N/A)

## 2025-01-02 NOTE — H&P
History & Physical- Critical Care      DATE: 25  NAME: Janay Benavides  : 1965  MRN: [id number]    Chief Complaint:   Chief Complaint   Patient presents with    Atrial Fibrillation     Pt has h/o afib and is states \"I am in AFIB\"        History of Present Illness:  Janay Benavides is a 59 y.o. female  patient presented to the emergency room for evaluation of palpitations.  Reported palpitations, no chest pain or shortness of breath, history of A-fib with RVR multiple ablations and cardioversions, follows closely with cardiology.  She had an episode of ? ventricular tachycardia while in the ED, her rhythms and EKG confirmed RVR, transfer requested to [facility] for further management, cardiology were consulted  In the ER the patient was given IV metoprolol, IV Cardizem bolus then a drip, IV amiodarone bolus then started on a drip.  IV magnesium    ROS:  Negative except as in HPI    Past Medical, Surgical, Social, Family History:  Past Medical History:   Diagnosis Date    Atrial fibrillation with RVR ([facility]) 2017    Bell palsy     right side of face affected    H/O 24 hour EKG monitoring 2010    holter: other then an episode of sinus tach at 150 bpm no other clinically significant arrhythmia seen.  Symptoms reported in pts diary do not correlate with this episode    H/O cardiovascular stress test 2010    Joe protocol: normal stress study. gated study shows uniform wall motion with calculated LV EF of over 67 %    H/O echocardiogram 2010    ECHO:  There is no comparison study available. There is mild concentric LV hypertrophy. LVSF is normal, EF = > 55 %    H/O echocardiogram 2014    EF55%, borderline left atrial enlargement, normal LV systolic function, mild mitral regurg, no pericardial effusion.    H/O echocardiogram 2017    EF55%  AV mild  sclerotic    H/O echocardiogram 2018    EF55-60% sclerotic non stenosed AV    H/O transesophageal

## 2025-01-02 NOTE — ED NOTES
Magnesium was increased  from 25mg/hr to 100mg/hr per Dr Wagner due to MICU not having enough channels on their pump for transport.

## 2025-01-02 NOTE — FLOWSHEET NOTE
4 Eyes Skin Assessment     NAME:  Janay Benavides  YOB: 1965  MEDICAL RECORD NUMBER:  [id number]    The patient is being assessed for  Admission    I agree that at least one RN has performed a thorough Head to Toe Skin Assessment on the patient. ALL assessment sites listed below have been assessed.      Areas assessed by both nurses:    Head, Face, Ears, Shoulders, Back, Chest, Arms, Elbows, Hands, Sacrum. Buttock, Coccyx, Ischium, and Legs. Feet and Heels        Does the Patient have a Wound? No noted wound(s)       Owen Prevention initiated by RN: No  Wound Care Orders initiated by RN: No    Pressure Injury (Stage 3,4, Unstageable, DTI, NWPT, and Complex wounds) if present, place Wound referral order by RN under : No    New Ostomies, if present place, Ostomy referral order under : No     Nurse 1 eSignature: Electronically signed by Ebony Amaro RN on 1/2/25 at 3:38 AM EST    **SHARE this note so that the co-signing nurse can place an eSignature**    Nurse 2 eSignature: Electronically signed by Tasia Del Toro RN on 1/2/25 at 3:43 AM EST

## 2025-01-03 VITALS
HEIGHT: 64 IN | DIASTOLIC BLOOD PRESSURE: 72 MMHG | HEART RATE: 54 BPM | WEIGHT: 290.79 LBS | RESPIRATION RATE: 16 BRPM | BODY MASS INDEX: 49.64 KG/M2 | TEMPERATURE: 98 F | OXYGEN SATURATION: 98 % | SYSTOLIC BLOOD PRESSURE: 151 MMHG

## 2025-01-03 LAB
ALBUMIN SERPL-MCNC: 3.5 G/DL (ref 3.4–5)
ALBUMIN/GLOB SERPL: 1.5 {RATIO} (ref 1.1–2.2)
ALP SERPL-CCNC: 32 U/L (ref 40–129)
ALT SERPL-CCNC: 14 U/L (ref 10–40)
ANION GAP SERPL CALCULATED.3IONS-SCNC: 10 MMOL/L (ref 9–17)
AST SERPL-CCNC: 21 U/L (ref 15–37)
BASOPHILS # BLD: 0.03 K/UL
BASOPHILS NFR BLD: 1 % (ref 0–1)
BILIRUB SERPL-MCNC: 0.5 MG/DL (ref 0–1)
BUN SERPL-MCNC: 21 MG/DL (ref 7–20)
CALCIUM SERPL-MCNC: 8.9 MG/DL (ref 8.3–10.6)
CHLORIDE SERPL-SCNC: 103 MMOL/L (ref 99–110)
CO2 SERPL-SCNC: 26 MMOL/L (ref 21–32)
CREAT SERPL-MCNC: 1 MG/DL (ref 0.6–1.2)
EKG ATRIAL RATE: 133 BPM
EKG ATRIAL RATE: 156 BPM
EKG DIAGNOSIS: NORMAL
EKG DIAGNOSIS: NORMAL
EKG Q-T INTERVAL: 354 MS
EKG Q-T INTERVAL: 358 MS
EKG QRS DURATION: 110 MS
EKG QRS DURATION: 112 MS
EKG QTC CALCULATION (BAZETT): 520 MS
EKG QTC CALCULATION (BAZETT): 546 MS
EKG R AXIS: 19 DEGREES
EKG R AXIS: 24 DEGREES
EKG T AXIS: 155 DEGREES
EKG T AXIS: 176 DEGREES
EKG VENTRICULAR RATE: 127 BPM
EKG VENTRICULAR RATE: 143 BPM
EOSINOPHIL # BLD: 0.08 K/UL
EOSINOPHILS RELATIVE PERCENT: 2 % (ref 0–3)
ERYTHROCYTE [DISTWIDTH] IN BLOOD BY AUTOMATED COUNT: 13.1 % (ref 11.7–14.9)
GFR, ESTIMATED: 58 ML/MIN/1.73M2
GLUCOSE SERPL-MCNC: 112 MG/DL (ref 74–99)
HCT VFR BLD AUTO: 40.3 % (ref 37–47)
HGB BLD-MCNC: 13.6 G/DL (ref 12.5–16)
IMM GRANULOCYTES # BLD AUTO: 0.02 K/UL
IMM GRANULOCYTES NFR BLD: 0 %
LYMPHOCYTES NFR BLD: 2.6 K/UL
LYMPHOCYTES RELATIVE PERCENT: 49 % (ref 24–44)
MCH RBC QN AUTO: 29.4 PG (ref 27–31)
MCHC RBC AUTO-ENTMCNC: 33.7 G/DL (ref 32–36)
MCV RBC AUTO: 87.2 FL (ref 78–100)
MONOCYTES NFR BLD: 0.52 K/UL
MONOCYTES NFR BLD: 10 % (ref 0–4)
NEUTROPHILS NFR BLD: 39 % (ref 36–66)
NEUTS SEG NFR BLD: 2.11 K/UL
PHOSPHATE SERPL-MCNC: 3.2 MG/DL (ref 2.5–4.9)
PLATELET # BLD AUTO: 240 K/UL (ref 140–440)
PMV BLD AUTO: 9.3 FL (ref 7.5–11.1)
POTASSIUM SERPL-SCNC: 3.6 MMOL/L (ref 3.5–5.1)
PROT SERPL-MCNC: 5.9 G/DL (ref 6.4–8.2)
RBC # BLD AUTO: 4.62 M/UL (ref 4.2–5.4)
SODIUM SERPL-SCNC: 139 MMOL/L (ref 136–145)
TROPONIN I SERPL HS-MCNC: 15 NG/L (ref 0–14)
WBC OTHER # BLD: 5.4 K/UL (ref 4–10.5)

## 2025-01-03 PROCEDURE — 6370000000 HC RX 637 (ALT 250 FOR IP): Performed by: INTERNAL MEDICINE

## 2025-01-03 PROCEDURE — 36415 COLL VENOUS BLD VENIPUNCTURE: CPT

## 2025-01-03 PROCEDURE — 6370000000 HC RX 637 (ALT 250 FOR IP)

## 2025-01-03 PROCEDURE — 85025 COMPLETE CBC W/AUTO DIFF WBC: CPT

## 2025-01-03 PROCEDURE — 84484 ASSAY OF TROPONIN QUANT: CPT

## 2025-01-03 PROCEDURE — 2500000003 HC RX 250 WO HCPCS: Performed by: INTERNAL MEDICINE

## 2025-01-03 PROCEDURE — 84100 ASSAY OF PHOSPHORUS: CPT

## 2025-01-03 PROCEDURE — 93010 ELECTROCARDIOGRAM REPORT: CPT | Performed by: INTERNAL MEDICINE

## 2025-01-03 PROCEDURE — 80053 COMPREHEN METABOLIC PANEL: CPT

## 2025-01-03 PROCEDURE — 94761 N-INVAS EAR/PLS OXIMETRY MLT: CPT

## 2025-01-03 RX ORDER — AMIODARONE HYDROCHLORIDE 400 MG/1
400 TABLET ORAL DAILY
Qty: 30 TABLET | Refills: 0 | Status: CANCELLED | OUTPATIENT
Start: 2025-01-04 | End: 2025-02-03

## 2025-01-03 RX ORDER — AMIODARONE HYDROCHLORIDE 200 MG/1
TABLET ORAL
Qty: 44 TABLET | Refills: 0 | Status: SHIPPED | OUTPATIENT
Start: 2025-01-04 | End: 2025-02-10

## 2025-01-03 RX ORDER — AMLODIPINE BESYLATE 5 MG/1
5 TABLET ORAL DAILY
Qty: 30 TABLET | Refills: 3 | Status: SHIPPED | OUTPATIENT
Start: 2025-01-04

## 2025-01-03 RX ORDER — AMIODARONE HYDROCHLORIDE 200 MG/1
400 TABLET ORAL DAILY
Status: DISCONTINUED | OUTPATIENT
Start: 2025-01-04 | End: 2025-01-03 | Stop reason: HOSPADM

## 2025-01-03 RX ORDER — AMIODARONE HYDROCHLORIDE 200 MG/1
400 TABLET ORAL DAILY
Status: DISCONTINUED | OUTPATIENT
Start: 2025-01-03 | End: 2025-01-03

## 2025-01-03 RX ADMIN — FENOFIBRATE 160 MG: 160 TABLET ORAL at 08:00

## 2025-01-03 RX ADMIN — SODIUM CHLORIDE, PRESERVATIVE FREE 10 ML: 5 INJECTION INTRAVENOUS at 08:01

## 2025-01-03 RX ADMIN — AMLODIPINE BESYLATE 5 MG: 5 TABLET ORAL at 08:00

## 2025-01-03 RX ADMIN — RIVAROXABAN 20 MG: 20 TABLET, FILM COATED ORAL at 08:00

## 2025-01-03 RX ADMIN — AMIODARONE HYDROCHLORIDE 400 MG: 200 TABLET ORAL at 10:20

## 2025-01-03 ASSESSMENT — PAIN SCALES - GENERAL: PAINLEVEL_OUTOF10: 0

## 2025-01-03 NOTE — PLAN OF CARE
Problem: Discharge Planning  Goal: Discharge to home or other facility with appropriate resources  1/3/2025 0016 by Sheba Cole RN  Outcome: Progressing  1/2/2025 1727 by Lucrecia Boogie RN  Outcome: Progressing     Problem: ABCDS Injury Assessment  Goal: Absence of physical injury  1/3/2025 0016 by Sheba Cole RN  Outcome: Progressing  1/2/2025 1727 by Lucrecia Boogie RN  Outcome: Progressing     Problem: Cardiovascular - Adult  Goal: Maintains optimal cardiac output and hemodynamic stability  Outcome: Progressing  Goal: Absence of cardiac dysrhythmias or at baseline  Outcome: Progressing     Problem: Metabolic/Fluid and Electrolytes - Adult  Goal: Electrolytes maintained within normal limits  Outcome: Progressing  Goal: Hemodynamic stability and optimal renal function maintained  Outcome: Progressing  Goal: Glucose maintained within prescribed range  Outcome: Progressing

## 2025-01-03 NOTE — PROGRESS NOTES
Inpatient Progress Note 1/2/2025        Janay Benavides  1965  [id number]      Assessment/Plan:  59yoF with PMH of Afib s/p ablation and cardioversion HTN, HLD, Hypothyroidism, obesity, MARLINE on CPAP who presented with Afib RVR admitted to the ICU for monitoring      Problem list  Afib RVR  MARLINE  Obesity  HTN  HLD      Neuro: Alert, oriented, no acute issues.  Pain control with multimodal regimen, adjust as needed  Cardio: Presented with A-fib and RVR, started on amiodarone drip, NSR this morning.  Cardiology consulted, recs appreciated.  Known to have a significant history of A-fib with RVR status post ablation, multiple cardioversion, recent left heart cath that was reported to be negative.  Echo with normal with no wall motion abnormalities.  Resp: On room air, continue inhalers, nebs, aggressive pulmonary toileting.  MARLINE on CPAP  GI: Advance diet as tolerated, GI prophylaxis  : Creatinine of 1.4, monitor urine output, monitor electrolytes and replenish as needed.  Heme: Hemoglobin of 16.8, platelets of 353.  DVT prophylaxis with Lovenox  ID: WBC of 9, noninfectious, monitor clinically.  Endo: POC BG ISS as needed, maintain euglycemia, avoid hypoglycemia  MSK: DTI prevention    Tubes/Lines/Drains: PIV    Code Status: Full      No further critical care needs. Transfer to stepdown under hospitalitis service. Transfer communicated. Disposition at the discretion of the hospitalist.       Subjective:    Patient seen evaluated bedside, no acute events overnight, noted to be in A-fib with RVR on presentation, converted to normal sinus rhythm this morning.  Otherwise no complaints      Physical Exam:            /70   Pulse 58   Temp 97.7 °F (36.5 °C) (Oral)   Resp 18   Ht 1.626 m (5' 4\")   Wt 132.4 kg (291 lb 14.2 oz)   LMP 09/19/2018   SpO2 94%   BMI 50.10 kg/m²     General: NAD  Eyes: EOMI  ENT: neck supple  Cardiovascular: Regular rate.  Respiratory: Clear to auscultation  Gastrointestinal: 
Outpatient Pharmacy Progress Note for Meds-to-Beds    Total number of Prescriptions Filled: 1    Additional Documentation:  Patient picked-up the medication(s) in the OP Pharmacy      Thank you for letting us serve your patients.  [address] [address]    Phone: [phone]    Fax: [phone]        
found for: \"LABURIN\"  Blood Cultures: No results found for: \"BC\"  No results found for: \"BLOODCULT2\"  Organism: No results found for: \"ORG\"      Electronically signed by Angélica Pang MD on 1/2/2025 at 2:30 PM

## 2025-01-03 NOTE — DISCHARGE SUMMARY
V2.0  Discharge Summary    Name:  Janay Benavides /Age/Sex: 1965 (59 y.o. female)   Admit Date: 2025  Discharge Date: 1/3/25    MRN & CSN:  [id number] & [id number] Encounter Date and Time 1/3/25 2:16 PM EST    Attending:  Narda Garcia MD Discharging Provider: Narda Garcia MD       Hospital Course:     Brief HPI and Hospital course: Janay Benavides is a 59 y.o. female who  with past medical history of paroxysmal atrial fibrillation s/p ablation in , sleep apnea, hypertension, hypothyroidism presented with history of heart racing in the ED started on amiodarone and Cardizem drip and admitted for observation and consulted cardiology, overnight patient converted to sinus rhythm cardiology evaluated and recommended to discontinue amiodarone drip and switch to oral amiodarone and stopped Multaq completely and to follow-up with Dr. Nash EP as outpatient about her medication and recommended to continue Xarelto so discharged patient in a stable condition to follow-up with PCP and cardiology.        The patient expressed appropriate understanding of, and agreement with the discharge recommendations, medications, and plan.     Consults this admission:  IP CONSULT TO CARDIOLOGY    Discharge Diagnosis:   Atrial fibrillation with RVR (HCC)      Discharge Instruction:   Follow up appointments: PCP, cardiology and EP  Primary care physician: Kam Irizarry MD within 1 week  Diet: regular diet and cardiac diet   Activity: activity as tolerated  Disposition: Discharged to:   [x]Home, []C, []SNF, []Acute Rehab, []Hospice   Condition on discharge: Stable  Labs and Tests to be Followed up as an outpatient by PCP or Specialist:     Discharge Medications:        Medication List        START taking these medications      amiodarone 200 MG tablet  Commonly known as: CORDARONE  Take 2 tablets by mouth daily for 7 days, THEN 1 tablet daily.  Start taking on: 2025     amLODIPine 5 MG

## 2025-01-06 ENCOUNTER — TELEPHONE (OUTPATIENT)
Dept: CARDIOLOGY CLINIC | Age: 60
End: 2025-01-06

## 2025-01-06 NOTE — TELEPHONE ENCOUNTER
Called patient and f/u with manuel scheduled for 1/17/2025 @ 340. Patient has a months's supply of amiodarone and she can discuss with Manuel at her apt. Patient voiced understanding.

## 2025-01-06 NOTE — TELEPHONE ENCOUNTER
Pt was admitted on 1/2 to [facility] for Afib. She was given IV meds and sent home on medication but only for about a weeks worth. Needs FU   none

## 2025-01-17 ENCOUNTER — OFFICE VISIT (OUTPATIENT)
Age: 60
End: 2025-01-17
Payer: COMMERCIAL

## 2025-01-17 VITALS
HEIGHT: 64 IN | DIASTOLIC BLOOD PRESSURE: 80 MMHG | BODY MASS INDEX: 49.89 KG/M2 | WEIGHT: 292.2 LBS | SYSTOLIC BLOOD PRESSURE: 138 MMHG | HEART RATE: 67 BPM

## 2025-01-17 DIAGNOSIS — Z86.79 S/P ABLATION OF ATRIAL FIBRILLATION: ICD-10-CM

## 2025-01-17 DIAGNOSIS — I48.0 PAF (PAROXYSMAL ATRIAL FIBRILLATION) (HCC): Primary | ICD-10-CM

## 2025-01-17 DIAGNOSIS — E03.8 OTHER SPECIFIED HYPOTHYROIDISM: ICD-10-CM

## 2025-01-17 DIAGNOSIS — I10 PRIMARY HYPERTENSION: ICD-10-CM

## 2025-01-17 DIAGNOSIS — I48.0 HYPERCOAGULABLE STATE DUE TO PAROXYSMAL ATRIAL FIBRILLATION (HCC): ICD-10-CM

## 2025-01-17 DIAGNOSIS — D68.69 HYPERCOAGULABLE STATE DUE TO PAROXYSMAL ATRIAL FIBRILLATION (HCC): ICD-10-CM

## 2025-01-17 DIAGNOSIS — Z98.890 S/P ABLATION OF ATRIAL FIBRILLATION: ICD-10-CM

## 2025-01-17 PROCEDURE — 3075F SYST BP GE 130 - 139MM HG: CPT | Performed by: NURSE PRACTITIONER

## 2025-01-17 PROCEDURE — 93000 ELECTROCARDIOGRAM COMPLETE: CPT | Performed by: NURSE PRACTITIONER

## 2025-01-17 PROCEDURE — 3079F DIAST BP 80-89 MM HG: CPT | Performed by: NURSE PRACTITIONER

## 2025-01-17 PROCEDURE — 99214 OFFICE O/P EST MOD 30 MIN: CPT | Performed by: NURSE PRACTITIONER

## 2025-01-17 RX ORDER — AMPICILLIN TRIHYDRATE 250 MG
1 CAPSULE ORAL DAILY
COMMUNITY

## 2025-01-17 NOTE — PROGRESS NOTES
Electrophysiology Follow up  Note      Reason for consultation: Atrial fibrillation    Chief complaint:  recent atrial fibrillation episode    Referring physician:        Primary care physician: Kam Irizarry MD      History of Present Illness:     This visit 1/17/2025  Patient here today for recent atrial fibrillation episode.  Patient recently hospitalized in January with atrial fibrillation.  Patient was stopped of Multaq and started on amiodarone.  Patient did convert to sinus rhythm on amiodarone  Patient previously had PAF ablation in February 2023.  1 month postprocedure patient had A-fib and we had to cardiovert.  Patient did well rest of the blanking period.  Patient was on amiodarone at that time post blanking period and it was stopped as patient had no more atrial fibrillation.  Patient then had episode of A-fib and the setting of NSTEMI in October 2024.  Patient was restarted on Multaq..  Patient denies chest pain, palpitations, shortness of breath, lightheadedness, dizziness, edema or syncope    Previous visit 5/21/2024  Patient is here today for follow-up on ablation of atrial fibrillation.  She reports that she is feeling well.  She denies chest pain, palpitations, shortness of breath, lightheadedness, dizziness, edema or syncope.  Patient reports that she has had problems tolerating thyroid medication.    Previous visit 6/6/2023  Patient is here today for 3-month follow-up status post ablation of atrial fibrillation.  Patient reports that he has been feeling well.  She denies chest pain, palpitations, shortness of breath, lightheadedness, dizziness, edema or syncope.  She reports that she has lost 30 pounds since the ablation with diet and exercise.  She is taking her medications as prescribed.     Previous visit 2/10/2023  Patient here today for follow-up on ablation of atrial fibrillation.  Patient reports that she has had 2 episodes of

## 2025-01-17 NOTE — PATIENT INSTRUCTIONS
Please be informed that if you contact our office outside of normal business hours the physician on call cannot help with any scheduling or rescheduling issues, procedure instruction questions or any type of medication issue.    We advise you for any urgent/emergency that you go to the nearest emergency room!    PLEASE CALL OUR OFFICE DURING NORMAL BUSINESS HOURS    Monday - Friday   8 am to 5 pm    [place]: [phone]    [place]: [phone]    [place]:  [phone]    **It is YOUR responsibilty to bring medication bottles and/or updated medication list to EACH APPOINTMENT. This will allow us to better serve you and all your healthcare needs**    Thank you for allowing us to care for you today!   We want to ensure we can follow your treatment plan and we strive to give you the best outcomes and experience possible.   If you ever have a life threatening emergency and call 911 - for an ambulance (EMS)   Our providers can only care for you at:   [facility] or [facility].   Even if you have someone take you or you drive yourself we can only care for you in a [facility] facility. Our providers are not setup at the other healthcare locations!

## 2025-01-23 ENCOUNTER — TELEPHONE (OUTPATIENT)
Dept: CARDIOLOGY CLINIC | Age: 60
End: 2025-01-23

## 2025-01-23 RX ORDER — POTASSIUM CHLORIDE 750 MG/1
10 TABLET, EXTENDED RELEASE ORAL 2 TIMES DAILY
Qty: 180 TABLET | Refills: 3 | Status: SHIPPED | OUTPATIENT
Start: 2025-01-23

## 2025-01-23 NOTE — TELEPHONE ENCOUNTER
Pt called in stating Junie referred her to Dr. Norman. She has called two times and left messages for his office and no one is returning her calls. She is not sure how to reach them and wants to know if we can reach them or should she be referred somewhere else.

## 2025-01-23 NOTE — TELEPHONE ENCOUNTER
Called patient and advised that I called DR Norman's office. No ans and lm for his office to call me. Called patient and advised. Patient wants to see possibility another endocrinologist. Advised her I will ask Junie who else she would recommend or patient to call insurance to see if a referral is needed and who would be covered under plan. Patient voiced understanding.

## 2025-01-28 ENCOUNTER — TELEPHONE (OUTPATIENT)
Dept: CARDIOLOGY CLINIC | Age: 60
End: 2025-01-28

## 2025-01-28 NOTE — TELEPHONE ENCOUNTER
Called patient and advised per Junie patient she will need to check with insurance and that we can then put in referral. Patient stated she called her PCP and he referred patient to a [place] endocrinologist. No other c/o noted.

## 2025-02-01 RX ORDER — AMLODIPINE BESYLATE 5 MG/1
5 TABLET ORAL DAILY
Qty: 30 TABLET | Refills: 3 | Status: SHIPPED | OUTPATIENT
Start: 2025-02-01

## 2025-02-07 DIAGNOSIS — I48.91 ATRIAL FIBRILLATION, UNSPECIFIED TYPE (HCC): ICD-10-CM

## 2025-02-07 DIAGNOSIS — E78.5 HYPERLIPIDEMIA, UNSPECIFIED HYPERLIPIDEMIA TYPE: ICD-10-CM

## 2025-04-25 ENCOUNTER — TELEPHONE (OUTPATIENT)
Dept: CARDIOLOGY CLINIC | Age: 60
End: 2025-04-25

## 2025-04-25 NOTE — TELEPHONE ENCOUNTER
Pt seen primary doctor and was prescribed Cyclobencaprine 5mg as needed for a pulled muscle or pinched nerve in her neck. She wanted to verify it would be ok to take with other medications she is currently taking.

## 2025-04-28 ENCOUNTER — HOSPITAL ENCOUNTER (OUTPATIENT)
Dept: WOMENS IMAGING | Age: 60
Discharge: HOME | End: 2025-04-28
Payer: COMMERCIAL

## 2025-04-28 DIAGNOSIS — Z12.31 ENCOUNTER FOR SCREENING MAMMOGRAM FOR MALIGNANT NEOPLASM OF BREAST: ICD-10-CM

## 2025-04-28 PROCEDURE — 77063 BREAST TOMOSYNTHESIS BI: CPT

## 2025-05-21 RX ORDER — AMLODIPINE BESYLATE 5 MG/1
5 TABLET ORAL DAILY
Qty: 30 TABLET | Refills: 5 | Status: SHIPPED | OUTPATIENT
Start: 2025-05-21

## 2025-06-09 RX ORDER — FUROSEMIDE 40 MG/1
40 TABLET ORAL 2 TIMES DAILY
Qty: 180 TABLET | Refills: 1 | Status: SHIPPED | OUTPATIENT
Start: 2025-06-09

## 2025-06-17 ENCOUNTER — OFFICE VISIT (OUTPATIENT)
Age: 60
End: 2025-06-17
Payer: COMMERCIAL

## 2025-06-17 VITALS
WEIGHT: 293 LBS | DIASTOLIC BLOOD PRESSURE: 82 MMHG | HEART RATE: 64 BPM | HEIGHT: 63 IN | SYSTOLIC BLOOD PRESSURE: 136 MMHG | BODY MASS INDEX: 51.91 KG/M2

## 2025-06-17 DIAGNOSIS — I10 ESSENTIAL HYPERTENSION: ICD-10-CM

## 2025-06-17 DIAGNOSIS — I48.0 HYPERCOAGULABLE STATE DUE TO PAROXYSMAL ATRIAL FIBRILLATION (HCC): ICD-10-CM

## 2025-06-17 DIAGNOSIS — D68.69 HYPERCOAGULABLE STATE DUE TO PAROXYSMAL ATRIAL FIBRILLATION (HCC): ICD-10-CM

## 2025-06-17 DIAGNOSIS — I44.7 LEFT BUNDLE BRANCH BLOCK: Primary | ICD-10-CM

## 2025-06-17 DIAGNOSIS — I48.0 PAF (PAROXYSMAL ATRIAL FIBRILLATION) (HCC): ICD-10-CM

## 2025-06-17 PROCEDURE — 3075F SYST BP GE 130 - 139MM HG: CPT | Performed by: NURSE PRACTITIONER

## 2025-06-17 PROCEDURE — 99214 OFFICE O/P EST MOD 30 MIN: CPT | Performed by: NURSE PRACTITIONER

## 2025-06-17 PROCEDURE — 3079F DIAST BP 80-89 MM HG: CPT | Performed by: NURSE PRACTITIONER

## 2025-06-17 PROCEDURE — 93000 ELECTROCARDIOGRAM COMPLETE: CPT | Performed by: NURSE PRACTITIONER

## 2025-06-17 NOTE — PATIENT INSTRUCTIONS
Thank you for allowing us to care for you today!   We want to ensure we can follow your treatment plan and we strive to give you the best outcomes and experience possible.   If you ever have a life threatening emergency and call 911 - for an ambulance (EMS)  REMEMBER  Our providers can only care for you at:   [facility] or [facility]   Even if you have someone take you or you drive yourself we can only care for you in a [facility] facility. Our providers are not setup at the other healthcare locations!    PLEASE CALL OUR OFFICE DURING NORMAL BUSINESS HOURS  Monday through Friday 8 am to 5 pm  AFTER HOURS the physician on-call cannot help with scheduling, rescheduling, procedure instruction questions or any type of medication need or issue.  [facility] P:[phone] - [facility] P:[phone] - [facility] P:[phone]      If you receive a survey:  We would appreciate you taking the time to share your experience concerning your provider visit in the office.    These surveys are confidential!  We are eager to improve and are counting on you to share your feedback so we can ensure you get the best care possible.

## 2025-06-17 NOTE — PROGRESS NOTES
edema or syncope.  She reports that she has lost 30 pounds since the ablation with diet and exercise.  She is taking her medications as prescribed.     Previous visit 2/10/2023  Patient here today for follow-up on ablation of atrial fibrillation.  Patient reports that she has had 2 episodes of dizziness.  She states that it came on suddenly after taking her morning medications.  She states both episodes lasted about 3 hours and then resolved on their own.  Patient recently had ablation for atrial fibrillation.  Patient unfortunately went back into atrial fibrillation post ablation.  Patient was started on amiodarone.  Patient denies chest pain, shortness of breath, edema or syncope    Previous visit (12/29/2022)      Chief Complaint   Patient presents with    Consultation     SOBOE- thinks its due to being overweight  Palpitations- Pt is having palpitations, they are constant but not painful like they were last time she had them  No nicotine, no alcohol  Caffeine- rarely- hardly ever  Exercise- no    Shortness of Breath    Palpitations           Previous visit: (10/27/2017      Chief Complaint   Patient presents with    Atrial Fibrillation     Pt states palpitations happen rarely. Pt denies chest pain, shortness of breath, dizziness, and edema.            Previous visit:    Chief Complaint   Patient presents with    Atrial Fibrillation     MO-discuss afib ablation. Attended class on 7/14. Patient denies CP, dizziness, edema. She does report palpitations constantly as well as SOB at times due to the palpitations. Patient is still wearing event monitor, it is due to come off tomorrow.  Patient was in the hospital in Atrial fibrillation in July. Denies syncope  On medications last few days she has been much better.        Patient has symptoms of sleep apnea - snoring - tired when awake and sleeps in the day.     Past medical history:   Past Medical History:   Diagnosis Date    Atrial fibrillation with RVR (HCC)

## 2025-07-02 ENCOUNTER — TELEPHONE (OUTPATIENT)
Dept: CARDIOLOGY CLINIC | Age: 60
End: 2025-07-02

## 2025-07-02 NOTE — TELEPHONE ENCOUNTER
Pt is calling about her stress done yesterday she saw on mychart results and it said it was abnormal and she is wonder what that meant

## 2025-07-03 ENCOUNTER — TELEPHONE (OUTPATIENT)
Dept: CARDIOLOGY CLINIC | Age: 60
End: 2025-07-03

## 2025-07-03 NOTE — TELEPHONE ENCOUNTER
Called patient and advised per Junie that stress test stated unremarkable Cardiolite stress test follow up as scheduled. Patient has f/u with Nancy on 7/17/2025.

## 2025-07-17 ENCOUNTER — OFFICE VISIT (OUTPATIENT)
Dept: CARDIOLOGY CLINIC | Age: 60
End: 2025-07-17
Payer: COMMERCIAL

## 2025-07-17 VITALS
HEIGHT: 63 IN | WEIGHT: 292 LBS | HEART RATE: 64 BPM | BODY MASS INDEX: 51.74 KG/M2 | SYSTOLIC BLOOD PRESSURE: 126 MMHG | DIASTOLIC BLOOD PRESSURE: 72 MMHG

## 2025-07-17 DIAGNOSIS — R00.2 PALPITATION: ICD-10-CM

## 2025-07-17 DIAGNOSIS — I44.7 LEFT BUNDLE BRANCH BLOCK: ICD-10-CM

## 2025-07-17 PROCEDURE — 93000 ELECTROCARDIOGRAM COMPLETE: CPT | Performed by: NURSE PRACTITIONER

## 2025-07-17 PROCEDURE — 3078F DIAST BP <80 MM HG: CPT | Performed by: NURSE PRACTITIONER

## 2025-07-17 PROCEDURE — 99212 OFFICE O/P EST SF 10 MIN: CPT | Performed by: NURSE PRACTITIONER

## 2025-07-17 PROCEDURE — 3074F SYST BP LT 130 MM HG: CPT | Performed by: NURSE PRACTITIONER

## 2025-07-17 ASSESSMENT — ENCOUNTER SYMPTOMS
SHORTNESS OF BREATH: 0
ORTHOPNEA: 0

## 2025-07-17 NOTE — PROGRESS NOTES
7/17/2025  Primary cardiologist: Dr. Lagos    CC:   Janay  is an established 59 y.o.  female here for a follow up on testing       SUBJECTIVE/OBJECTIVE:  Janay is a 59 y.o. female with a history of persistent atrial fibrillation, s/p ablation of AF, VHD hypertension, hyperlipidemia, obesity, metabolic syndrome, sleep apnea and chronic anticoagulation      HPI:  Janay is being here today to follow up on her recent testing. She has occasional palpitation.     Review of Systems   Constitutional: Negative for diaphoresis and malaise/fatigue.   Cardiovascular:  Positive for palpitations. Negative for chest pain, claudication, dyspnea on exertion, irregular heartbeat, leg swelling, near-syncope, orthopnea and paroxysmal nocturnal dyspnea.   Respiratory:  Negative for shortness of breath.    Neurological:  Negative for dizziness and light-headedness.       Vitals:    07/17/25 1131   BP: 126/72   BP Site: Left Upper Arm   Patient Position: Sitting   BP Cuff Size: Medium Adult   Pulse: 64   Weight: 132.5 kg (292 lb)   Height: 1.6 m (5' 3\")     Wt Readings from Last 3 Encounters:   07/17/25 132.5 kg (292 lb)   06/17/25 133.2 kg (293 lb 9.6 oz)   01/17/25 132.5 kg (292 lb 3.2 oz)      Body mass index is 51.73 kg/m².     Physical Exam  Constitutional:       Appearance: She is obese.   Cardiovascular:      Rate and Rhythm: Normal rate.   Pulmonary:      Effort: Pulmonary effort is normal.   Skin:     General: Skin is warm and dry.   Neurological:      Mental Status: She is alert and oriented to person, place, and time.                Current Outpatient Medications   Medication Sig Dispense Refill    furosemide (LASIX) 40 MG tablet TAKE 1 TABLET BY MOUTH TWICE DAILY 180 tablet 1    amLODIPine (NORVASC) 5 MG tablet Take 1 tablet by mouth daily 30 tablet 5    rivaroxaban (XARELTO) 20 MG TABS tablet Take 1 tablet by mouth daily 90 tablet 3    potassium chloride (KLOR-CON M) 10 MEQ extended release tablet Take 1 tablet by mouth

## 2025-07-17 NOTE — PROGRESS NOTES
CLINICAL STAFF DOCUMENTATION         Janaygeorgette Benaivdes  1965  [id number]    Have you had any Chest Pain recently? - No      Have you had any Shortness of Breath - No      Have you had any dizziness - No      Have you had any palpitations recently? - Yes  If Yes DO EKG - Do you feel your heart skipping  How long do they last - .  seconds   Is there anything that aggravates or triggers them? Pt denies   Is there anything that relieves them? Pt denies   Any thyroid issues? - Yes    Do you have any edema - swelling in No        When did you have your last labs drawn 06/04/2025  What doctor ordered ABSTRACTED INTO CHART   Do we have the labs in their chart Yes      Do you have a surgery or procedure scheduled in the near future - No      Caffeine? - No

## 2025-07-28 NOTE — TELEPHONE ENCOUNTER
Pt called requesting refill on Multaq. States pharmacy called her and told her we have been denying this request of refill. Needs new script to [facility]. If pt is to not take Multaq, please call pt back at [phone].

## 2025-08-26 ENCOUNTER — TELEPHONE (OUTPATIENT)
Dept: CARDIOLOGY CLINIC | Age: 60
End: 2025-08-26

## 2025-09-01 ENCOUNTER — HOSPITAL ENCOUNTER (INPATIENT)
Age: 60
LOS: 2 days | Discharge: HOME | DRG: 309 | End: 2025-09-03
Attending: EMERGENCY MEDICINE | Admitting: INTERNAL MEDICINE
Payer: COMMERCIAL

## 2025-09-01 ENCOUNTER — APPOINTMENT (OUTPATIENT)
Dept: GENERAL RADIOLOGY | Age: 60
DRG: 309 | End: 2025-09-01
Payer: COMMERCIAL

## 2025-09-01 DIAGNOSIS — R00.2 PALPITATIONS: ICD-10-CM

## 2025-09-01 DIAGNOSIS — I48.92 ATRIAL FLUTTER WITH RAPID VENTRICULAR RESPONSE (HCC): Primary | ICD-10-CM

## 2025-09-01 DIAGNOSIS — I48.0 PAROXYSMAL ATRIAL FIBRILLATION (HCC): ICD-10-CM

## 2025-09-01 LAB
ALBUMIN SERPL-MCNC: 4 G/DL (ref 3.4–5)
ALBUMIN/GLOB SERPL: 1.5 {RATIO} (ref 1.1–2.2)
ALP SERPL-CCNC: 38 U/L (ref 40–129)
ALT SERPL-CCNC: 18 U/L (ref 10–40)
ANION GAP SERPL CALCULATED.3IONS-SCNC: 13 MMOL/L (ref 9–17)
AST SERPL-CCNC: 23 U/L (ref 15–37)
BILIRUB SERPL-MCNC: 0.4 MG/DL (ref 0–1)
BUN SERPL-MCNC: 19 MG/DL (ref 7–20)
CALCIUM SERPL-MCNC: 9.2 MG/DL (ref 8.3–10.6)
CHLORIDE SERPL-SCNC: 103 MMOL/L (ref 99–110)
CO2 SERPL-SCNC: 26 MMOL/L (ref 21–32)
CREAT SERPL-MCNC: 1 MG/DL (ref 0.6–1.2)
ERYTHROCYTE [DISTWIDTH] IN BLOOD BY AUTOMATED COUNT: 13.1 % (ref 11.7–14.9)
GFR, ESTIMATED: 60 ML/MIN/1.73M2
GLUCOSE SERPL-MCNC: 115 MG/DL (ref 74–99)
HCT VFR BLD AUTO: 46.1 % (ref 37–47)
HGB BLD-MCNC: 15.5 G/DL (ref 12.5–16)
MAGNESIUM SERPL-MCNC: 2.5 MG/DL (ref 1.8–2.4)
MCH RBC QN AUTO: 29.1 PG (ref 27–31)
MCHC RBC AUTO-ENTMCNC: 33.6 G/DL (ref 32–36)
MCV RBC AUTO: 86.7 FL (ref 78–100)
PLATELET # BLD AUTO: 339 K/UL (ref 140–440)
PMV BLD AUTO: 9.7 FL (ref 7.5–11.1)
POTASSIUM SERPL-SCNC: 3.7 MMOL/L (ref 3.5–5.1)
PROT SERPL-MCNC: 6.7 G/DL (ref 6.4–8.2)
RBC # BLD AUTO: 5.32 M/UL (ref 4.2–5.4)
SODIUM SERPL-SCNC: 141 MMOL/L (ref 136–145)
T4 FREE SERPL-MCNC: 1.3 NG/DL (ref 0.9–1.8)
TROPONIN I SERPL HS-MCNC: 10 NG/L (ref 0–14)
TROPONIN I SERPL HS-MCNC: 15 NG/L (ref 0–14)
TSH SERPL DL<=0.05 MIU/L-ACNC: 4.28 UIU/ML (ref 0.27–4.2)
WBC OTHER # BLD: 6.9 K/UL (ref 4–10.5)

## 2025-09-01 PROCEDURE — 2500000003 HC RX 250 WO HCPCS: Performed by: INTERNAL MEDICINE

## 2025-09-01 PROCEDURE — 6370000000 HC RX 637 (ALT 250 FOR IP): Performed by: INTERNAL MEDICINE

## 2025-09-01 PROCEDURE — 85027 COMPLETE CBC AUTOMATED: CPT

## 2025-09-01 PROCEDURE — 84443 ASSAY THYROID STIM HORMONE: CPT

## 2025-09-01 PROCEDURE — 99285 EMERGENCY DEPT VISIT HI MDM: CPT

## 2025-09-01 PROCEDURE — 2140000000 HC CCU INTERMEDIATE R&B

## 2025-09-01 PROCEDURE — 96365 THER/PROPH/DIAG IV INF INIT: CPT

## 2025-09-01 PROCEDURE — 2580000003 HC RX 258: Performed by: EMERGENCY MEDICINE

## 2025-09-01 PROCEDURE — 96366 THER/PROPH/DIAG IV INF ADDON: CPT

## 2025-09-01 PROCEDURE — 6360000002 HC RX W HCPCS: Performed by: EMERGENCY MEDICINE

## 2025-09-01 PROCEDURE — 84484 ASSAY OF TROPONIN QUANT: CPT

## 2025-09-01 PROCEDURE — 96376 TX/PRO/DX INJ SAME DRUG ADON: CPT

## 2025-09-01 PROCEDURE — 84439 ASSAY OF FREE THYROXINE: CPT

## 2025-09-01 PROCEDURE — 71045 X-RAY EXAM CHEST 1 VIEW: CPT

## 2025-09-01 PROCEDURE — 83735 ASSAY OF MAGNESIUM: CPT

## 2025-09-01 PROCEDURE — 6360000002 HC RX W HCPCS: Performed by: INTERNAL MEDICINE

## 2025-09-01 PROCEDURE — 80053 COMPREHEN METABOLIC PANEL: CPT

## 2025-09-01 PROCEDURE — 2580000003 HC RX 258: Performed by: INTERNAL MEDICINE

## 2025-09-01 PROCEDURE — 93005 ELECTROCARDIOGRAM TRACING: CPT | Performed by: EMERGENCY MEDICINE

## 2025-09-01 RX ORDER — POLYETHYLENE GLYCOL 3350 17 G/17G
17 POWDER, FOR SOLUTION ORAL DAILY PRN
Status: DISCONTINUED | OUTPATIENT
Start: 2025-09-01 | End: 2025-09-03 | Stop reason: HOSPADM

## 2025-09-01 RX ORDER — AMPICILLIN TRIHYDRATE 250 MG
1 CAPSULE ORAL DAILY
Status: DISCONTINUED | OUTPATIENT
Start: 2025-09-02 | End: 2025-09-01

## 2025-09-01 RX ORDER — ONDANSETRON 2 MG/ML
4 INJECTION INTRAMUSCULAR; INTRAVENOUS EVERY 6 HOURS PRN
Status: DISCONTINUED | OUTPATIENT
Start: 2025-09-01 | End: 2025-09-02

## 2025-09-01 RX ORDER — ACETAMINOPHEN 650 MG/1
650 SUPPOSITORY RECTAL EVERY 6 HOURS PRN
Status: DISCONTINUED | OUTPATIENT
Start: 2025-09-01 | End: 2025-09-03 | Stop reason: HOSPADM

## 2025-09-01 RX ORDER — FUROSEMIDE 40 MG/1
40 TABLET ORAL 2 TIMES DAILY
Status: DISCONTINUED | OUTPATIENT
Start: 2025-09-01 | End: 2025-09-03 | Stop reason: HOSPADM

## 2025-09-01 RX ORDER — SODIUM CHLORIDE 0.9 % (FLUSH) 0.9 %
5-40 SYRINGE (ML) INJECTION EVERY 12 HOURS SCHEDULED
Status: DISCONTINUED | OUTPATIENT
Start: 2025-09-01 | End: 2025-09-03 | Stop reason: HOSPADM

## 2025-09-01 RX ORDER — ACETAMINOPHEN 325 MG/1
650 TABLET ORAL EVERY 6 HOURS PRN
Status: DISCONTINUED | OUTPATIENT
Start: 2025-09-01 | End: 2025-09-03 | Stop reason: HOSPADM

## 2025-09-01 RX ORDER — POTASSIUM CHLORIDE 1500 MG/1
40 TABLET, EXTENDED RELEASE ORAL PRN
Status: DISCONTINUED | OUTPATIENT
Start: 2025-09-01 | End: 2025-09-03 | Stop reason: HOSPADM

## 2025-09-01 RX ORDER — DILTIAZEM HYDROCHLORIDE 5 MG/ML
20 INJECTION INTRAVENOUS ONCE
Status: COMPLETED | OUTPATIENT
Start: 2025-09-01 | End: 2025-09-01

## 2025-09-01 RX ORDER — POTASSIUM CHLORIDE 1500 MG/1
10 TABLET, EXTENDED RELEASE ORAL 2 TIMES DAILY
Status: DISCONTINUED | OUTPATIENT
Start: 2025-09-01 | End: 2025-09-03 | Stop reason: HOSPADM

## 2025-09-01 RX ORDER — ONDANSETRON 4 MG/1
4 TABLET, ORALLY DISINTEGRATING ORAL EVERY 8 HOURS PRN
Status: DISCONTINUED | OUTPATIENT
Start: 2025-09-01 | End: 2025-09-02

## 2025-09-01 RX ORDER — DIGOXIN 0.25 MG/ML
250 INJECTION INTRAMUSCULAR; INTRAVENOUS ONCE
Status: COMPLETED | OUTPATIENT
Start: 2025-09-01 | End: 2025-09-01

## 2025-09-01 RX ORDER — SODIUM CHLORIDE 9 MG/ML
INJECTION, SOLUTION INTRAVENOUS PRN
Status: DISCONTINUED | OUTPATIENT
Start: 2025-09-01 | End: 2025-09-03 | Stop reason: HOSPADM

## 2025-09-01 RX ORDER — POTASSIUM CHLORIDE 7.45 MG/ML
10 INJECTION INTRAVENOUS PRN
Status: DISCONTINUED | OUTPATIENT
Start: 2025-09-01 | End: 2025-09-03 | Stop reason: HOSPADM

## 2025-09-01 RX ORDER — MAGNESIUM SULFATE IN WATER 40 MG/ML
2000 INJECTION, SOLUTION INTRAVENOUS PRN
Status: DISCONTINUED | OUTPATIENT
Start: 2025-09-01 | End: 2025-09-03 | Stop reason: HOSPADM

## 2025-09-01 RX ORDER — FENOFIBRATE 160 MG/1
160 TABLET ORAL DAILY
Status: DISCONTINUED | OUTPATIENT
Start: 2025-09-02 | End: 2025-09-03 | Stop reason: HOSPADM

## 2025-09-01 RX ORDER — SODIUM CHLORIDE 0.9 % (FLUSH) 0.9 %
5-40 SYRINGE (ML) INJECTION PRN
Status: DISCONTINUED | OUTPATIENT
Start: 2025-09-01 | End: 2025-09-03 | Stop reason: HOSPADM

## 2025-09-01 RX ADMIN — AMIODARONE HYDROCHLORIDE 1 MG/MIN: 50 INJECTION, SOLUTION INTRAVENOUS at 20:31

## 2025-09-01 RX ADMIN — DEXTROSE 150 MG: 50 INJECTION, SOLUTION INTRAVENOUS at 20:19

## 2025-09-01 RX ADMIN — SODIUM CHLORIDE, PRESERVATIVE FREE 10 ML: 5 INJECTION INTRAVENOUS at 20:32

## 2025-09-01 RX ADMIN — SODIUM CHLORIDE 5 MG/HR: 900 INJECTION, SOLUTION INTRAVENOUS at 15:59

## 2025-09-01 RX ADMIN — DILTIAZEM HYDROCHLORIDE 20 MG: 5 INJECTION, SOLUTION INTRAVENOUS at 15:57

## 2025-09-01 RX ADMIN — DIGOXIN 250 MCG: 0.25 INJECTION INTRAMUSCULAR; INTRAVENOUS at 20:15

## 2025-09-01 RX ADMIN — FUROSEMIDE 40 MG: 40 TABLET ORAL at 20:31

## 2025-09-01 RX ADMIN — POTASSIUM CHLORIDE 10 MEQ: 1500 TABLET, EXTENDED RELEASE ORAL at 20:31

## 2025-09-01 ASSESSMENT — PAIN - FUNCTIONAL ASSESSMENT: PAIN_FUNCTIONAL_ASSESSMENT: 0-10

## 2025-09-01 ASSESSMENT — PAIN SCALES - GENERAL: PAINLEVEL_OUTOF10: 0

## 2025-09-02 ENCOUNTER — APPOINTMENT (OUTPATIENT)
Dept: NON INVASIVE DIAGNOSTICS | Age: 60
DRG: 309 | End: 2025-09-02
Attending: INTERNAL MEDICINE
Payer: COMMERCIAL

## 2025-09-02 LAB
ANION GAP SERPL CALCULATED.3IONS-SCNC: 11 MMOL/L (ref 9–17)
BASOPHILS # BLD: 0.05 K/UL
BASOPHILS NFR BLD: 1 % (ref 0–1)
BUN SERPL-MCNC: 18 MG/DL (ref 7–20)
CALCIUM SERPL-MCNC: 9 MG/DL (ref 8.3–10.6)
CHLORIDE SERPL-SCNC: 102 MMOL/L (ref 99–110)
CO2 SERPL-SCNC: 27 MMOL/L (ref 21–32)
CREAT SERPL-MCNC: 1 MG/DL (ref 0.6–1.2)
ECHO AO ROOT DIAM: 3 CM
ECHO AO ROOT INDEX: 1.36 CM/M2
ECHO AV AREA PEAK VELOCITY: 3.6 CM2
ECHO AV AREA VTI: 3.6 CM2
ECHO AV AREA/BSA PEAK VELOCITY: 1.6 CM2/M2
ECHO AV AREA/BSA VTI: 1.6 CM2/M2
ECHO AV MEAN GRADIENT: 2 MMHG
ECHO AV MEAN VELOCITY: 0.6 M/S
ECHO AV PEAK GRADIENT: 3 MMHG
ECHO AV PEAK VELOCITY: 0.9 M/S
ECHO AV VELOCITY RATIO: 0.89
ECHO AV VTI: 19.7 CM
ECHO BSA: 2.35 M2
ECHO IVC PROX: 1.8 CM
ECHO LA AREA 4C: 18.2 CM2
ECHO LA DIAMETER INDEX: 1.54 CM/M2
ECHO LA DIAMETER: 3.4 CM
ECHO LA MAJOR AXIS: 5.6 CM
ECHO LA TO AORTIC ROOT RATIO: 1.13
ECHO LA VOL MOD A4C: 48 ML (ref 22–52)
ECHO LA VOLUME INDEX MOD A4C: 22 ML/M2 (ref 16–34)
ECHO LV E' LATERAL VELOCITY: 5.8 CM/S
ECHO LV E' SEPTAL VELOCITY: 5.1 CM/S
ECHO LV EDV A4C: 88 ML
ECHO LV EDV INDEX A4C: 40 ML/M2
ECHO LV EF PHYSICIAN: 50 %
ECHO LV EJECTION FRACTION A4C: 52 %
ECHO LV ESV A4C: 42 ML
ECHO LV ESV INDEX A4C: 19 ML/M2
ECHO LV FRACTIONAL SHORTENING: 32 % (ref 28–44)
ECHO LV INTERNAL DIMENSION DIASTOLE INDEX: 1.67 CM/M2
ECHO LV INTERNAL DIMENSION DIASTOLIC: 3.7 CM (ref 3.9–5.3)
ECHO LV INTERNAL DIMENSION SYSTOLIC INDEX: 1.13 CM/M2
ECHO LV INTERNAL DIMENSION SYSTOLIC: 2.5 CM
ECHO LV IVSD: 1.9 CM (ref 0.6–0.9)
ECHO LV MASS 2D: 309.5 G (ref 67–162)
ECHO LV MASS INDEX 2D: 140 G/M2 (ref 43–95)
ECHO LV POSTERIOR WALL DIASTOLIC: 1.9 CM (ref 0.6–0.9)
ECHO LV RELATIVE WALL THICKNESS RATIO: 1.03
ECHO LVOT AREA: 4.2 CM2
ECHO LVOT AV VTI INDEX: 0.88
ECHO LVOT DIAM: 2.3 CM
ECHO LVOT MEAN GRADIENT: 1 MMHG
ECHO LVOT PEAK GRADIENT: 3 MMHG
ECHO LVOT PEAK VELOCITY: 0.8 M/S
ECHO LVOT STROKE VOLUME INDEX: 32.5 ML/M2
ECHO LVOT SV: 71.8 ML
ECHO LVOT VTI: 17.3 CM
ECHO MV A VELOCITY: 0.56 M/S
ECHO MV E DECELERATION TIME (DT): 229 MS
ECHO MV E VELOCITY: 0.65 M/S
ECHO MV E/A RATIO: 1.16
ECHO MV E/E' LATERAL: 11.21
ECHO MV E/E' RATIO (AVERAGED): 11.98
ECHO MV E/E' SEPTAL: 12.75
ECHO RV MID DIMENSION: 3.9 CM
EOSINOPHIL # BLD: 0.1 K/UL
EOSINOPHILS RELATIVE PERCENT: 2 % (ref 0–3)
ERYTHROCYTE [DISTWIDTH] IN BLOOD BY AUTOMATED COUNT: 13.2 % (ref 11.7–14.9)
GFR, ESTIMATED: 58 ML/MIN/1.73M2
GLUCOSE SERPL-MCNC: 107 MG/DL (ref 74–99)
HCT VFR BLD AUTO: 45 % (ref 37–47)
HGB BLD-MCNC: 14.7 G/DL (ref 12.5–16)
IMM GRANULOCYTES # BLD AUTO: 0.03 K/UL
IMM GRANULOCYTES NFR BLD: 1 %
LYMPHOCYTES NFR BLD: 2.67 K/UL
LYMPHOCYTES RELATIVE PERCENT: 44 % (ref 24–44)
MCH RBC QN AUTO: 29.1 PG (ref 27–31)
MCHC RBC AUTO-ENTMCNC: 32.7 G/DL (ref 32–36)
MCV RBC AUTO: 88.9 FL (ref 78–100)
MONOCYTES NFR BLD: 0.59 K/UL
MONOCYTES NFR BLD: 10 % (ref 0–5)
NEUTROPHILS NFR BLD: 44 % (ref 36–66)
NEUTS SEG NFR BLD: 2.69 K/UL
PLATELET # BLD AUTO: 267 K/UL (ref 140–440)
PMV BLD AUTO: 9.8 FL (ref 7.5–11.1)
POTASSIUM SERPL-SCNC: 3.6 MMOL/L (ref 3.5–5.1)
RBC # BLD AUTO: 5.06 M/UL (ref 4.2–5.4)
SODIUM SERPL-SCNC: 140 MMOL/L (ref 136–145)
WBC OTHER # BLD: 6.1 K/UL (ref 4–10.5)

## 2025-09-02 PROCEDURE — 99223 1ST HOSP IP/OBS HIGH 75: CPT | Performed by: INTERNAL MEDICINE

## 2025-09-02 PROCEDURE — 6370000000 HC RX 637 (ALT 250 FOR IP): Performed by: INTERNAL MEDICINE

## 2025-09-02 PROCEDURE — 80048 BASIC METABOLIC PNL TOTAL CA: CPT

## 2025-09-02 PROCEDURE — 93306 TTE W/DOPPLER COMPLETE: CPT

## 2025-09-02 PROCEDURE — 6370000000 HC RX 637 (ALT 250 FOR IP): Performed by: STUDENT IN AN ORGANIZED HEALTH CARE EDUCATION/TRAINING PROGRAM

## 2025-09-02 PROCEDURE — 2140000000 HC CCU INTERMEDIATE R&B

## 2025-09-02 PROCEDURE — 94761 N-INVAS EAR/PLS OXIMETRY MLT: CPT

## 2025-09-02 PROCEDURE — 36415 COLL VENOUS BLD VENIPUNCTURE: CPT

## 2025-09-02 PROCEDURE — 2500000003 HC RX 250 WO HCPCS: Performed by: INTERNAL MEDICINE

## 2025-09-02 PROCEDURE — 93306 TTE W/DOPPLER COMPLETE: CPT | Performed by: INTERNAL MEDICINE

## 2025-09-02 PROCEDURE — 93005 ELECTROCARDIOGRAM TRACING: CPT | Performed by: INTERNAL MEDICINE

## 2025-09-02 PROCEDURE — APPNB15 APP NON BILLABLE TIME 0-15 MINS

## 2025-09-02 PROCEDURE — 5A09357 ASSISTANCE WITH RESPIRATORY VENTILATION, LESS THAN 24 CONSECUTIVE HOURS, CONTINUOUS POSITIVE AIRWAY PRESSURE: ICD-10-PCS | Performed by: STUDENT IN AN ORGANIZED HEALTH CARE EDUCATION/TRAINING PROGRAM

## 2025-09-02 PROCEDURE — 85025 COMPLETE CBC W/AUTO DIFF WBC: CPT

## 2025-09-02 RX ADMIN — POTASSIUM CHLORIDE 10 MEQ: 1500 TABLET, EXTENDED RELEASE ORAL at 20:15

## 2025-09-02 RX ADMIN — FUROSEMIDE 40 MG: 40 TABLET ORAL at 09:15

## 2025-09-02 RX ADMIN — FENOFIBRATE 160 MG: 160 TABLET ORAL at 09:15

## 2025-09-02 RX ADMIN — POTASSIUM CHLORIDE 10 MEQ: 1500 TABLET, EXTENDED RELEASE ORAL at 09:14

## 2025-09-02 RX ADMIN — RIVAROXABAN 20 MG: 20 TABLET, FILM COATED ORAL at 09:15

## 2025-09-02 RX ADMIN — DRONEDARONE 400 MG: 400 TABLET, FILM COATED ORAL at 18:01

## 2025-09-02 RX ADMIN — FUROSEMIDE 40 MG: 40 TABLET ORAL at 20:15

## 2025-09-02 RX ADMIN — ACETAMINOPHEN 650 MG: 325 TABLET ORAL at 05:20

## 2025-09-02 RX ADMIN — SODIUM CHLORIDE, PRESERVATIVE FREE 10 ML: 5 INJECTION INTRAVENOUS at 20:16

## 2025-09-02 RX ADMIN — SODIUM CHLORIDE, PRESERVATIVE FREE 10 ML: 5 INJECTION INTRAVENOUS at 09:15

## 2025-09-02 ASSESSMENT — PAIN DESCRIPTION - LOCATION: LOCATION: HEAD

## 2025-09-02 ASSESSMENT — ENCOUNTER SYMPTOMS
NAUSEA: 0
BLOOD IN STOOL: 0
ABDOMINAL PAIN: 0
CHEST TIGHTNESS: 0
SHORTNESS OF BREATH: 0

## 2025-09-02 ASSESSMENT — PAIN - FUNCTIONAL ASSESSMENT
PAIN_FUNCTIONAL_ASSESSMENT: 0-10
PAIN_FUNCTIONAL_ASSESSMENT: 0-10

## 2025-09-02 ASSESSMENT — PAIN SCALES - GENERAL
PAINLEVEL_OUTOF10: 4
PAINLEVEL_OUTOF10: 0
PAINLEVEL_OUTOF10: 0

## 2025-09-02 ASSESSMENT — PAIN DESCRIPTION - DESCRIPTORS: DESCRIPTORS: ACHING

## 2025-09-03 VITALS
DIASTOLIC BLOOD PRESSURE: 72 MMHG | OXYGEN SATURATION: 97 % | RESPIRATION RATE: 15 BRPM | HEIGHT: 63 IN | HEART RATE: 56 BPM | WEIGHT: 275 LBS | SYSTOLIC BLOOD PRESSURE: 149 MMHG | BODY MASS INDEX: 48.73 KG/M2 | TEMPERATURE: 97.5 F

## 2025-09-03 PROBLEM — I48.92 ATRIAL FLUTTER WITH RAPID VENTRICULAR RESPONSE (HCC): Status: RESOLVED | Noted: 2025-09-01 | Resolved: 2025-09-03

## 2025-09-03 LAB
ANION GAP SERPL CALCULATED.3IONS-SCNC: 12 MMOL/L (ref 9–17)
BUN SERPL-MCNC: 21 MG/DL (ref 7–20)
CALCIUM SERPL-MCNC: 8.8 MG/DL (ref 8.3–10.6)
CHLORIDE SERPL-SCNC: 100 MMOL/L (ref 99–110)
CO2 SERPL-SCNC: 26 MMOL/L (ref 21–32)
CREAT SERPL-MCNC: 1.1 MG/DL (ref 0.6–1.2)
GFR, ESTIMATED: 53 ML/MIN/1.73M2
GLUCOSE SERPL-MCNC: 117 MG/DL (ref 74–99)
POTASSIUM SERPL-SCNC: 3.6 MMOL/L (ref 3.5–5.1)
SODIUM SERPL-SCNC: 138 MMOL/L (ref 136–145)

## 2025-09-03 PROCEDURE — 2500000003 HC RX 250 WO HCPCS: Performed by: INTERNAL MEDICINE

## 2025-09-03 PROCEDURE — 99232 SBSQ HOSP IP/OBS MODERATE 35: CPT

## 2025-09-03 PROCEDURE — 6370000000 HC RX 637 (ALT 250 FOR IP): Performed by: INTERNAL MEDICINE

## 2025-09-03 PROCEDURE — 80048 BASIC METABOLIC PNL TOTAL CA: CPT

## 2025-09-03 PROCEDURE — 36415 COLL VENOUS BLD VENIPUNCTURE: CPT

## 2025-09-03 PROCEDURE — 6370000000 HC RX 637 (ALT 250 FOR IP): Performed by: STUDENT IN AN ORGANIZED HEALTH CARE EDUCATION/TRAINING PROGRAM

## 2025-09-03 RX ADMIN — SODIUM CHLORIDE, PRESERVATIVE FREE 10 ML: 5 INJECTION INTRAVENOUS at 08:06

## 2025-09-03 RX ADMIN — FENOFIBRATE 160 MG: 160 TABLET ORAL at 08:05

## 2025-09-03 RX ADMIN — RIVAROXABAN 20 MG: 20 TABLET, FILM COATED ORAL at 08:05

## 2025-09-03 RX ADMIN — DRONEDARONE 400 MG: 400 TABLET, FILM COATED ORAL at 08:05

## 2025-09-03 RX ADMIN — POTASSIUM CHLORIDE 10 MEQ: 1500 TABLET, EXTENDED RELEASE ORAL at 08:05

## 2025-09-03 RX ADMIN — ACETAMINOPHEN 650 MG: 325 TABLET ORAL at 02:39

## 2025-09-03 RX ADMIN — FUROSEMIDE 40 MG: 40 TABLET ORAL at 08:05

## 2025-09-03 ASSESSMENT — PAIN - FUNCTIONAL ASSESSMENT
PAIN_FUNCTIONAL_ASSESSMENT: 0-10
PAIN_FUNCTIONAL_ASSESSMENT: 0-10

## 2025-09-03 ASSESSMENT — PAIN SCALES - GENERAL
PAINLEVEL_OUTOF10: 2
PAINLEVEL_OUTOF10: 0
PAINLEVEL_OUTOF10: 7

## 2025-09-04 ENCOUNTER — OFFICE VISIT (OUTPATIENT)
Dept: CARDIOLOGY CLINIC | Age: 60
End: 2025-09-04
Payer: COMMERCIAL

## 2025-09-04 VITALS
WEIGHT: 285.4 LBS | DIASTOLIC BLOOD PRESSURE: 84 MMHG | HEART RATE: 60 BPM | SYSTOLIC BLOOD PRESSURE: 132 MMHG | BODY MASS INDEX: 50.57 KG/M2 | HEIGHT: 63 IN

## 2025-09-04 DIAGNOSIS — I48.3 TYPICAL ATRIAL FLUTTER (HCC): ICD-10-CM

## 2025-09-04 DIAGNOSIS — I48.0 PAF (PAROXYSMAL ATRIAL FIBRILLATION) (HCC): Primary | ICD-10-CM

## 2025-09-04 LAB
EKG ATRIAL RATE: 266 BPM
EKG ATRIAL RATE: 50 BPM
EKG DIAGNOSIS: NORMAL
EKG DIAGNOSIS: NORMAL
EKG P AXIS: 254 DEGREES
EKG P AXIS: 33 DEGREES
EKG P-R INTERVAL: 172 MS
EKG Q-T INTERVAL: 374 MS
EKG Q-T INTERVAL: 508 MS
EKG QRS DURATION: 144 MS
EKG QRS DURATION: 146 MS
EKG QTC CALCULATION (BAZETT): 463 MS
EKG QTC CALCULATION (BAZETT): 556 MS
EKG R AXIS: -16 DEGREES
EKG R AXIS: 39 DEGREES
EKG T AXIS: 183 DEGREES
EKG T AXIS: 189 DEGREES
EKG VENTRICULAR RATE: 133 BPM
EKG VENTRICULAR RATE: 50 BPM

## 2025-09-04 PROCEDURE — 93010 ELECTROCARDIOGRAM REPORT: CPT | Performed by: INTERNAL MEDICINE

## 2025-09-04 PROCEDURE — 99214 OFFICE O/P EST MOD 30 MIN: CPT | Performed by: NURSE PRACTITIONER

## 2025-09-04 PROCEDURE — 3075F SYST BP GE 130 - 139MM HG: CPT | Performed by: NURSE PRACTITIONER

## 2025-09-04 PROCEDURE — 3079F DIAST BP 80-89 MM HG: CPT | Performed by: NURSE PRACTITIONER

## 2025-09-04 ASSESSMENT — ENCOUNTER SYMPTOMS: SHORTNESS OF BREATH: 0

## (undated) DEVICE — Z DISCONTINUED NO SUB IDED TUBING ETCO2 AD L6.5FT NSL ORAL CVD PRNG NONFLARED TIP OVR

## (undated) DEVICE — RETRIEVER ENDOSCP UNIV 4X5.5 CM 2.5 MMX230 CM PLAT ROTH NET

## (undated) DEVICE — SNARE POLYP SM W13MMXL240CM SHTH DIA2.4MM OVL FLX DISP